# Patient Record
Sex: MALE | Race: WHITE | Employment: OTHER | URBAN - METROPOLITAN AREA
[De-identification: names, ages, dates, MRNs, and addresses within clinical notes are randomized per-mention and may not be internally consistent; named-entity substitution may affect disease eponyms.]

---

## 2017-01-09 DIAGNOSIS — J30.2 SEASONAL ALLERGIC RHINITIS, UNSPECIFIED ALLERGIC RHINITIS TRIGGER: Primary | ICD-10-CM

## 2017-01-09 RX ORDER — CETIRIZINE HYDROCHLORIDE 10 MG/1
10 TABLET ORAL DAILY PRN
Qty: 90 TABLET | Refills: 0 | Status: SHIPPED | OUTPATIENT
Start: 2017-01-09 | End: 2017-04-10

## 2017-01-09 NOTE — TELEPHONE ENCOUNTER
cetirizine 10mg      Last Written Prescription Date: 10/06/16  Last Fill Quantity: 90,  # refills: 0   Last Office Visit with [facility], [facility] or [facility] prescribing provider: 07/02/15

## 2017-01-09 NOTE — TELEPHONE ENCOUNTER
Routing refill request to provider for review/approval because:  Jina given x1 and patient did not follow up, please advise  Patient needs to be seen because it has been more than 1 year since last office visit. Pt last saw  7/2/15B. Tigre COHEN

## 2017-02-14 ENCOUNTER — OFFICE VISIT (OUTPATIENT)
Dept: INTERNAL MEDICINE | Facility: CLINIC | Age: 27
End: 2017-02-14
Payer: MEDICAID

## 2017-02-14 ENCOUNTER — MEDICAL CORRESPONDENCE (OUTPATIENT)
Dept: HEALTH INFORMATION MANAGEMENT | Facility: CLINIC | Age: 27
End: 2017-02-14

## 2017-02-14 VITALS
SYSTOLIC BLOOD PRESSURE: 132 MMHG | TEMPERATURE: 97.9 F | OXYGEN SATURATION: 98 % | DIASTOLIC BLOOD PRESSURE: 90 MMHG | HEIGHT: 76 IN | HEART RATE: 90 BPM | BODY MASS INDEX: 29.63 KG/M2 | WEIGHT: 243.3 LBS

## 2017-02-14 DIAGNOSIS — E03.9 HYPOTHYROIDISM, UNSPECIFIED: ICD-10-CM

## 2017-02-14 DIAGNOSIS — E78.5 HYPERLIPIDEMIA LDL GOAL <160: ICD-10-CM

## 2017-02-14 DIAGNOSIS — Z00.00 ENCOUNTER FOR ROUTINE ADULT HEALTH EXAMINATION WITHOUT ABNORMAL FINDINGS: Primary | ICD-10-CM

## 2017-02-14 DIAGNOSIS — Z79.899 ENCOUNTER FOR LONG-TERM (CURRENT) USE OF HIGH-RISK MEDICATION: ICD-10-CM

## 2017-02-14 DIAGNOSIS — Z13.29 SCREENING FOR THYROID DISORDER: ICD-10-CM

## 2017-02-14 DIAGNOSIS — Z13.1 SCREENING FOR DIABETES MELLITUS: ICD-10-CM

## 2017-02-14 DIAGNOSIS — Z23 NEED FOR PROPHYLACTIC VACCINATION AND INOCULATION AGAINST INFLUENZA: ICD-10-CM

## 2017-02-14 LAB
ALBUMIN SERPL-MCNC: 4.2 G/DL (ref 3.4–5)
ALP SERPL-CCNC: 115 U/L (ref 40–150)
ALT SERPL W P-5'-P-CCNC: 65 U/L (ref 0–70)
ANION GAP SERPL CALCULATED.3IONS-SCNC: 6 MMOL/L (ref 3–14)
AST SERPL W P-5'-P-CCNC: 23 U/L (ref 0–45)
BASOPHILS # BLD AUTO: 0 10E9/L (ref 0–0.2)
BASOPHILS NFR BLD AUTO: 0.4 %
BILIRUB SERPL-MCNC: 0.4 MG/DL (ref 0.2–1.3)
BUN SERPL-MCNC: 11 MG/DL (ref 7–30)
CALCIUM SERPL-MCNC: 9.1 MG/DL (ref 8.5–10.1)
CHLORIDE SERPL-SCNC: 102 MMOL/L (ref 94–109)
CHOLEST SERPL-MCNC: 191 MG/DL
CO2 SERPL-SCNC: 30 MMOL/L (ref 20–32)
CREAT SERPL-MCNC: 1.07 MG/DL (ref 0.66–1.25)
DIFFERENTIAL METHOD BLD: NORMAL
EOSINOPHIL # BLD AUTO: 0.2 10E9/L (ref 0–0.7)
EOSINOPHIL NFR BLD AUTO: 2.1 %
ERYTHROCYTE [DISTWIDTH] IN BLOOD BY AUTOMATED COUNT: 12.6 % (ref 10–15)
GFR SERPL CREATININE-BSD FRML MDRD: 82 ML/MIN/1.7M2
GLUCOSE SERPL-MCNC: 98 MG/DL (ref 70–99)
HBA1C MFR BLD: 5.2 % (ref 4.3–6)
HCT VFR BLD AUTO: 43.8 % (ref 40–53)
HDLC SERPL-MCNC: 30 MG/DL
HGB BLD-MCNC: 15.6 G/DL (ref 13.3–17.7)
LDLC SERPL CALC-MCNC: ABNORMAL MG/DL
LDLC SERPL DIRECT ASSAY-MCNC: 69 MG/DL
LYMPHOCYTES # BLD AUTO: 2.9 10E9/L (ref 0.8–5.3)
LYMPHOCYTES NFR BLD AUTO: 38 %
MCH RBC QN AUTO: 29.5 PG (ref 26.5–33)
MCHC RBC AUTO-ENTMCNC: 35.6 G/DL (ref 31.5–36.5)
MCV RBC AUTO: 83 FL (ref 78–100)
MONOCYTES # BLD AUTO: 0.5 10E9/L (ref 0–1.3)
MONOCYTES NFR BLD AUTO: 5.9 %
NEUTROPHILS # BLD AUTO: 4.1 10E9/L (ref 1.6–8.3)
NEUTROPHILS NFR BLD AUTO: 53.6 %
NONHDLC SERPL-MCNC: 161 MG/DL
PLATELET # BLD AUTO: 249 10E9/L (ref 150–450)
POTASSIUM SERPL-SCNC: 4 MMOL/L (ref 3.4–5.3)
PROT SERPL-MCNC: 8.3 G/DL (ref 6.8–8.8)
RBC # BLD AUTO: 5.28 10E12/L (ref 4.4–5.9)
SODIUM SERPL-SCNC: 138 MMOL/L (ref 133–144)
T4 FREE SERPL-MCNC: 0.59 NG/DL (ref 0.76–1.46)
TRIGL SERPL-MCNC: 613 MG/DL
TSH SERPL DL<=0.005 MIU/L-ACNC: 7.47 MU/L (ref 0.4–4)
WBC # BLD AUTO: 7.7 10E9/L (ref 4–11)

## 2017-02-14 PROCEDURE — 93000 ELECTROCARDIOGRAM COMPLETE: CPT | Performed by: INTERNAL MEDICINE

## 2017-02-14 PROCEDURE — 84439 ASSAY OF FREE THYROXINE: CPT | Performed by: INTERNAL MEDICINE

## 2017-02-14 PROCEDURE — 84443 ASSAY THYROID STIM HORMONE: CPT | Performed by: INTERNAL MEDICINE

## 2017-02-14 PROCEDURE — 80061 LIPID PANEL: CPT | Performed by: INTERNAL MEDICINE

## 2017-02-14 PROCEDURE — 36415 COLL VENOUS BLD VENIPUNCTURE: CPT | Performed by: INTERNAL MEDICINE

## 2017-02-14 PROCEDURE — 99395 PREV VISIT EST AGE 18-39: CPT | Mod: 25 | Performed by: INTERNAL MEDICINE

## 2017-02-14 PROCEDURE — 90688 IIV4 VACCINE SPLT 0.5 ML IM: CPT | Performed by: INTERNAL MEDICINE

## 2017-02-14 PROCEDURE — 80053 COMPREHEN METABOLIC PANEL: CPT | Performed by: INTERNAL MEDICINE

## 2017-02-14 PROCEDURE — 83036 HEMOGLOBIN GLYCOSYLATED A1C: CPT | Performed by: INTERNAL MEDICINE

## 2017-02-14 PROCEDURE — 99213 OFFICE O/P EST LOW 20 MIN: CPT | Mod: 25 | Performed by: INTERNAL MEDICINE

## 2017-02-14 PROCEDURE — 90471 IMMUNIZATION ADMIN: CPT | Performed by: INTERNAL MEDICINE

## 2017-02-14 PROCEDURE — 85025 COMPLETE CBC W/AUTO DIFF WBC: CPT | Performed by: INTERNAL MEDICINE

## 2017-02-14 PROCEDURE — 83721 ASSAY OF BLOOD LIPOPROTEIN: CPT | Mod: 59 | Performed by: INTERNAL MEDICINE

## 2017-02-14 NOTE — MR AVS SNAPSHOT
"              After Visit Summary   2/14/2017    Ravi Dunne    MRN: [id number]           Patient Information     Date Of Birth          1990        Visit Information        Provider Department      2/14/2017 9:00 AM Bhaskar Gandara MD [facility]        Today's Diagnoses     Encounter for routine adult health examination without abnormal findings    -  1    Encounter for long-term (current) use of high-risk medication        Hyperlipidemia LDL goal <160        Screening for diabetes mellitus        Need for prophylactic vaccination and inoculation against influenza        Screening for thyroid disorder          Care Instructions      Preventive Health Recommendations  Male Ages 26 - 39    Yearly exam:             See your health care provider every year in order to  o   Review health changes.   o   Discuss preventive care.    o   Review your medicines if your doctor has prescribed any.    You should be tested each year for STDs (sexually transmitted diseases), if you re at risk.     After age 35, talk to your provider about cholesterol testing. If you are at risk for heart disease, have your cholesterol tested at least every 5 years.     If you are at risk for diabetes, you should have a diabetes test (fasting glucose).  Shots: Get a flu shot each year. Get a tetanus shot every 10 years.     Nutrition:    Eat at least 5 servings of fruits and vegetables daily.     Eat whole-grain bread, whole-wheat pasta and brown rice instead of white grains and rice.     Talk to your provider about Calcium and Vitamin D.        --Good Grains:  Oats, brown rice, Quinoa (these do not raise the blood sugar as much)     --Bad grains:  Anything made from wheat or white rice     (because these raise the blood sugars significantly, and the possible gluten issue from wheat for some people).      --Proteins:  Aim for \"lean proteins\" including chicken, fish, seafood, pork, turkey, and eggs (in " "moderation); Eat red meat only occasionally                Lifestyle    Exercise for at least 150 minutes a week (30 minutes a day, 5 days a week). This will help you control your weight and prevent disease.     Limit alcohol to one drink per day.     No smoking.     Wear sunscreen to prevent skin cancer.     See your dentist every six months for an exam and cleaning.           Follow-ups after your visit        Who to contact     If you have questions or need follow up information about today's clinic visit or your schedule please contact [facility] directly at [phone].  Normal or non-critical lab and imaging results will be communicated to you by [employer], letter or phone within 4 business days after the clinic has received the results. If you do not hear from us within 7 days, please contact the clinic through Talkwheelt or phone. If you have a critical or abnormal lab result, we will notify you by phone as soon as possible.  Submit refill requests through SidelineSwap or call your pharmacy and they will forward the refill request to us. Please allow 3 business days for your refill to be completed.          Additional Information About Your Visit        MyChart Information     SidelineSwap lets you send messages to your doctor, view your test results, renew your prescriptions, schedule appointments and more. To sign up, go to [email or web address] . Click on \"Log in\" on the left side of the screen, which will take you to the Welcome page. Then click on \"Sign up Now\" on the right side of the page.     You will be asked to enter the access code listed below, as well as some personal information. Please follow the directions to create your username and password.     Your access code is: [email or web address]  Expires: 5/15/2017  9:52 AM     Your access code will  in 90 days. If you need help or a new code, please call your [facility] or [phone].        Care EveryWhere ID     This is your " "Care EveryWhere ID. This could be used by other organizations to access your [place] medical records  [id number]        Your Vitals Were     Pulse Temperature Height Pulse Oximetry BMI (Body Mass Index)       90 97.9  F (36.6  C) (Oral) 6' 4\" (1.93 m) 98% 29.62 kg/m2        Blood Pressure from Last 3 Encounters:   02/14/17 132/90   07/10/16 142/78   05/17/16 146/77    Weight from Last 3 Encounters:   02/14/17 243 lb 4.8 oz (110.4 kg)   05/17/16 210 lb (95.3 kg)   02/08/16 223 lb (101.2 kg)              We Performed the Following     CBC with platelets and differential     Comprehensive metabolic panel (BMP + Alb, Alk Phos, ALT, AST, Total. Bili, TP)     EKG 12-lead complete w/read - Clinics     FLU VACCINE, 3 YRS +, IM (QUADRIVALENT W/PRESERVATIVES/MULTI-DOSE) [[id number]]     Hemoglobin A1c     Lipid Profile with reflex to direct LDL     TSH with free T4 reflex     Vaccine Administration, Initial [[id number]]        Primary Care Provider Office Phone # Fax #    Bhaskar Gandara [phone]       [facility] [address] [facility] [address]        Thank you!     Thank you for choosing [facility]  for your care. Our goal is always to provide you with excellent care. Hearing back from our patients is one way we can continue to improve our services. Please take a few minutes to complete the written survey that you may receive in the mail after your visit with us. Thank you!             Your Updated Medication List - Protect others around you: Learn how to safely use, store and throw away your medicines at www.disposemymeds.org.          This list is accurate as of: 2/14/17  9:57 AM.  Always use your most recent med list.                   Brand Name Dispense Instructions for use    cetirizine 10 MG tablet    zyrTEC    90 tablet    Take 1 tablet (10 mg) by mouth daily as needed for allergies.       escitalopram 20 MG tablet    LEXAPRO     Take 1 tablet by mouth At " Bedtime.       omeprazole 20 MG CR capsule    priLOSEC    90 capsule    Take 1 capsule (20 mg) by mouth daily       ondansetron 4 MG tablet    ZOFRAN    6 tablet    Take 1 tablet (4 mg) by mouth every 8 hours as needed for nausea       * OXcarbazepine 300 MG tablet    TRILEPTAL     Take 900 mg by mouth At Bedtime       * OXcarbazepine 300 MG tablet    TRILEPTAL     Take 600 mg by mouth daily       oxyCODONE 5 MG IR tablet    ROXICODONE    15 tablet    Take 1 tablet (5 mg) by mouth every 6 hours as needed for moderate to severe pain       prochlorperazine 10 MG tablet    COMPAZINE    20 tablet    Take 1 tablet (10 mg) by mouth every 6 hours as needed for nausea or vomiting       * SEROQUEL 50 MG tablet   Generic drug:  QUEtiapine      Take 50 mg by mouth 2 times daily 6pm and HS       * SEROQUEL 25 MG tablet   Generic drug:  QUEtiapine      Take 25 mg by mouth daily At 3pm       * Notice:  This list has 4 medication(s) that are the same as other medications prescribed for you. Read the directions carefully, and ask your doctor or other care provider to review them with you.

## 2017-02-14 NOTE — NURSING NOTE
"Chief Complaint   Patient presents with     Physical     pt is fasting       Initial /90  Pulse 90  Temp 97.9  F (36.6  C) (Oral)  Ht 6' 4\" (1.93 m)  Wt 243 lb 4.8 oz (110.4 kg)  SpO2 98%  BMI 29.62 kg/m2 Estimated body mass index is 29.62 kg/(m^2) as calculated from the following:    Height as of this encounter: 6' 4\" (1.93 m).    Weight as of this encounter: 243 lb 4.8 oz (110.4 kg).  Medication Reconciliation: complete   Kayla Lima CMA      "

## 2017-02-14 NOTE — PATIENT INSTRUCTIONS
"  Preventive Health Recommendations  Male Ages 26 - 39    Yearly exam:             See your health care provider every year in order to  o   Review health changes.   o   Discuss preventive care.    o   Review your medicines if your doctor has prescribed any.    You should be tested each year for STDs (sexually transmitted diseases), if you re at risk.     After age 35, talk to your provider about cholesterol testing. If you are at risk for heart disease, have your cholesterol tested at least every 5 years.     If you are at risk for diabetes, you should have a diabetes test (fasting glucose).  Shots: Get a flu shot each year. Get a tetanus shot every 10 years.     Nutrition:    Eat at least 5 servings of fruits and vegetables daily.     Eat whole-grain bread, whole-wheat pasta and brown rice instead of white grains and rice.     Talk to your provider about Calcium and Vitamin D.        --Good Grains:  Oats, brown rice, Quinoa (these do not raise the blood sugar as much)     --Bad grains:  Anything made from wheat or white rice     (because these raise the blood sugars significantly, and the possible gluten issue from wheat for some people).      --Proteins:  Aim for \"lean proteins\" including chicken, fish, seafood, pork, turkey, and eggs (in moderation); Eat red meat only occasionally                Lifestyle    Exercise for at least 150 minutes a week (30 minutes a day, 5 days a week). This will help you control your weight and prevent disease.     Limit alcohol to one drink per day.     No smoking.     Wear sunscreen to prevent skin cancer.     See your dentist every six months for an exam and cleaning.     "

## 2017-02-14 NOTE — PROGRESS NOTES
SUBJECTIVE:     CC: Ravi Dunne is an 26 year old male who presents for preventative health visit.     Healthy Habits:    Do you get at least three servings of calcium containing foods daily (dairy, green leafy vegetables, etc.)? yes    Amount of exercise or daily activities, outside of work: 7 day(s) per week    Problems taking medications regularly No    Medication side effects: No    Have you had an eye exam in the past two years? unknown    Do you see a dentist twice per year? no    Do you have sleep apnea, excessive snoring or daytime drowsiness?no    EKG needed and some labs per   Pt also has episodes of vomiting        Today's PHQ-2 Score:   PHQ-2 ( 1999 Pfizer) 2/14/2017 7/2/2015   Q1: Little interest or pleasure in doing things 0 0   Q2: Feeling down, depressed or hopeless 0 0   PHQ-2 Score 0 0       Abuse: Current or Past(Physical, Sexual or Emotional)- No  Do you feel safe in your environment - Yes    Social History   Substance Use Topics     Smoking status: Never Smoker     Smokeless tobacco: Never Used     Alcohol use No     The patient does not drink >3 drinks per day nor >7 drinks per week.    Last PSA: No results found for: PSA    Recent Labs   Lab Test  06/24/14   0913  03/16/12   0959   CHOL  149  180   HDL  42  40   LDL  76  95   TRIG  155*  224*   CHOLHDLRATIO  3.5  4.5       Reviewed orders with patient. Reviewed health maintenance and updated orders accordingly - Yes    All Histories reviewed and updated in Epic.    Past Medical History:  ---------------------------  Past Medical History   Diagnosis Date     Anxiety      Autism      Autoimmune hypothyroidism 02/14/2017     positive anti-thyroid antibodies     Cyclic vomiting syndrome      Development delay      Hyperlipidemia 2008     elevated triglycerides 209-271       Past Surgical History:  ---------------------------  Past Surgical History   Procedure Laterality Date     No history of surgery         Current  Medications:  ---------------------------  Current Outpatient Prescriptions   Medication Sig Dispense Refill     levothyroxine (SYNTHROID/LEVOTHROID) 100 MCG tablet Take 1 tablet (100 mcg) by mouth daily 90 tablet 0     cetirizine (ZYRTEC) 10 MG tablet Take 1 tablet (10 mg) by mouth daily as needed for allergies. 90 tablet 0     oxyCODONE (ROXICODONE) 5 MG immediate release tablet Take 1 tablet (5 mg) by mouth every 6 hours as needed for moderate to severe pain 15 tablet 0     omeprazole (PRILOSEC) 20 MG capsule Take 1 capsule (20 mg) by mouth daily 90 capsule 3     OXcarbazepine (TRILEPTAL) 300 MG tablet Take 900 mg by mouth At Bedtime       prochlorperazine (COMPAZINE) 10 MG tablet Take 1 tablet (10 mg) by mouth every 6 hours as needed for nausea or vomiting 20 tablet 1     escitalopram (LEXAPRO) 20 MG tablet Take 1 tablet by mouth At Bedtime.       QUEtiapine (SEROQUEL) 50 MG tablet Take 50 mg by mouth 2 times daily 6pm and HS       QUEtiapine (SEROQUEL) 25 MG tablet Take 25 mg by mouth daily At 3pm       oxcarbazepine (TRILEPTAL) 300 MG tablet Take 600 mg by mouth daily        ondansetron (ZOFRAN) 4 MG tablet Take 1 tablet (4 mg) by mouth every 8 hours as needed for nausea (Patient not taking: Reported on 2/14/2017) 6 tablet 0       Allergies:  -------------  Allergies   Allergen Reactions     No Clinical Screening - See Krista mckee     No Known Drug Allergies        Social History:  -------------------  Social History     Social History     Marital status: Single     Spouse name: N/A     Number of children: N/A     Years of education: N/A     Occupational History     Not on file.     Social History Main Topics     Smoking status: Never Smoker     Smokeless tobacco: Never Used     Alcohol use No     Drug use: No     Sexual activity: No     Other Topics Concern     Not on file     Social History Narrative       Family Medical History:  ------------------------------  Family History   Problem Relation  "Age of Onset     Alcoholism Father      CANCER Father 50     throat cancer from smoking; in remission with chemo and surgery     Autism Spectrum Disorder Brother         ROS:  C: NEGATIVE for fever, chills, change in weight  I: NEGATIVE for worrisome rashes, moles or lesions  E: NEGATIVE for vision changes or irritation  ENT: NEGATIVE for ear, mouth and throat problems  R: NEGATIVE for significant cough or SOB  CV: NEGATIVE for chest pain, palpitations or peripheral edema  GI: NEGATIVE for nausea, abdominal pain, heartburn, or change in bowel habits   male: negative for dysuria, hematuria, decreased urinary stream, erectile dysfunction, urethral discharge  M: NEGATIVE for significant arthralgias or myalgia  N: NEGATIVE for weakness, dizziness or paresthesias  P: NEGATIVE for changes in mood or affect      OBJECTIVE:     /90  Pulse 90  Temp 97.9  F (36.6  C) (Oral)  Ht 6' 4\" (1.93 m)  Wt 243 lb 4.8 oz (110.4 kg)  SpO2 98%  BMI 29.62 kg/m2  EXAM:  GENERAL alert and no distress.  EYES conjunctivae/corneas clear. PERRL, EOM's intact  HENT: NCAT,oral and posterior pharynx without lesions or erythema, facies symmetric  NECK: Neck supple. No LAD, without thyroidmegaly or JVD.  RESP: Clear to ausculation bilaterally without wheezes or crackles. Normal BS in all fields.  CV: RRR normal S1S2 without  murmurs, rubs or gallops. PMI normal  LYMPH: no cervical lymph adenopathy appreciated  GI: NTND, no organomegaly, normal BS in all quadrants, without rebound or guarding  MS: No cyanosis, clubbing or edema noted bilaterally in Upper and/or Lower Extremities  SKIN: no significant ulcers, lesions or rashes on the visualized portions of the skin  NEURO: Alert and Oriented x 3, Gait normal. Reflexes normal and symmetric. Sensation grossly WNL..  PSYCH: Alert and oriented times 3; speech- coherent , normal rate and volume; able to articulate logical thoughts, able to abstract reason, no tangential thoughts, no hallucinations " or delusions, affect- normal     ASSESSMENT/PLAN:       (Z00.00) Encounter for routine adult health examination without abnormal findings  (primary encounter diagnosis)  Comment: Discussed cardiac disease risk factor modification including screening for and treating HTN, lipids, DM, and smoking cessation.  Also discussed age appropriate cancer screening recommendations including testicular, prostate, colon and lung cancer as dictated by age group.  Recommended low fat, low salt diet and moderation in any alcohol intake.  Recommended always using seatbelts when in a car.  Recommended never driving after drinking or riding with someone who has been drinking as well.       Plan: LDL cholesterol direct, T4 free            (Z79.899) Encounter for long-term (current) use of high-risk medication  Comment: lab check as requeste anastasia hispsychiatrist.   Plan: EKG 12-lead complete w/read - Clinics, CBC with        platelets and differential, Comprehensive         metabolic panel (BMP + Alb, Alk Phos, ALT, AST,        Total. Bili, TP), Hemoglobin A1c, Lipid Profile        with reflex to direct LDL            (E78.5) Hyperlipidemia LDL goal <160  Comment: Discussed current lipid results, previous results (if available) current guidelines (NCEP) for treatment and goals for lipids.  Discussed lifestyle modification, dietary changes (low fat, low simple carb) and regular aerobic exercise.  Discussed the link between dysmetabolic syndrome and impaired glucose tolerance seen in certain patterns of lipids.  Briefly discussed medication used for lipid lowering, including the statins are their possible side effects of myalgias, rhabdomyolysis, and liver toxicity.   Plan: Comprehensive metabolic panel (BMP + Alb, Alk         Phos, ALT, AST, Total. Bili, TP), Lipid Profile        with reflex to direct LDL            (Z13.1) Screening for diabetes mellitus  Comment:   Plan: Comprehensive metabolic panel (BMP + Alb, Alk         Phos, ALT,  "AST, Total. Bili, TP), Hemoglobin         A1c            (Z23) Need for prophylactic vaccination and inoculation against influenza  Comment:   Plan: FLU VACCINE, 3 YRS +, IM (QUADRIVALENT         W/PRESERVATIVES/MULTI-DOSE) [65521], Vaccine         Administration, Initial [35574]            (Z13.29) Screening for thyroid disorder  Comment:   Plan: TSH with free T4 reflex            (E03.9) Hypothyroidism, unspecified  Comment: TSH elevated, FT4 low, chcek anti thyroid antibodies.   Start thyroid replacement.   Plan: Thyroid peroxidase antibody, Thyroglobulin and         antibody, Anti thyroglobulin antibody, TSH, T4,        free, levothyroxine (SYNTHROID/LEVOTHROID) 100         MCG tablet               COUNSELING:  Reviewed preventive health counseling, as reflected in patient instructions       Regular exercise       Healthy diet/nutrition       Vision screening       Hearing screening         reports that he has never smoked. He has never used smokeless tobacco.    Estimated body mass index is 29.62 kg/(m^2) as calculated from the following:    Height as of this encounter: 6' 4\" (1.93 m).    Weight as of this encounter: 243 lb 4.8 oz (110.4 kg).       Counseling Resources:  ATP IV Guidelines  Pooled Cohorts Equation Calculator  FRAX Risk Assessment  ICSI Preventive Guidelines  Dietary Guidelines for Americans, 2010  USDA's MyPlate  ASA Prophylaxis  Lung CA Screening    Bhaskar Gandara MD  [facility]      Injectable Influenza Immunization Documentation    1.  Is the person to be vaccinated sick today?  Just getting over a cold    2. Does the person to be vaccinated have an allergy to eggs or to a component of the vaccine?  No    3. Has the person to be vaccinated today ever had a serious reaction to influenza vaccine in the past?  No    4. Has the person to be vaccinated ever had Guillain-[place] syndrome?  No     Form completed by Kayla Lima CMA      "

## 2017-02-15 PROBLEM — E03.9 HYPOTHYROIDISM, UNSPECIFIED: Status: ACTIVE | Noted: 2017-02-15

## 2017-02-15 RX ORDER — LEVOTHYROXINE SODIUM 100 UG/1
100 TABLET ORAL DAILY
Qty: 90 TABLET | Refills: 0 | Status: SHIPPED | OUTPATIENT
Start: 2017-02-15 | End: 2017-05-12

## 2017-02-15 NOTE — PROGRESS NOTES
Spoke with mother about labs.  Labs represent hypothyroidism, will start levothyroxine 100 mcg once daily.   triglycerides could be result of this as well.   Discussed lower carb diet, taking thyroid medication.    Return to see me in approximately approx. 2 months sooner if needed.  [facility] this appointment.  Call [phone] to schedule both appointments.

## 2017-02-20 DIAGNOSIS — E06.3 AUTOIMMUNE HYPOTHYROIDISM: ICD-10-CM

## 2017-02-20 DIAGNOSIS — E03.9 HYPOTHYROIDISM, UNSPECIFIED: ICD-10-CM

## 2017-02-20 PROCEDURE — 86800 THYROGLOBULIN ANTIBODY: CPT | Performed by: INTERNAL MEDICINE

## 2017-02-20 PROCEDURE — 84432 ASSAY OF THYROGLOBULIN: CPT | Mod: 90 | Performed by: INTERNAL MEDICINE

## 2017-02-20 PROCEDURE — 86800 THYROGLOBULIN ANTIBODY: CPT | Mod: 90 | Performed by: INTERNAL MEDICINE

## 2017-02-20 PROCEDURE — 36415 COLL VENOUS BLD VENIPUNCTURE: CPT | Performed by: INTERNAL MEDICINE

## 2017-02-20 PROCEDURE — 86376 MICROSOMAL ANTIBODY EACH: CPT | Performed by: INTERNAL MEDICINE

## 2017-02-20 PROCEDURE — 99000 SPECIMEN HANDLING OFFICE-LAB: CPT | Performed by: INTERNAL MEDICINE

## 2017-02-21 LAB
THYROGLOB AB SERPL IA-ACNC: 364 IU/ML (ref 0–40)
THYROPEROXIDASE AB SERPL-ACNC: 290 IU/ML

## 2017-02-23 PROBLEM — E06.3 AUTOIMMUNE HYPOTHYROIDISM: Status: ACTIVE | Noted: 2017-02-14

## 2017-03-03 LAB — LAB SCANNED RESULT: NORMAL

## 2017-04-10 DIAGNOSIS — J30.2 SEASONAL ALLERGIC RHINITIS, UNSPECIFIED ALLERGIC RHINITIS TRIGGER: ICD-10-CM

## 2017-04-11 RX ORDER — CETIRIZINE HYDROCHLORIDE 10 MG/1
TABLET ORAL
Qty: 90 TABLET | Refills: 2 | Status: SHIPPED | OUTPATIENT
Start: 2017-04-11 | End: 2018-01-22

## 2017-05-12 DIAGNOSIS — E03.9 HYPOTHYROIDISM, UNSPECIFIED: ICD-10-CM

## 2017-05-12 RX ORDER — LEVOTHYROXINE SODIUM 100 UG/1
TABLET ORAL
Qty: 30 TABLET | Refills: 0 | Status: SHIPPED | OUTPATIENT
Start: 2017-05-12 | End: 2017-06-11

## 2017-05-12 NOTE — TELEPHONE ENCOUNTER
levothyroxine     Last Written Prescription Date: 2/15/17  Last Quantity: 90, # refills: 0  Last Office Visit with [facility] or [facility] prescribing provider: 2/14/17        TSH   Date Value Ref Range Status   02/14/2017 7.47 (H) 0.40 - 4.00 mU/L Final

## 2017-05-12 NOTE — TELEPHONE ENCOUNTER
"Pleaki call the patients mother (she is his main caregiver).    1 month prescription sent electronically to the specified pharmacy.    He needs to have follow up thyroid labs performed to confirm that this is a correct dose for him.   Return for a \"lab only appointment\" in the next couple of weeks or before the next refill to recheck labs (nonfasting).  I will make contact either via phone or Plusmohart regarding the results and any recommendations once I have reviewed them.   "

## 2017-05-22 DIAGNOSIS — R11.15 CYCLICAL VOMITING WITH NAUSEA: ICD-10-CM

## 2017-06-11 DIAGNOSIS — E03.9 HYPOTHYROIDISM, UNSPECIFIED: ICD-10-CM

## 2017-06-12 NOTE — TELEPHONE ENCOUNTER
levothyroxine     Last Written Prescription Date: 05/1217  Last Quantity: 30, # refills: 0  Last Office Visit with [facility] or [facility] prescribing provider: 02/14/17        TSH   Date Value Ref Range Status   02/14/2017 7.47 (H) 0.40 - 4.00 mU/L Final

## 2017-06-13 RX ORDER — LEVOTHYROXINE SODIUM 100 UG/1
TABLET ORAL
Qty: 30 TABLET | Refills: 0 | Status: SHIPPED | OUTPATIENT
Start: 2017-06-13 | End: 2017-08-11 | Stop reason: DRUGHIGH

## 2017-06-13 NOTE — TELEPHONE ENCOUNTER
"1 month prescription sent electronically to the specified pharmacy.    Please call the mother,  He is due for a follow up lab to recheck the thyroid level to make sure that this is the correct dose for the patient.   Return for a \"lab only appointment\" in the next couple of week to recheck labs (nonfasting).  I will make contact either via phone or SwapBeatshart regarding the results and any recommendations once I have reviewed them.   "

## 2017-08-11 ENCOUNTER — TELEPHONE (OUTPATIENT)
Dept: INTERNAL MEDICINE | Facility: CLINIC | Age: 27
End: 2017-08-11

## 2017-08-11 DIAGNOSIS — E03.9 HYPOTHYROIDISM, UNSPECIFIED: ICD-10-CM

## 2017-08-11 DIAGNOSIS — E78.5 HYPERLIPIDEMIA LDL GOAL <160: Primary | ICD-10-CM

## 2017-08-11 LAB
T4 FREE SERPL-MCNC: 0.5 NG/DL (ref 0.76–1.46)
TSH SERPL DL<=0.005 MIU/L-ACNC: 8.48 MU/L (ref 0.4–4)

## 2017-08-11 PROCEDURE — 84439 ASSAY OF FREE THYROXINE: CPT | Performed by: INTERNAL MEDICINE

## 2017-08-11 PROCEDURE — 36415 COLL VENOUS BLD VENIPUNCTURE: CPT | Performed by: INTERNAL MEDICINE

## 2017-08-11 PROCEDURE — 84443 ASSAY THYROID STIM HORMONE: CPT | Performed by: INTERNAL MEDICINE

## 2017-08-11 RX ORDER — LEVOTHYROXINE SODIUM 125 UG/1
125 TABLET ORAL DAILY
Qty: 90 TABLET | Refills: 0 | Status: SHIPPED | OUTPATIENT
Start: 2017-08-11 | End: 2017-11-09

## 2017-08-11 NOTE — TELEPHONE ENCOUNTER
"Called mother and spoke with her about his results (Se is guardian, patient severely autistic).   The patient has not been taking his thyroid medicaito for over one month as they ran out.     Sent in new RX for levothyroxine at 125 mcg daily dose.  Return for a \"lab only appointment\" in approximately 6-8 weeks.    Please get these labs done in a fasting state with nothing to eat for at least 8 hours prior to getting labs drawn.  I will make contact regarding the results and any recommendations once I have reviewed them.     We will titrate the dose of thyroid medication based on thses labs.     Mom is OK with this plan  "

## 2017-11-09 DIAGNOSIS — G43.A0 CYCLICAL VOMITING WITH NAUSEA, INTRACTABILITY OF VOMITING NOT SPECIFIED: ICD-10-CM

## 2017-11-09 DIAGNOSIS — E06.3 AUTOIMMUNE HYPOTHYROIDISM: Primary | ICD-10-CM

## 2017-11-09 DIAGNOSIS — E78.5 HYPERLIPIDEMIA LDL GOAL <160: ICD-10-CM

## 2017-11-09 NOTE — TELEPHONE ENCOUNTER
Pt notified and mother notified . Scheduled . Jagruti Link RN, md please refuse med till new tsh back .Jagruti Link RN

## 2017-11-09 NOTE — TELEPHONE ENCOUNTER
"Please call the patient (via his mother due to patient's decreased intellectual capacity).   He needs to come in to the lab to recheck the thyroid level so I cn prescribe the correct dose.   I was also hoping to recheck the fasting lipids as well since they were elevated last time.   Return for a \"lab only appointment\" as soon as possible to recheck labs (fasting).  I will make contact either via phone or [employer] regarding the results and any recommendations once I have reviewed them.   Then I can send the thyroid refill.     If they cannot come in before they run out of pills, then I can just send one month, but I would hate to have them  a RX and then cahnge it in a few days.      Let me know.     "

## 2017-11-15 DIAGNOSIS — E78.5 HYPERLIPIDEMIA LDL GOAL <160: ICD-10-CM

## 2017-11-15 DIAGNOSIS — E03.9 HYPOTHYROIDISM: ICD-10-CM

## 2017-11-15 LAB
CHOLEST SERPL-MCNC: 190 MG/DL
HDLC SERPL-MCNC: 38 MG/DL
LDLC SERPL CALC-MCNC: 110 MG/DL
NONHDLC SERPL-MCNC: 152 MG/DL
TRIGL SERPL-MCNC: 209 MG/DL
TSH SERPL DL<=0.005 MIU/L-ACNC: 1.98 MU/L (ref 0.4–4)

## 2017-11-15 PROCEDURE — 84443 ASSAY THYROID STIM HORMONE: CPT | Performed by: INTERNAL MEDICINE

## 2017-11-15 PROCEDURE — 80061 LIPID PANEL: CPT | Performed by: INTERNAL MEDICINE

## 2017-11-15 PROCEDURE — 36415 COLL VENOUS BLD VENIPUNCTURE: CPT | Performed by: INTERNAL MEDICINE

## 2017-11-15 RX ORDER — LEVOTHYROXINE SODIUM 125 UG/1
125 TABLET ORAL DAILY
Qty: 90 TABLET | Refills: 3 | Status: SHIPPED | OUTPATIENT
Start: 2017-11-15 | End: 2018-12-05

## 2017-11-15 NOTE — TELEPHONE ENCOUNTER
Called mom and gave results and information below.     Thyroid labs normal at this dose    Prescription(s) sent electronically to specified pharmacy.     Return to see me in approximately 3-6 months for routine exam, sooner if needed.  [facility] before this appointment.  Call [phone] to schedule both appointments.

## 2017-12-12 ENCOUNTER — HOSPITAL ENCOUNTER (EMERGENCY)
Facility: CLINIC | Age: 27
Discharge: HOME | End: 2017-12-12
Attending: EMERGENCY MEDICINE | Admitting: EMERGENCY MEDICINE
Payer: MEDICAID

## 2017-12-12 VITALS
SYSTOLIC BLOOD PRESSURE: 144 MMHG | HEIGHT: 76 IN | BODY MASS INDEX: 26.79 KG/M2 | RESPIRATION RATE: 20 BRPM | TEMPERATURE: 95.1 F | HEART RATE: 88 BPM | DIASTOLIC BLOOD PRESSURE: 94 MMHG | WEIGHT: 220 LBS | OXYGEN SATURATION: 100 %

## 2017-12-12 DIAGNOSIS — K52.9 GASTROENTERITIS: ICD-10-CM

## 2017-12-12 PROCEDURE — 96361 HYDRATE IV INFUSION ADD-ON: CPT

## 2017-12-12 PROCEDURE — 99284 EMERGENCY DEPT VISIT MOD MDM: CPT | Mod: 25

## 2017-12-12 PROCEDURE — 96374 THER/PROPH/DIAG INJ IV PUSH: CPT

## 2017-12-12 PROCEDURE — 96375 TX/PRO/DX INJ NEW DRUG ADDON: CPT

## 2017-12-12 PROCEDURE — 96376 TX/PRO/DX INJ SAME DRUG ADON: CPT

## 2017-12-12 PROCEDURE — 25000128 H RX IP 250 OP 636: Performed by: EMERGENCY MEDICINE

## 2017-12-12 RX ORDER — METOCLOPRAMIDE 10 MG/1
10 TABLET ORAL EVERY 8 HOURS PRN
Qty: 10 TABLET | Refills: 0 | Status: SHIPPED | OUTPATIENT
Start: 2017-12-12 | End: 2019-04-03

## 2017-12-12 RX ORDER — ONDANSETRON 2 MG/ML
4 INJECTION INTRAMUSCULAR; INTRAVENOUS ONCE
Status: COMPLETED | OUTPATIENT
Start: 2017-12-12 | End: 2017-12-12

## 2017-12-12 RX ORDER — ONDANSETRON 4 MG/1
4 TABLET, ORALLY DISINTEGRATING ORAL EVERY 6 HOURS PRN
Qty: 10 TABLET | Refills: 0 | Status: SHIPPED | OUTPATIENT
Start: 2017-12-12 | End: 2017-12-15

## 2017-12-12 RX ORDER — METOCLOPRAMIDE HYDROCHLORIDE 5 MG/ML
10 INJECTION INTRAMUSCULAR; INTRAVENOUS ONCE
Status: COMPLETED | OUTPATIENT
Start: 2017-12-12 | End: 2017-12-12

## 2017-12-12 RX ADMIN — METOCLOPRAMIDE 10 MG: 5 INJECTION, SOLUTION INTRAMUSCULAR; INTRAVENOUS at 21:13

## 2017-12-12 RX ADMIN — SODIUM CHLORIDE, POTASSIUM CHLORIDE, SODIUM LACTATE AND CALCIUM CHLORIDE 1000 ML: 600; 310; 30; 20 INJECTION, SOLUTION INTRAVENOUS at 19:32

## 2017-12-12 RX ADMIN — ONDANSETRON 4 MG: 2 INJECTION INTRAMUSCULAR; INTRAVENOUS at 20:49

## 2017-12-12 RX ADMIN — ONDANSETRON 4 MG: 2 INJECTION INTRAMUSCULAR; INTRAVENOUS at 19:32

## 2017-12-12 ASSESSMENT — ENCOUNTER SYMPTOMS
ABDOMINAL PAIN: 1
NAUSEA: 1
VOMITING: 1

## 2017-12-12 NOTE — ED AVS SNAPSHOT
Emergency Department    [address] [place] [address]    Phone:  [phone]    Fax:  [phone]                                       Ravi Dunne   MRN: [id number]    Department:   Emergency Department   Date of Visit:  12/12/2017           After Visit Summary Signature Page     I have received my discharge instructions, and my questions have been answered. I have discussed any challenges I see with this plan with the nurse or doctor.    ..........................................................................................................................................  Patient/Patient Representative Signature      ..........................................................................................................................................  Patient Representative Print Name and Relationship to Patient    ..................................................               ................................................  Date                                            Time    ..........................................................................................................................................  Reviewed by Signature/Title    ...................................................              ..............................................  Date                                                            Time

## 2017-12-12 NOTE — ED AVS SNAPSHOT
"  Emergency Department    [address] [facility] [address]    Phone:  [phone]    Fax:  [phone]                                       Ravi Dunne   MRN: [id number]    Department:   Emergency Department   Date of Visit:  12/12/2017           Patient Information     Date Of Birth          1990        Your diagnoses for this visit were:     Gastroenteritis        You were seen by Elly Marie MD.      Follow-up Information     Follow up with Bhaskar Gandara MD.    Specialty:  Internal Medicine    Why:  As needed, If symptoms worsen    Contact information:    [address] [facility] [address]  [phone]          Follow up with  Emergency Department.    Specialty:  EMERGENCY MEDICINE    Why:  As needed, If symptoms worsen    Contact information:    [address] [facility] [address] [phone]        Discharge Instructions          * FOOD POISONING or VIRAL GASTROENTERITIS (6yr-Adult)  FOOD POISONING may occur from 6 to 24 hours after eating food that has spoiled and lasts up to1-2 days. VIRAL GASTRO-ENTERITIS is commonly known as the \"stomach flu\" and may last 2-7 days. Symptoms of both illnesses may include vomiting, diarrhea, fever, abdominal cramping. Antibiotics are not effective, but simple home treatment will be helpful.  HOME CARE:    If symptoms are severe, rest at home for the next 24 hours.    Avoid tobacco and alcohol. These may worsen your symptoms.    If medicines for diarrhea (low dose of Immodium: one tablet a day for an adult) or vomiting were prescribed, take only as directed.   During the first 12 to 24 hours follow the diet below:    DRINKS: Sport drinks like Gatorade, soft drinks without caffeine; ginger ale, mineral water (plain or flavored), decaffeinated tea and coffee.    SOUPS: Clear broth, consommé and bouillon    DESSERTS: Plain gelatin (Jell-O), popsicles and fruit juice bars.  During the next 24 hours " you may add the following to the above:    Hot cereal, plain toast, bread, rolls, crackers    Plain noodles, rice, mashed potatoes, chicken noodle or rice soup    Unsweetened canned fruit (avoid pineapple), bananas    Limit fat intake to less than 15 grams per day by avoiding margarine, butter, oils, mayonnaise, sauces, gravies, fried foods, peanut butter, meat, poultry and fish.    Limit fiber; avoid raw or cooked vegetables, fresh fruits (except bananas) and bran cereals.    Limit caffeine and chocolate. No spices or seasonings except salt.  Slowly go back to a normal diet as you feel better and your symptoms lessen.  FOLLOW UP with your doctor as advised if you are not better in 2 days. If a stool (diarrhea) sample was taken, you may call in 2 days (or as directed) for the results.  GET PROMPT MEDICAL ATTENTION if any of the following occur:    Increasing abdominal pain or constant pain in one spot    Continued vomiting (unable to keep liquids down)    Frequent diarrhea (more than 5 times a day)    Blood in vomit or stool (black or red color)    Unable to take in fluids at all    No urine output for 12 hours or extreme thirst    Weakness, dizziness, fainting    Drowsiness, confusion, stiff neck or seizure    Fever over 101.0  F (38.3  C) for more than 3 days    New rash    9960-2064 [employer]. [address] [place] [address]. All rights reserved. This information is not intended as a substitute for professional medical care. Always follow your healthcare professional's instructions.  This information has been modified by your health care provider with permission from the publisher.      24 Hour Appointment Hotline       To make an appointment at any [facility] call [phone] ([phone]). If you don't have a family doctor or clinic, we will help you find one. [facility] clinics are conveniently located to serve the needs of you and your family.             Review of your  medicines      START taking        Dose / Directions Last dose taken    metoclopramide 10 MG tablet   Commonly known as:  REGLAN   Dose:  10 mg   Quantity:  10 tablet        Take 1 tablet (10 mg) by mouth every 8 hours as needed (Nausea or Vomiting)   Refills:  0        ondansetron 4 MG ODT tab   Commonly known as:  ZOFRAN ODT   Dose:  4 mg   Quantity:  10 tablet        Take 1 tablet (4 mg) by mouth every 6 hours as needed for nausea   Refills:  0          Our records show that you are taking the medicines listed below. If these are incorrect, please call your family doctor or clinic.        Dose / Directions Last dose taken    cetirizine 10 MG tablet   Commonly known as:  zyrTEC   Quantity:  90 tablet        TAKE 1 TABLET(10 MG) BY MOUTH DAILY AS NEEDED FOR ALLERGIES   Refills:  2        escitalopram 20 MG tablet   Commonly known as:  LEXAPRO   Dose:  20 mg        Take 1 tablet by mouth At Bedtime.   Refills:  0        levothyroxine 125 MCG tablet   Commonly known as:  SYNTHROID/LEVOTHROID   Dose:  125 mcg   Quantity:  90 tablet        Take 1 tablet (125 mcg) by mouth daily   Refills:  3        omeprazole 20 MG CR capsule   Commonly known as:  priLOSEC   Quantity:  90 capsule        TAKE 1 CAPSULE(20 MG) BY MOUTH DAILY   Refills:  2        * OXcarbazepine 300 MG tablet   Commonly known as:  TRILEPTAL   Dose:  900 mg        Take 900 mg by mouth At Bedtime   Refills:  0        * OXcarbazepine 300 MG tablet   Commonly known as:  TRILEPTAL   Dose:  600 mg        Take 600 mg by mouth daily   Refills:  0        * SEROQUEL 50 MG tablet   Dose:  50 mg   Generic drug:  QUEtiapine        Take 50 mg by mouth 2 times daily 6pm and HS   Refills:  0        * SEROQUEL 25 MG tablet   Dose:  25 mg   Generic drug:  QUEtiapine        Take 25 mg by mouth daily At 3pm   Refills:  0        * Notice:  This list has 4 medication(s) that are the same as other medications prescribed for you. Read the directions carefully, and ask your  doctor or other care provider to review them with you.            Prescriptions were sent or printed at these locations (2 Prescriptions)                   Other Prescriptions                Printed at Department/Unit printer (2 of 2)         ondansetron (ZOFRAN ODT) 4 MG ODT tab               metoclopramide (REGLAN) 10 MG tablet                Orders Needing Specimen Collection     None      Pending Results     No orders found from 12/10/2017 to 12/13/2017.            Pending Culture Results     No orders found from 12/10/2017 to 12/13/2017.            Pending Results Instructions     If you had any lab results that were not finalized at the time of your Discharge, you can call the ED Lab Result RN at [phone]. You will be contacted by this team for any positive Lab results or changes in treatment. The nurses are available 7 days a week from 10A to 6:30P.  You can leave a message 24 hours per day and they will return your call.        Test Results From Your Hospital Stay               Clinical Quality Measure: Blood Pressure Screening     Your blood pressure was checked while you were in the emergency department today. The last reading we obtained was  BP: (!) 144/94 . Please read the guidelines below about what these numbers mean and what you should do about them.  If your systolic blood pressure (the top number) is less than 120 and your diastolic blood pressure (the bottom number) is less than 80, then your blood pressure is normal. There is nothing more that you need to do about it.  If your systolic blood pressure (the top number) is 120-139 or your diastolic blood pressure (the bottom number) is 80-89, your blood pressure may be higher than it should be. You should have your blood pressure rechecked within a year by a primary care provider.  If your systolic blood pressure (the top number) is 140 or greater or your diastolic blood pressure (the bottom number) is 90 or greater, you may have high blood  "pressure. High blood pressure is treatable, but if left untreated over time it can put you at risk for heart attack, stroke, or kidney failure. You should have your blood pressure rechecked by a primary care provider within the next 4 weeks.  If your provider in the emergency department today gave you specific instructions to follow-up with your doctor or provider even sooner than that, you should follow that instruction and not wait for up to 4 weeks for your follow-up visit.        Thank you for choosing [facility]       Thank you for choosing [facility] for your care. Our goal is always to provide you with excellent care. Hearing back from our patients is one way we can continue to improve our services. Please take a few minutes to complete the written survey that you may receive in the mail after you visit with us. Thank you!        [email or web address] Information     [facility] lets you send messages to your doctor, view your test results, renew your prescriptions, schedule appointments and more. To sign up, go to [email or web address] . Click on \"Log in\" on the left side of the screen, which will take you to the Welcome page. Then click on \"Sign up Now\" on the right side of the page.     You will be asked to enter the access code listed below, as well as some personal information. Please follow the directions to create your username and password.     Your access code is: [email or web address]  Expires: 3/12/2018 10:27 PM     Your access code will  in 90 days. If you need help or a new code, please call your [facility] clinic or [phone].        Care EveryWhere ID     This is your Care EveryWhere ID. This could be used by other organizations to access your [facility] medical records  [id number]        Equal Access to Services     IDA HYDE : karlie Gusman, jalil joyce. So [facility] [phone].    ATENCIÓN: Si habla español, tiene a ellis " disposición servicios gratuitos de asistencia lingüística. Alethea al [phone].    We comply with applicable federal civil rights laws and [place] laws. We do not discriminate on the basis of race, color, national origin, age, disability, sex, sexual orientation, or gender identity.            After Visit Summary       This is your record. Keep this with you and show to your community pharmacist(s) and doctor(s) at your next visit.

## 2017-12-13 ENCOUNTER — HOSPITAL ENCOUNTER (EMERGENCY)
Facility: CLINIC | Age: 27
Discharge: HOME | End: 2017-12-13
Attending: EMERGENCY MEDICINE | Admitting: EMERGENCY MEDICINE
Payer: MEDICAID

## 2017-12-13 VITALS
TEMPERATURE: 98.2 F | HEIGHT: 76 IN | SYSTOLIC BLOOD PRESSURE: 138 MMHG | WEIGHT: 229 LBS | OXYGEN SATURATION: 97 % | DIASTOLIC BLOOD PRESSURE: 91 MMHG | BODY MASS INDEX: 27.89 KG/M2

## 2017-12-13 DIAGNOSIS — R11.10 VOMITING AND DIARRHEA: ICD-10-CM

## 2017-12-13 DIAGNOSIS — R19.7 VOMITING AND DIARRHEA: ICD-10-CM

## 2017-12-13 LAB
ALBUMIN SERPL-MCNC: 3.7 G/DL (ref 3.4–5)
ALP SERPL-CCNC: 77 U/L (ref 40–150)
ALT SERPL W P-5'-P-CCNC: 44 U/L (ref 0–70)
ANION GAP SERPL CALCULATED.3IONS-SCNC: 9 MMOL/L (ref 3–14)
AST SERPL W P-5'-P-CCNC: 18 U/L (ref 0–45)
BASOPHILS # BLD AUTO: 0 10E9/L (ref 0–0.2)
BASOPHILS NFR BLD AUTO: 0.1 %
BILIRUB SERPL-MCNC: 0.9 MG/DL (ref 0.2–1.3)
BUN SERPL-MCNC: 19 MG/DL (ref 7–30)
CALCIUM SERPL-MCNC: 8.5 MG/DL (ref 8.5–10.1)
CHLORIDE SERPL-SCNC: 105 MMOL/L (ref 94–109)
CO2 SERPL-SCNC: 25 MMOL/L (ref 20–32)
CREAT SERPL-MCNC: 1.01 MG/DL (ref 0.66–1.25)
DIFFERENTIAL METHOD BLD: ABNORMAL
EOSINOPHIL # BLD AUTO: 0 10E9/L (ref 0–0.7)
EOSINOPHIL NFR BLD AUTO: 0 %
ERYTHROCYTE [DISTWIDTH] IN BLOOD BY AUTOMATED COUNT: 13 % (ref 10–15)
GFR SERPL CREATININE-BSD FRML MDRD: 87 ML/MIN/1.7M2
GLUCOSE SERPL-MCNC: 115 MG/DL (ref 70–99)
HCT VFR BLD AUTO: 43.7 % (ref 40–53)
HGB BLD-MCNC: 15.8 G/DL (ref 13.3–17.7)
IMM GRANULOCYTES # BLD: 0 10E9/L (ref 0–0.4)
IMM GRANULOCYTES NFR BLD: 0.2 %
LIPASE SERPL-CCNC: 78 U/L (ref 73–393)
LYMPHOCYTES # BLD AUTO: 0.9 10E9/L (ref 0.8–5.3)
LYMPHOCYTES NFR BLD AUTO: 9 %
MCH RBC QN AUTO: 29.8 PG (ref 26.5–33)
MCHC RBC AUTO-ENTMCNC: 36.2 G/DL (ref 31.5–36.5)
MCV RBC AUTO: 82 FL (ref 78–100)
MONOCYTES # BLD AUTO: 0.6 10E9/L (ref 0–1.3)
MONOCYTES NFR BLD AUTO: 5.6 %
NEUTROPHILS # BLD AUTO: 8.6 10E9/L (ref 1.6–8.3)
NEUTROPHILS NFR BLD AUTO: 85.1 %
NRBC # BLD AUTO: 0 10*3/UL
NRBC BLD AUTO-RTO: 0 /100
PLATELET # BLD AUTO: 158 10E9/L (ref 150–450)
POTASSIUM SERPL-SCNC: 3.5 MMOL/L (ref 3.4–5.3)
PROT SERPL-MCNC: 7.5 G/DL (ref 6.8–8.8)
RBC # BLD AUTO: 5.31 10E12/L (ref 4.4–5.9)
SODIUM SERPL-SCNC: 139 MMOL/L (ref 133–144)
WBC # BLD AUTO: 10.1 10E9/L (ref 4–11)

## 2017-12-13 PROCEDURE — 83690 ASSAY OF LIPASE: CPT | Performed by: EMERGENCY MEDICINE

## 2017-12-13 PROCEDURE — 85025 COMPLETE CBC W/AUTO DIFF WBC: CPT | Performed by: EMERGENCY MEDICINE

## 2017-12-13 PROCEDURE — 96374 THER/PROPH/DIAG INJ IV PUSH: CPT

## 2017-12-13 PROCEDURE — 96361 HYDRATE IV INFUSION ADD-ON: CPT

## 2017-12-13 PROCEDURE — 99284 EMERGENCY DEPT VISIT MOD MDM: CPT | Mod: 25

## 2017-12-13 PROCEDURE — 25000128 H RX IP 250 OP 636: Performed by: EMERGENCY MEDICINE

## 2017-12-13 PROCEDURE — 96375 TX/PRO/DX INJ NEW DRUG ADDON: CPT

## 2017-12-13 PROCEDURE — 80053 COMPREHEN METABOLIC PANEL: CPT | Performed by: EMERGENCY MEDICINE

## 2017-12-13 RX ORDER — DIPHENHYDRAMINE HYDROCHLORIDE 50 MG/ML
50 INJECTION INTRAMUSCULAR; INTRAVENOUS ONCE
Status: COMPLETED | OUTPATIENT
Start: 2017-12-13 | End: 2017-12-13

## 2017-12-13 RX ADMIN — DIPHENHYDRAMINE HYDROCHLORIDE 50 MG: 50 INJECTION, SOLUTION INTRAMUSCULAR; INTRAVENOUS at 14:21

## 2017-12-13 RX ADMIN — SODIUM CHLORIDE 1000 ML: 9 INJECTION, SOLUTION INTRAVENOUS at 14:11

## 2017-12-13 RX ADMIN — PROCHLORPERAZINE EDISYLATE 10 MG: 5 INJECTION INTRAMUSCULAR; INTRAVENOUS at 14:21

## 2017-12-13 ASSESSMENT — ENCOUNTER SYMPTOMS
VOMITING: 1
COUGH: 0
FEVER: 0
DIARRHEA: 1
ABDOMINAL PAIN: 1
SORE THROAT: 0
NAUSEA: 1
BACK PAIN: 1

## 2017-12-13 NOTE — ED NOTES
RN at bedside and pt given 2nd dose of zofran. Pt was to start PO challenge and before he could begin he became nauseous again

## 2017-12-13 NOTE — ED PROVIDER NOTES
History     Chief Complaint:  Abdominal pain    HPI   Ravi Dunne is a 27 year old male with a history of cyclic vomiting syndrome, anxiety and autism, on Zofran and Reglan as of last night, whose mother presents him to the emergency department for evaluation of abdominal pain. He was seen here last night for the same thing (see below). The patient reports nausea and vomiting. His mother states that they were here for four hours yesterday and he was given medications which seemed to help while in the emergency department. However, when they pulled out of the parking lot, he immediately said he was not feeling well again. His mother says that he was up all night with diarrhea and vomiting. Then, since this morning, he has been complaining of abdominal pain. She further endorses diaphoresis and a decreased appetite. She denies fevers. The patient endorses chest pain and back pain, but denies sore throat and cough. Her other son was at [facility] yesterday sick with diarrhea, but without vomiting.     [facility] Emergency Department 12/12/17  Liliya Marie MD    Interventions:  1932 Zofran, 4 mg, IV  1932 Lactated ringers 1000 mL IV  2049 Zofran 4 mg, IV  2113 Reglan, 1 mg, IV    Prescribed:  Ondansetron (Zofran ODT) 4 MG ODT tab  Metoclopramide (Reglan) 10 MG tablet     Allergies:  No Known Drug Allergies      Medications:    Zofran  Reglan  Levothyroxine  Prilosec  Trileptal  Lexapro  Seroquel    Past Medical History:    Anxiety  Autism  Autoimmune hypothyroidism  Hyperlipidemia   Development delay  Cyclic vomiting syndrome     Past Surgical History:    History reviewed. No pertinent past surgical history.     Family History:    Alcoholism - Father  Throat cancer - Father  Autism spectrum disorder- Brother    Social History:  The patient was accompanied to the ED by his mother.  Smoking Status: Never  Smokeless Tobacco: Never  Alcohol Use: No  Marital Status:  Single      Review of Systems  "  Constitutional: Negative for fever.   HENT: Negative for sore throat.    Respiratory: Negative for cough.    Cardiovascular: Positive for chest pain.   Gastrointestinal: Positive for abdominal pain, diarrhea, nausea and vomiting.   Musculoskeletal: Positive for back pain.   All other systems reviewed and are negative.    Physical Exam     Patient Vitals for the past 24 hrs:   BP Temp Temp src Heart Rate SpO2 Height Weight   12/13/17 1339 (!) 138/91 98.2  F (36.8  C) Oral 107 96 % 1.93 m (6' 4\") 103.9 kg (229 lb)       Physical Exam  Constitutional: White male supine  HENT: No signs of trauma.   Eyes: EOM are normal. Pupils are equal, round, and reactive to light.   Neck: Normal range of motion. No JVD present. No tracheal deviation present. No cervical adenopathy.  Cardiovascular: Regular rhythm.  Exam reveals no gallop and no friction rub.    No murmur heard.  Pulmonary/Chest: Bilateral breath sounds normal. No wheezes, rhonchi or rales.  Abdominal: Soft. No tenderness. No rebound or guarding.   Musculoskeletal: No edema. No tenderness.   Lymphadenopathy: No lymphadenopathy.   Neurological: Alert and oriented to person, place, and time. Normal strength. Coordination normal.   Skin: Skin is warm and dry. No rash noted. No erythema.   Psych: Occassionally asks very loud questions, but respond appropriately when asked to be quiet.     Emergency Department Course     Laboratory:  Laboratory findings were communicated with the patient and family who voiced understanding of the findings.    CBC:  WBC 10.1, HGB 15.8,    CMP: Glucose 115 o/w WNL (Creatinine 1.01)    Lipase: 78    Interventions:  1411 - NS Bolus 1,000mL IV  1421 - Compazine 10mg IV  1421 - Benadryl 50mg IV      Emergency Department Course:  Nursing notes and vitals reviewed.  IV was inserted and blood was drawn for laboratory testing, results above.  1339: I performed an exam of the patient as documented above.   1505: Patient rechecked and " updated.   Findings and plan explained to the Patient and mother. Patient discharged home with instructions regarding supportive care, medications, and reasons to return. The importance of close follow-up was reviewed.  I personally reviewed the laboratory results with the Patient and mother and answered all related questions prior to discharge.    Impression & Plan      Medical Decision Making:  Ravi Dunne is a 27 year old male with autism who comes in with diarrhea and vomiting. He was seen here last evening for the same. He got IV fluids and was discharged home with some Reglan. Symptoms probably are related to his brother who has diarrhea. Patient had more symptoms through the night and his mother brought him back in today. On exam, his abdomen is soft, completely nontender. He does no exhibit vomiting while I am in the room. Vital signs remain stable. IV was started and received additional saline. Labs were checked, which were unremarkable. At this time, I did given him compazine and benadryl, which has helped and he has no longer vomited but he is having some akisthesia from this and is anxious and wants to go. He did drink fluids and mother is okay taking him home. She did not, however, want a compazine prescription to go;  she will use the Reglan that she was given last night.     Diagnosis:    ICD-10-CM    1. Vomiting and diarrhea R11.10     R19.7    2.     Autism    Disposition:  Discharged to home.    Scribe Disclosure:  I, Jami Tamez, am serving as a scribe at 1:39 PM on 12/13/2017 to document services personally performed by Leo Bhatia MD based on my observations and the provider's statements to me.   12/13/2017    EMERGENCY DEPARTMENT       Leo Bhatia MD  12/13/17 0928

## 2017-12-13 NOTE — ED PROVIDER NOTES
"  History     Chief Complaint:  Nausea and vomiting    HPI   Ravi Dunne is a 27 year old male with autism who presents with nausea and vomiting. The patient's mother reports that while out 2 hours ago getting medication for the patient's brother who is experiencing nausea, abdominal pain and diarrhea, he began experiencing nausea and vomiting as well. While here in the ED he states that he is also experiencing abdominal pain but no diarrhea. They would like to be evaluated for flu like symptoms currently going around their household.    Allergies:  No Known Drug Allergies     Medications:    Synthroid  Prilosec  Zyrtec  Trileptal  Lexapro  Seroquel    Past Medical History:    Anxiety  Autism  Autoimmune hypothyroidism  Cyclic vomiting syndrome  Development delay  Hyperlipidemia    Past Surgical History:    History reviewed. No pertinent past surgical history.    Family History:    Alcohol abuse  Cancer  Autism spectrum disorder    Social History:  The patient was accompanied to the ED by his mother.  Smoking Status: No  Smokeless Tobacco: No  Alcohol Use: No  Marital Status:  Single      Review of Systems   Gastrointestinal: Positive for abdominal pain, nausea and vomiting.   All other systems reviewed and are negative.    Physical Exam   Vitals:  Patient Vitals for the past 24 hrs:   BP Temp Temp src Heart Rate Resp SpO2 Height Weight   12/12/17 2130 (!) 144/94 - - - - - - -   12/12/17 2051 (!) 154/93 - - - - - - -   12/12/17 1935 - - - 87 18 96 % - -   12/12/17 1843 (!) 149/101 95.1  F (35.1  C) Temporal 117 16 96 % 1.93 m (6' 4\") 99.8 kg (220 lb)     Physical Exam  General/Appearance: appears stated age, well-groomed, appears comfortable  Eyes: EOMI, no scleral injection, no icterus  ENT: MMM  Neck: supple, nl ROM, no stiffness  Cardiovascular: mild tachy but regular, nl S1S2, no m/r/g, 2+ pulses in all 4 extremities, cap refill <2sec  Respiratory: CTAB, good air movement throughout, no " wheezes/rhonchi/rales, no increased WOB, no retractions  GI: abd soft, non-distended, nttp,  no HSM, no rebound, no guarding, nl BS  MSK: BAL, good tone, no bony abnormality  Skin: warm and well-perfused, no rash, no edema, no ecchymosis, nl turgor  Neuro: alert   Heme: no petechia, no purpura, no active bleeding    Emergency Department Course     Interventions:  1932 Zofran, 4 mg, IV  1932 Lactated ringers 1000 mL IV  2049 Zofran 4 mg, IV  2113 Reglan, 1 mg, IV     Emergency Department Course:  Nursing notes and vitals reviewed.  1904 I had my initial encounter with the patient.  I performed an exam of the patient as documented above.   2104 I rechecked the patient's condition.  2223 I rechecked the patient's condition.    2225 I discussed the treatment plan with the patient. They expressed understanding of this plan and consented to discharge. They will be discharged home with instructions for care and follow up. In addition, the patient will return to the emergency department if their symptoms persist, worsen, if new symptoms arise or if there is any concern.  All questions were answered.    Impression & Plan      Medical Decision Making:  This pt presents for evaluation of nausea and vomiting with minimal abdominal pain in a nonfocal abdominal exam. The differential diagnosis of nausea and vomiting is broad and includes such etiologies as viral gastroenteritis, bacterial infection of the large intestine (salmonella, shigella, campylobacter, e coli, etc), bowel obstruction, intra-abdominal infection such as colitis, cholecystitis, UTI, pyelonephritis, appendicitis, etc.  There are no signs of worrisome intra-abdominal pathologies detected during the visit today.  The patient has a completely benign abdominal exam without rebound, guarding, or marked tenderness to palpation. The patient improved with iv fluids and anti-emetics. It's also highly suggestive of a viral etiology given that his sibling has similar sxs  at home. Supportive outpatient management is therefore indicated.  Abdominal pain precautions are given for home.   No indication for stool studies at this time.  No indication for CT at this time.  It was discussed with the patient to return to the ED for blood in stool, increasing pain, or fevers more than 102.   Feels much improved after interventions in ED.     Diagnosis:    ICD-10-CM    1. Gastroenteritis K52.9      Disposition:   Discharge    Discharge Medications:  New Prescriptions    METOCLOPRAMIDE (REGLAN) 10 MG TABLET    Take 1 tablet (10 mg) by mouth every 8 hours as needed (Nausea or Vomiting)    ONDANSETRON (ZOFRAN ODT) 4 MG ODT TAB    Take 1 tablet (4 mg) by mouth every 6 hours as needed for nausea     Scribe Disclosure:  I, Asher Santos, am serving as a scribe at 6:52 PM on 12/12/2017 to document services personally performed by Elly Marie*, based on my observations and the provider's statements to me.    12/12/2017    EMERGENCY DEPARTMENT       Elly Marie MD  12/12/17 4507

## 2017-12-13 NOTE — ED AVS SNAPSHOT
Emergency Department    [address] [place] [address]    Phone:  [phone]    Fax:  [phone]                                       Ravi Dunne   MRN: [id number]    Department:   Emergency Department   Date of Visit:  12/13/2017           After Visit Summary Signature Page     I have received my discharge instructions, and my questions have been answered. I have discussed any challenges I see with this plan with the nurse or doctor.    ..........................................................................................................................................  Patient/Patient Representative Signature      ..........................................................................................................................................  Patient Representative Print Name and Relationship to Patient    ..................................................               ................................................  Date                                            Time    ..........................................................................................................................................  Reviewed by Signature/Title    ...................................................              ..............................................  Date                                                            Time

## 2017-12-13 NOTE — ED AVS SNAPSHOT
Emergency Department    [address] [facility] [address]    Phone:  [phone]    Fax:  [phone]                                       Ravi Dunne   MRN: [id number]    Department:   Emergency Department   Date of Visit:  12/13/2017           Patient Information     Date Of Birth          1990        Your diagnoses for this visit were:     Vomiting and diarrhea        You were seen by Leo Bhatia MD.      Follow-up Information     Schedule an appointment as soon as possible for a visit with Bhaskar Gandara MD.    Specialty:  Internal Medicine    Why:  recheck ed, If symptoms worsen    Contact information:    [address] [facility] [address]  [phone]        24 Hour Appointment Hotline       To make an appointment at any [facility] (formerly [facility]3], call [phone] ([phone]). If you don't have a family doctor or clinic, we will help you find one. [facility] are conveniently located to serve the needs of you and your family.             Review of your medicines      Our records show that you are taking the medicines listed below. If these are incorrect, please call your family doctor or clinic.        Dose / Directions Last dose taken    cetirizine 10 MG tablet   Commonly known as:  zyrTEC   Quantity:  90 tablet        TAKE 1 TABLET(10 MG) BY MOUTH DAILY AS NEEDED FOR ALLERGIES   Refills:  2        escitalopram 20 MG tablet   Commonly known as:  LEXAPRO   Dose:  20 mg        Take 1 tablet by mouth At Bedtime.   Refills:  0        levothyroxine 125 MCG tablet   Commonly known as:  SYNTHROID/LEVOTHROID   Dose:  125 mcg   Quantity:  90 tablet        Take 1 tablet (125 mcg) by mouth daily   Refills:  3        metoclopramide 10 MG tablet   Commonly known as:  REGLAN   Dose:  10 mg   Quantity:  10 tablet        Take 1 tablet (10 mg) by mouth every 8 hours as needed (Nausea or Vomiting)   Refills:  0        omeprazole 20 MG CR capsule   Commonly known as:   priLOSEC   Quantity:  90 capsule        TAKE 1 CAPSULE(20 MG) BY MOUTH DAILY   Refills:  2        ondansetron 4 MG ODT tab   Commonly known as:  ZOFRAN ODT   Dose:  4 mg   Quantity:  10 tablet        Take 1 tablet (4 mg) by mouth every 6 hours as needed for nausea   Refills:  0        * OXcarbazepine 300 MG tablet   Commonly known as:  TRILEPTAL   Dose:  900 mg        Take 900 mg by mouth At Bedtime   Refills:  0        * OXcarbazepine 300 MG tablet   Commonly known as:  TRILEPTAL   Dose:  600 mg        Take 600 mg by mouth daily   Refills:  0        * SEROQUEL 50 MG tablet   Dose:  50 mg   Generic drug:  QUEtiapine        Take 50 mg by mouth 2 times daily 6pm and HS   Refills:  0        * SEROQUEL 25 MG tablet   Dose:  25 mg   Generic drug:  QUEtiapine        Take 25 mg by mouth daily At 3pm   Refills:  0        * Notice:  This list has 4 medication(s) that are the same as other medications prescribed for you. Read the directions carefully, and ask your doctor or other care provider to review them with you.            Procedures and tests performed during your visit     CBC with platelets + differential    Comprehensive metabolic panel    Lipase      Orders Needing Specimen Collection     None      Pending Results     No orders found from 12/11/2017 to 12/14/2017.            Pending Culture Results     No orders found from 12/11/2017 to 12/14/2017.            Pending Results Instructions     If you had any lab results that were not finalized at the time of your Discharge, you can call the ED Lab Result RN at [phone]. You will be contacted by this team for any positive Lab results or changes in treatment. The nurses are available 7 days a week from 10A to 6:30P.  You can leave a message 24 hours per day and they will return your call.        Test Results From Your Hospital Stay        12/13/2017  2:24 PM      Component Results     Component Value Ref Range & Units Status    WBC 10.1 4.0 - 11.0 10e9/L Final     RBC Count 5.31 4.4 - 5.9 10e12/L Final    Hemoglobin 15.8 13.3 - 17.7 g/dL Final    Hematocrit 43.7 40.0 - 53.0 % Final    MCV 82 78 - 100 fl Final    MCH 29.8 26.5 - 33.0 pg Final    MCHC 36.2 31.5 - 36.5 g/dL Final    RDW 13.0 10.0 - 15.0 % Final    Platelet Count 158 150 - 450 10e9/L Final    Diff Method Automated Method  Final    % Neutrophils 85.1 % Final    % Lymphocytes 9.0 % Final    % Monocytes 5.6 % Final    % Eosinophils 0.0 % Final    % Basophils 0.1 % Final    % Immature Granulocytes 0.2 % Final    Nucleated RBCs 0 0 /100 Final    Absolute Neutrophil 8.6 (H) 1.6 - 8.3 10e9/L Final    Absolute Lymphocytes 0.9 0.8 - 5.3 10e9/L Final    Absolute Monocytes 0.6 0.0 - 1.3 10e9/L Final    Absolute Eosinophils 0.0 0.0 - 0.7 10e9/L Final    Absolute Basophils 0.0 0.0 - 0.2 10e9/L Final    Abs Immature Granulocytes 0.0 0 - 0.4 10e9/L Final    Absolute Nucleated RBC 0.0  Final         12/13/2017  2:48 PM      Component Results     Component Value Ref Range & Units Status    Sodium 139 133 - 144 mmol/L Final    Potassium 3.5 3.4 - 5.3 mmol/L Final    Chloride 105 94 - 109 mmol/L Final    Carbon Dioxide 25 20 - 32 mmol/L Final    Anion Gap 9 3 - 14 mmol/L Final    Glucose 115 (H) 70 - 99 mg/dL Final    Urea Nitrogen 19 7 - 30 mg/dL Final    Creatinine 1.01 0.66 - 1.25 mg/dL Final    GFR Estimate 87 >60 mL/min/1.7m2 Final    Non  GFR Calc    GFR Estimate If Black >90 >60 mL/min/1.7m2 Final    African American GFR Calc    Calcium 8.5 8.5 - 10.1 mg/dL Final    Bilirubin Total 0.9 0.2 - 1.3 mg/dL Final    Albumin 3.7 3.4 - 5.0 g/dL Final    Protein Total 7.5 6.8 - 8.8 g/dL Final    Alkaline Phosphatase 77 40 - 150 U/L Final    ALT 44 0 - 70 U/L Final    AST 18 0 - 45 U/L Final         12/13/2017  2:48 PM      Component Results     Component Value Ref Range & Units Status    Lipase 78 73 - 393 U/L Final                Clinical Quality Measure: Blood Pressure Screening     Your blood pressure was checked  "while you were in the emergency department today. The last reading we obtained was  BP: (!) 138/91 . Please read the guidelines below about what these numbers mean and what you should do about them.  If your systolic blood pressure (the top number) is less than 120 and your diastolic blood pressure (the bottom number) is less than 80, then your blood pressure is normal. There is nothing more that you need to do about it.  If your systolic blood pressure (the top number) is 120-139 or your diastolic blood pressure (the bottom number) is 80-89, your blood pressure may be higher than it should be. You should have your blood pressure rechecked within a year by a primary care provider.  If your systolic blood pressure (the top number) is 140 or greater or your diastolic blood pressure (the bottom number) is 90 or greater, you may have high blood pressure. High blood pressure is treatable, but if left untreated over time it can put you at risk for heart attack, stroke, or kidney failure. You should have your blood pressure rechecked by a primary care provider within the next 4 weeks.  If your provider in the emergency department today gave you specific instructions to follow-up with your doctor or provider even sooner than that, you should follow that instruction and not wait for up to 4 weeks for your follow-up visit.        Thank you for choosing [facility]       Thank you for choosing [facility] for your care. Our goal is always to provide you with excellent care. Hearing back from our patients is one way we can continue to improve our services. Please take a few minutes to complete the written survey that you may receive in the mail after you visit with us. Thank you!        [employer] Information     [employer] lets you send messages to your doctor, view your test results, renew your prescriptions, schedule appointments and more. To sign up, go to [email or web address] . Click on \"Log in\" on the left side of the screen, " "which will take you to the Welcome page. Then click on \"Sign up Now\" on the right side of the page.     You will be asked to enter the access code listed below, as well as some personal information. Please follow the directions to create your username and password.     Your access code is: [email or web address]  Expires: 3/12/2018 10:27 PM     Your access code will  in 90 days. If you need help or a new code, please call your [place] clinic or [phone].        Care EveryWhere ID     This is your Care EveryWhere ID. This could be used by other organizations to access your [place] medical records  [id number]        Equal Access to Services     IDA HYDE : Tanya Bueno, karlie narayanan, casey fisher, jalil jackson. So [facility] [phone].    ATENCIÓN: Si habla español, tiene a ellis disposición servicios gratuitos de asistencia lingüística. Llame al [phone].    We comply with applicable federal civil rights laws and [place] laws. We do not discriminate on the basis of race, color, national origin, age, disability, sex, sexual orientation, or gender identity.            After Visit Summary       This is your record. Keep this with you and show to your community pharmacist(s) and doctor(s) at your next visit.                  "

## 2017-12-13 NOTE — DISCHARGE INSTRUCTIONS
"   * FOOD POISONING or VIRAL GASTROENTERITIS (6yr-Adult)  FOOD POISONING may occur from 6 to 24 hours after eating food that has spoiled and lasts up to1-2 days. VIRAL GASTRO-ENTERITIS is commonly known as the \"stomach flu\" and may last 2-7 days. Symptoms of both illnesses may include vomiting, diarrhea, fever, abdominal cramping. Antibiotics are not effective, but simple home treatment will be helpful.  HOME CARE:    If symptoms are severe, rest at home for the next 24 hours.    Avoid tobacco and alcohol. These may worsen your symptoms.    If medicines for diarrhea (low dose of Immodium: one tablet a day for an adult) or vomiting were prescribed, take only as directed.   During the first 12 to 24 hours follow the diet below:    DRINKS: Sport drinks like Gatorade, soft drinks without caffeine; ginger ale, mineral water (plain or flavored), decaffeinated tea and coffee.    SOUPS: Clear broth, consommé and bouillon    DESSERTS: Plain gelatin (Jell-O), popsicles and fruit juice bars.  During the next 24 hours you may add the following to the above:    Hot cereal, plain toast, bread, rolls, crackers    Plain noodles, rice, mashed potatoes, chicken noodle or rice soup    Unsweetened canned fruit (avoid pineapple), bananas    Limit fat intake to less than 15 grams per day by avoiding margarine, butter, oils, mayonnaise, sauces, gravies, fried foods, peanut butter, meat, poultry and fish.    Limit fiber; avoid raw or cooked vegetables, fresh fruits (except bananas) and bran cereals.    Limit caffeine and chocolate. No spices or seasonings except salt.  Slowly go back to a normal diet as you feel better and your symptoms lessen.  FOLLOW UP with your doctor as advised if you are not better in 2 days. If a stool (diarrhea) sample was taken, you may call in 2 days (or as directed) for the results.  GET PROMPT MEDICAL ATTENTION if any of the following occur:    Increasing abdominal pain or constant pain in one spot    Continued " vomiting (unable to keep liquids down)    Frequent diarrhea (more than 5 times a day)    Blood in vomit or stool (black or red color)    Unable to take in fluids at all    No urine output for 12 hours or extreme thirst    Weakness, dizziness, fainting    Drowsiness, confusion, stiff neck or seizure    Fever over 101.0  F (38.3  C) for more than 3 days    New rash    0747-0973 [employer]. [address] [place] [address], [place] [id number]. All rights reserved. This information is not intended as a substitute for professional medical care. Always follow your healthcare professional's instructions.  This information has been modified by your health care provider with permission from the publisher.

## 2017-12-14 ENCOUNTER — TELEPHONE (OUTPATIENT)
Dept: INTERNAL MEDICINE | Facility: CLINIC | Age: 27
End: 2017-12-14

## 2017-12-14 DIAGNOSIS — R11.15 INTRACTABLE CYCLICAL VOMITING WITH NAUSEA: Primary | ICD-10-CM

## 2017-12-14 RX ORDER — ONDANSETRON 4 MG/1
4-8 TABLET, ORALLY DISINTEGRATING ORAL EVERY 8 HOURS PRN
Qty: 20 TABLET | Refills: 1 | Status: SHIPPED | OUTPATIENT
Start: 2017-12-14 | End: 2019-04-03

## 2017-12-14 NOTE — TELEPHONE ENCOUNTER
"I would recommend trying the Zofran dissolvable tablets under the tongue every 6-8 hours as needed for nausea/vomiting.   The dissolvable form should be pretty easy to take.  Prescription(s) sent electronically to [facility].     Take over the counter Imodium for any diarrhea.  There are chewable tablets for imodium.       If there are other people in the house who have similar illness, this could be viral gastroenteritis (\"stomach flu\").  There have been numerous reports of this going around since Thanksgiving.  This might explain why it could be different than his usual vomiting episodes.   No special treatment is required, the symptoms goes away after 2-3 days.    The most important thing is to try and remain hydrated, drinking small ammount of liquids frequently.  Pedialyte is recommended due to the extra electrolytes.    "

## 2017-12-14 NOTE — TELEPHONE ENCOUNTER
Patient mom calling and states that patient has been to the ER twice in the past week for nausea/vomiting and diarrhea.  Pt was given IV fluids and anti nausea meds.  He has not vomited since being home, but diarrhea continues and he continues to have nausea but no vomiting.  C/o being hot.  Is afebrile.  Was given an rx for reglan, pt is refusing to take.  He has been drinking some sips of fluids, but is often refusing to do this also due to upset stomach.  Mom wanted to update PCP and see if there is anything else that they should do.      Did advise to push clear liquids, even if will only take sips.  Also advised to avoid dairy and if pt feels like eating to try dry toast or crackers.      Please advise if needed.

## 2017-12-14 NOTE — TELEPHONE ENCOUNTER
Patient's mother informed and also advised if further questions/concerns or if symptoms do not improve, worsen or new symptoms develop, call your PCP or [facility] as soon as possible.

## 2017-12-15 ENCOUNTER — OFFICE VISIT (OUTPATIENT)
Dept: INTERNAL MEDICINE | Facility: CLINIC | Age: 27
End: 2017-12-15
Payer: MEDICAID

## 2017-12-15 VITALS
WEIGHT: 225.9 LBS | BODY MASS INDEX: 27.5 KG/M2 | OXYGEN SATURATION: 97 % | DIASTOLIC BLOOD PRESSURE: 82 MMHG | SYSTOLIC BLOOD PRESSURE: 126 MMHG | TEMPERATURE: 97.6 F | HEART RATE: 62 BPM

## 2017-12-15 DIAGNOSIS — A08.4 VIRAL GASTROENTERITIS: Primary | ICD-10-CM

## 2017-12-15 DIAGNOSIS — E06.3 AUTOIMMUNE HYPOTHYROIDISM: ICD-10-CM

## 2017-12-15 PROCEDURE — 99213 OFFICE O/P EST LOW 20 MIN: CPT | Performed by: INTERNAL MEDICINE

## 2017-12-15 NOTE — MR AVS SNAPSHOT
"              After Visit Summary   12/15/2017    Ravi Dunne    MRN: [id number]           Patient Information     Date Of Birth          1990        Visit Information        Provider Department      12/15/2017 9:00 AM Bhaskar Gandara MD [facility]        Today's Diagnoses     Viral gastroenteritis    -  1    Autoimmune hypothyroidism          Care Instructions    *  I suspect that you have viral gastroenteritis (\"stomach flu\"), it is around the community right now.     *  viral gastroenteritis (\"stomach flu\") is very contagious.  I would recommend that you stay away from work until your symptoms are improving.      *  The acute diarrhea usually last 24-48 hours, then the bowel will slowly return to normal.      *  The most important thing is to remain well hydrated.  Consider water, or Pedialyte.  Avoid Gatorade and Powerade because they have a lot of sugars.     *  Ok to use over the counter Imodium for loose stools, follow the instructions on the bottle.     *  If you symptoms worsen, contact us (i.e. New fevers, inability to keep down foods, worsening abdominal pains) because these can be signs and symptoms of worsening problems.              Follow-ups after your visit        Who to contact     If you have questions or need follow up information about today's clinic visit or your schedule please contact [facility] directly at [phone].  Normal or non-critical lab and imaging results will be communicated to you by MyChart, letter or phone within 4 business days after the clinic has received the results. If you do not hear from us within 7 days, please contact the clinic through MyChart or phone. If you have a critical or abnormal lab result, we will notify you by phone as soon as possible.  Submit refill requests through [employer] or call your pharmacy and they will forward the refill request to us. Please allow 3 business days for your " "refill to be completed.          Additional Information About Your Visit        [employer] Information     [employer] lets you send messages to your doctor, view your test results, renew your prescriptions, schedule appointments and more. To sign up, go to [email or web address] . Click on \"Log in\" on the left side of the screen, which will take you to the Welcome page. Then click on \"Sign up Now\" on the right side of the page.     You will be asked to enter the access code listed below, as well as some personal information. Please follow the directions to create your username and password.     Your access code is: [email or web address]  Expires: 3/12/2018 10:27 PM     Your access code will  in 90 days. If you need help or a new code, please call your [place] clinic or [phone].        Care EveryWhere ID     This is your Care EveryWhere ID. This could be used by other organizations to access your [place] medical records  [id number]        Your Vitals Were     Pulse Temperature Pulse Oximetry BMI (Body Mass Index)          62 97.6  F (36.4  C) (Oral) 97% 27.5 kg/m2         Blood Pressure from Last 3 Encounters:   12/15/17 126/82   17 (!) 138/91   17 (!) 144/94    Weight from Last 3 Encounters:   12/15/17 225 lb 14.4 oz (102.5 kg)   17 229 lb (103.9 kg)   17 220 lb (99.8 kg)              Today, you had the following     No orders found for display       Primary Care Provider Office Phone # Fax #    Bhaskar Gandara [phone]       [address] [place] [address]        Equal Access to Services     LUIS ANGEL HYDE : Tanya Bueno, karlie narayanan, casey poncealmaesa fisher, jalil jackson. So [facility] [phone].    ATENCIÓN: Si habla español, tiene a ellis disposición servicios gratuitos de asistencia lingüística. Llame al [phone].    We comply with applicable federal civil rights laws and [place] laws. We do not " discriminate on the basis of race, color, national origin, age, disability, sex, sexual orientation, or gender identity.            Thank you!     Thank you for choosing [facility]  for your care. Our goal is always to provide you with excellent care. Hearing back from our patients is one way we can continue to improve our services. Please take a few minutes to complete the written survey that you may receive in the mail after your visit with us. Thank you!             Your Updated Medication List - Protect others around you: Learn how to safely use, store and throw away your medicines at www.disposemymeds.org.          This list is accurate as of: 12/15/17 11:59 PM.  Always use your most recent med list.                   Brand Name Dispense Instructions for use Diagnosis    cetirizine 10 MG tablet    zyrTEC    90 tablet    TAKE 1 TABLET(10 MG) BY MOUTH DAILY AS NEEDED FOR ALLERGIES    Seasonal allergic rhinitis, unspecified allergic rhinitis trigger       escitalopram 20 MG tablet    LEXAPRO     Take 1 tablet by mouth At Bedtime.    Anxiety, Autism       levothyroxine 125 MCG tablet    SYNTHROID/LEVOTHROID    90 tablet    Take 1 tablet (125 mcg) by mouth daily    Autoimmune hypothyroidism       metoclopramide 10 MG tablet    REGLAN    10 tablet    Take 1 tablet (10 mg) by mouth every 8 hours as needed (Nausea or Vomiting)        omeprazole 20 MG CR capsule    priLOSEC    90 capsule    TAKE 1 CAPSULE(20 MG) BY MOUTH DAILY    Cyclical vomiting with nausea       * ondansetron 4 MG ODT tab    ZOFRAN ODT    10 tablet    Take 1 tablet (4 mg) by mouth every 6 hours as needed for nausea        * ondansetron 4 MG ODT tab    ZOFRAN ODT    20 tablet    Take 1-2 tablets (4-8 mg) by mouth every 8 hours as needed for nausea    Intractable cyclical vomiting with nausea       * OXcarbazepine 300 MG tablet    TRILEPTAL     Take 900 mg by mouth At Bedtime        * OXcarbazepine 300 MG tablet    TRILEPTAL      Take 600 mg by mouth daily        * SEROQUEL 50 MG tablet   Generic drug:  QUEtiapine      Take 50 mg by mouth 2 times daily 6pm and HS    Autism, Anxiety       * SEROQUEL 25 MG tablet   Generic drug:  QUEtiapine      Take 25 mg by mouth daily At 3pm    Autism, Anxiety       * Notice:  This list has 6 medication(s) that are the same as other medications prescribed for you. Read the directions carefully, and ask your doctor or other care provider to review them with you.

## 2017-12-15 NOTE — PROGRESS NOTES
SUBJECTIVE:   Ravi Dunne is a 27 year old male who presents to clinic today for the following health issues:      ED/UC Followup:    Facility:  [facility]  Date of visit: 12/12/17 and 12/13/17  Reason for visit: Vomiting  Current Status: better    vomitting was similar to episodes of prior cyclical vomiting, but the diarrhea as we not.     His brohter had similar sx a few days earlier.              Problem list and histories reviewed & adjusted, as indicated.  Additional history: as documented        Reviewed and updated as needed this visit by clinical staff  Tobacco  Allergies  Meds  Med Hx  Surg Hx  Fam Hx  Soc Hx      Reviewed and updated as needed this visit by Provider           Past Medical History:  ---------------------------  Past Medical History:   Diagnosis Date     Anxiety      Autism      Autoimmune hypothyroidism 02/14/2017    positive anti-thyroid antibodies     Cyclic vomiting syndrome      Development delay      Hyperlipidemia 2008    elevated triglycerides [phone]       Past Surgical History:  ---------------------------  Past Surgical History:   Procedure Laterality Date     NO HISTORY OF SURGERY         Current Medications:  ---------------------------  Current Outpatient Prescriptions   Medication Sig Dispense Refill     ondansetron (ZOFRAN ODT) 4 MG ODT tab Take 1-2 tablets (4-8 mg) by mouth every 8 hours as needed for nausea 20 tablet 1     metoclopramide (REGLAN) 10 MG tablet Take 1 tablet (10 mg) by mouth every 8 hours as needed (Nausea or Vomiting) 10 tablet 0     levothyroxine (SYNTHROID/LEVOTHROID) 125 MCG tablet Take 1 tablet (125 mcg) by mouth daily 90 tablet 3     omeprazole (PRILOSEC) 20 MG CR capsule TAKE 1 CAPSULE(20 MG) BY MOUTH DAILY 90 capsule 2     cetirizine (ZYRTEC) 10 MG tablet TAKE 1 TABLET(10 MG) BY MOUTH DAILY AS NEEDED FOR ALLERGIES 90 tablet 2     OXcarbazepine (TRILEPTAL) 300 MG tablet Take 900 mg by mouth At Bedtime       escitalopram (LEXAPRO) 20  MG tablet Take 1 tablet by mouth At Bedtime.       QUEtiapine (SEROQUEL) 50 MG tablet Take 50 mg by mouth 2 times daily 6pm and HS       QUEtiapine (SEROQUEL) 25 MG tablet Take 25 mg by mouth daily At 3pm       oxcarbazepine (TRILEPTAL) 300 MG tablet Take 600 mg by mouth daily          Allergies:  -------------  Allergies   Allergen Reactions     No Clinical Screening - See Comments      rylee       Social History:  -------------------  Social History     Social History     Marital status: Single     Spouse name: N/A     Number of children: N/A     Years of education: N/A     Occupational History     Not on file.     Social History Main Topics     Smoking status: Never Smoker     Smokeless tobacco: Never Used     Alcohol use No     Drug use: No     Sexual activity: No     Other Topics Concern     Not on file     Social History Narrative       Family Medical History:  ------------------------------  Family History   Problem Relation Age of Onset     Alcoholism Father      CANCER Father 50     throat cancer from smoking; in remission with chemo and surgery     Autism Spectrum Disorder Brother          ROS:  REVIEW OF SYSTEMS:    RESP: negative for cough, dyspnea, wheezing, hemoptysis  CV: negative for chest pain, palpitations, PND, GONZALEZ, orthopnea; reports no changes in their ability to perform physical activity (from cardiovascular standpoint)  GI: negative for dysphagia, pain, melena,and constipation  NEURO: negative for numbness/tingling, paralysis, incoordination, or focal weakness     OBJECTIVE:                                                    /82  Pulse 62  Temp 97.6  F (36.4  C) (Oral)  Wt 225 lb 14.4 oz (102.5 kg)  SpO2 97%  BMI 27.5 kg/m2     GENERAL alert and no distress  EYES:  Normal sclera,conjunctiva, EOMI  HENT: oral and posterior pharynx without lesions or erythema, facies symmetric  NECK: Neck supple. No LAD, without thyroidmegaly or JVD., Carotids without bruits.  RESP: Clear to  "ausculation bilaterally without wheezes or crackles. Normal BS in all fields.  CV: RRR normal S1S2 without murmurs, rubs or gallops. PMI normal  LYMPH: no cervical lymph adenopathy appreciated  MS: extremities- no gross deformities of the visible extremities noted, no edema  PSYCH: Alert and oriented times 3; speech- coherent  SKIN:  No obvious significant skin lesions on visible portions of face  ABD:  Soft, nontender, lissa sounds normal         ASSESSMENT/PLAN:                                                      (A08.4) Viral gastroenteritis  (primary encounter diagnosis)  Comment: resolving viral gastroenteritis (\"stomach flu\")   Plan:     (E03.9) Autoimmune hypothyroidism  Comment: Discussed signs and symptoms of hypo and hyperthyroidism.  Reviewed treatment options.   Recommended absolute medication compliance to avoid adding any additionial variance to the labs.   Plan:       See Patient Instructions    DENICE BROWER M.D., MD  [facility]   "

## 2017-12-15 NOTE — NURSING NOTE
"Chief Complaint   Patient presents with     ER F/U       Initial /82  Pulse 62  Temp 97.6  F (36.4  C) (Oral)  Wt 225 lb 14.4 oz (102.5 kg)  SpO2 97%  BMI 27.5 kg/m2 Estimated body mass index is 27.5 kg/(m^2) as calculated from the following:    Height as of 12/13/17: 6' 4\" (1.93 m).    Weight as of this encounter: 225 lb 14.4 oz (102.5 kg).  Medication Reconciliation: complete    "

## 2017-12-30 NOTE — PATIENT INSTRUCTIONS
"*  I suspect that you have viral gastroenteritis (\"stomach flu\"), it is around the community right now.     *  viral gastroenteritis (\"stomach flu\") is very contagious.  I would recommend that you stay away from work until your symptoms are improving.      *  The acute diarrhea usually last 24-48 hours, then the bowel will slowly return to normal.      *  The most important thing is to remain well hydrated.  Consider water, or Pedialyte.  Avoid Gatorade and Powerade because they have a lot of sugars.     *  Ok to use over the counter Imodium for loose stools, follow the instructions on the bottle.     *  If you symptoms worsen, contact us (i.e. New fevers, inability to keep down foods, worsening abdominal pains) because these can be signs and symptoms of worsening problems.      "

## 2018-01-22 DIAGNOSIS — J30.2 SEASONAL ALLERGIC RHINITIS: ICD-10-CM

## 2018-01-23 RX ORDER — CETIRIZINE HYDROCHLORIDE 10 MG/1
TABLET ORAL
Qty: 90 TABLET | Refills: 2 | Status: SHIPPED | OUTPATIENT
Start: 2018-01-23 | End: 2018-04-02

## 2018-01-23 NOTE — TELEPHONE ENCOUNTER
"Requested Prescriptions   Pending Prescriptions Disp Refills     cetirizine (ZYRTEC) 10 MG tablet [Pharmacy Med Name: CETIRIZINE 10MG TABLETS] 90 tablet 0      Last Written Prescription Date:  04/11/17  Last Fill Quantity: 90,  # refills: 2   Last Office Visit with G, P or [facility] prescribing provider:  12/15/17   Future Office Visit:   0       Sig: TAKE 1 TABLET(10 MG) BY MOUTH DAILY AS NEEDED FOR ALLERGIES    Antihistamines Protocol Passed    1/22/2018 10:52 AM       Passed - Recent or future visit with authorizing provider's specialty    Patient had office visit in the last year or has a visit in the next 30 days with authorizing provider.  See \"Patient Info\" tab in inbasket, or \"Choose Columns\" in Meds & Orders section of the refill encounter.            Passed - Patient is age 3 or older    Apply age and/or weight-based dosing for peds patients age 3 and older.    Forward request to provider for patients under the age of 3.          "

## 2018-02-26 DIAGNOSIS — R11.15 CYCLICAL VOMITING WITH NAUSEA: ICD-10-CM

## 2018-04-02 DIAGNOSIS — J30.2 SEASONAL ALLERGIC RHINITIS: ICD-10-CM

## 2018-04-02 NOTE — TELEPHONE ENCOUNTER
"Requested Prescriptions   Pending Prescriptions Disp Refills     cetirizine (ZYRTEC) 10 MG tablet [Pharmacy Med Name: CETIRIZINE 10MG TABLETS] 90 tablet 0      Last Written Prescription Date:  01/23/18  Last Fill Quantity: 90,  # refills: 2   Last office visit: 12/15/2017 with prescribing provider:  12/15/17   Future Office Visit:  0 Sig: TAKE 1 TABLET(10 MG) BY MOUTH DAILY AS NEEDED FOR ALLERGIES    Antihistamines Protocol Passed    4/2/2018 10:30 AM       Passed - Patient is 3-64 years of age    Apply weight-based dosing for peds patients age 3 - 12 years of age.    Forward request to provider for patients under the age of 3 or over the age of 64.         Passed - Recent (12 mo) or future (30 days) visit within the authorizing provider's specialty    Patient had office visit in the last 12 months or has a visit in the next 30 days with authorizing provider or within the authorizing provider's specialty.  See \"Patient Info\" tab in inbasket, or \"Choose Columns\" in Meds & Orders section of the refill encounter.            "

## 2018-04-03 RX ORDER — CETIRIZINE HYDROCHLORIDE 10 MG/1
TABLET ORAL
Qty: 90 TABLET | Refills: 2 | Status: SHIPPED | OUTPATIENT
Start: 2018-04-03 | End: 2019-01-12

## 2018-05-02 ENCOUNTER — OFFICE VISIT (OUTPATIENT)
Dept: INTERNAL MEDICINE | Facility: CLINIC | Age: 28
End: 2018-05-02
Payer: MEDICAID

## 2018-05-02 VITALS
DIASTOLIC BLOOD PRESSURE: 84 MMHG | TEMPERATURE: 97.6 F | BODY MASS INDEX: 27.77 KG/M2 | HEART RATE: 64 BPM | OXYGEN SATURATION: 94 % | SYSTOLIC BLOOD PRESSURE: 110 MMHG | WEIGHT: 228.1 LBS

## 2018-05-02 DIAGNOSIS — E06.3 AUTOIMMUNE HYPOTHYROIDISM: ICD-10-CM

## 2018-05-02 DIAGNOSIS — E78.5 HYPERLIPIDEMIA LDL GOAL <160: ICD-10-CM

## 2018-05-02 DIAGNOSIS — R50.9 FEVER, UNSPECIFIED FEVER CAUSE: Primary | ICD-10-CM

## 2018-05-02 LAB — TSH SERPL DL<=0.005 MIU/L-ACNC: 0.8 MU/L (ref 0.4–4)

## 2018-05-02 PROCEDURE — 84443 ASSAY THYROID STIM HORMONE: CPT | Performed by: INTERNAL MEDICINE

## 2018-05-02 PROCEDURE — 36415 COLL VENOUS BLD VENIPUNCTURE: CPT | Performed by: INTERNAL MEDICINE

## 2018-05-02 PROCEDURE — 99213 OFFICE O/P EST LOW 20 MIN: CPT | Performed by: INTERNAL MEDICINE

## 2018-05-02 NOTE — PATIENT INSTRUCTIONS
*  Return to see me in 6 months, sooner if needed.  Please get fasting labs done at the [facility] or any other [facility] Lab lab 1-2 days before this appointment.  If you get the labs done at another clinic, make arrangements with them directly.  The orders will be in place.  Eat nothing for at least 8 hours prior to having these labs drawn.  Call [phone] to schedule appointments at [facility].     *  Continue all medications at the same doses.  Contact your usual pharmacy if you need refills.     *  No evidence for any infecitous issues at this time.

## 2018-05-02 NOTE — MR AVS SNAPSHOT
After Visit Summary   5/2/2018    Ravi Dunne    MRN: [id number]           Patient Information     Date Of Birth          1990        Visit Information        Provider Department      5/2/2018 8:40 AM Bhaskar Gandara MD [facility]        Today's Diagnoses     Fever, unspecified fever cause    -  1    Autoimmune hypothyroidism        Hyperlipidemia LDL goal <160          Care Instructions    *  Return to see me in 6 months, sooner if needed.  Please get fasting labs done at the [facility] or any other [facility] Lab lab 1-2 days before this appointment.  If you get the labs done at another clinic, make arrangements with them directly.  The orders will be in place.  Eat nothing for at least 8 hours prior to having these labs drawn.  Call [phone] to schedule appointments at [facility].     *  Continue all medications at the same doses.  Contact your usual pharmacy if you need refills.     *  No evidence for any infecitous issues at this time.           Follow-ups after your visit        Future tests that were ordered for you today     Open Future Orders        Priority Expected Expires Ordered    **TSH with free T4 reflex FUTURE 6mo Routine 10/1/2018 11/28/2018 5/2/2018    Lipid panel reflex to direct LDL Fasting Routine 10/1/2018 11/28/2018 5/2/2018    **Comprehensive metabolic panel FUTURE 6mo Routine 10/1/2018 11/28/2018 5/2/2018    **CBC with platelets FUTURE 6mo Routine 10/1/2018 11/28/2018 5/2/2018            Who to contact     If you have questions or need follow up information about today's clinic visit or your schedule please contact [facility] directly at [phone].  Normal or non-critical lab and imaging results will be communicated to you by MyChart, letter or phone within 4 business days after the clinic has received the results. If you do not hear from us within 7 days, please contact the  "clinic through [employer] or phone. If you have a critical or abnormal lab result, we will notify you by phone as soon as possible.  Submit refill requests through [email or web address] or call your pharmacy and they will forward the refill request to us. Please allow 3 business days for your refill to be completed.          Additional Information About Your Visit        [employer] Information     [email or web address] lets you send messages to your doctor, view your test results, renew your prescriptions, schedule appointments and more. To sign up, go to [email or web address] . Click on \"Log in\" on the left side of the screen, which will take you to the Welcome page. Then click on \"Sign up Now\" on the right side of the page.     You will be asked to enter the access code listed below, as well as some personal information. Please follow the directions to create your username and password.     Your access code is: [email or web address]  Expires: 2018  9:21 AM     Your access code will  in 90 days. If you need help or a new code, please call your [place] clinic or [phone].        Care EveryWhere ID     This is your Care EveryWhere ID. This could be used by other organizations to access your [place] medical records  [id number]        Your Vitals Were     Pulse Temperature Pulse Oximetry BMI (Body Mass Index)          64 97.6  F (36.4  C) (Oral) 94% 27.77 kg/m2         Blood Pressure from Last 3 Encounters:   18 110/84   12/15/17 126/82   17 (!) 138/91    Weight from Last 3 Encounters:   18 228 lb 1.6 oz (103.5 kg)   12/15/17 225 lb 14.4 oz (102.5 kg)   17 229 lb (103.9 kg)              We Performed the Following     TSH with free T4 reflex        Primary Care Provider Office Phone # Fax #    Bhaskar Gandara [phone]       [address] [facility] [address]        Equal Access to Services     IDA HYDE AH: Tanya Bueno, karlie narayanan, jalil joyce " estela goodendanny la'aan ah. So [facility] [phone].    ATENCIÓN: Si russell adame, tiene a ellis disposición servicios gratuitos de asistencia lingüística. Alethea alvarado [phone].    We comply with applicable federal civil rights laws and [place] laws. We do not discriminate on the basis of race, color, national origin, age, disability, sex, sexual orientation, or gender identity.            Thank you!     Thank you for choosing [facility]  for your care. Our goal is always to provide you with excellent care. Hearing back from our patients is one way we can continue to improve our services. Please take a few minutes to complete the written survey that you may receive in the mail after your visit with us. Thank you!             Your Updated Medication List - Protect others around you: Learn how to safely use, store and throw away your medicines at www.disposemymeds.org.          This list is accurate as of 5/2/18  9:22 AM.  Always use your most recent med list.                   Brand Name Dispense Instructions for use Diagnosis    cetirizine 10 MG tablet    zyrTEC    90 tablet    TAKE 1 TABLET(10 MG) BY MOUTH DAILY AS NEEDED FOR ALLERGIES    Seasonal allergic rhinitis       escitalopram 20 MG tablet    LEXAPRO     Take 1 tablet by mouth At Bedtime.    Anxiety, Autism       levothyroxine 125 MCG tablet    SYNTHROID/LEVOTHROID    90 tablet    Take 1 tablet (125 mcg) by mouth daily    Autoimmune hypothyroidism       metoclopramide 10 MG tablet    REGLAN    10 tablet    Take 1 tablet (10 mg) by mouth every 8 hours as needed (Nausea or Vomiting)        omeprazole 20 MG CR capsule    priLOSEC    90 capsule    TAKE 1 CAPSULE(20 MG) BY MOUTH DAILY    Cyclical vomiting with nausea       ondansetron 4 MG ODT tab    ZOFRAN ODT    20 tablet    Take 1-2 tablets (4-8 mg) by mouth every 8 hours as needed for nausea    Intractable cyclical vomiting with nausea       * OXcarbazepine 300 MG tablet     TRILEPTAL     Take 300 mg by mouth At Bedtime        * OXcarbazepine 300 MG tablet    TRILEPTAL     Take 600 mg by mouth daily        * SEROQUEL 50 MG tablet   Generic drug:  QUEtiapine      Take 50 mg by mouth 2 times daily 6pm and HS    Autism, Anxiety       * SEROQUEL 25 MG tablet   Generic drug:  QUEtiapine      Take 25 mg by mouth daily At 3pm    Autism, Anxiety       * Notice:  This list has 4 medication(s) that are the same as other medications prescribed for you. Read the directions carefully, and ask your doctor or other care provider to review them with you.

## 2018-05-02 NOTE — PROGRESS NOTES
SUBJECTIVE:   Ravi Dunne is a 27 year old male who presents to clinic today for the following health issues:      Fever      Duration: lasted a day or so, occued 2 days ago    Description (location/character/radiation): felt feverish. He was at the park during the day, got some sun. He was warm that night and did not feel well.    Intensity:  mild    Accompanying signs and symptoms: no vomiting,     History (similar episodes/previous evaluation): None    Precipitating or alleviating factors: was in the sun for 1.5 hours    Therapies tried and outcome: rest and fluids with relief after a day or so            Problem list and histories reviewed & adjusted, as indicated.  Additional history: as documented        Reviewed and updated as needed this visit by clinical staff       Reviewed and updated as needed this visit by Provider           Past Medical History:  ---------------------------  Past Medical History:   Diagnosis Date     Anxiety      Autism      Autoimmune hypothyroidism 02/14/2017    positive anti-thyroid antibodies     Cyclic vomiting syndrome      Development delay      Hyperlipidemia 2008    elevated triglycerides [phone]       Past Surgical History:  ---------------------------  Past Surgical History:   Procedure Laterality Date     NO HISTORY OF SURGERY         Current Medications:  ---------------------------  Current Outpatient Prescriptions   Medication Sig Dispense Refill     cetirizine (ZYRTEC) 10 MG tablet TAKE 1 TABLET(10 MG) BY MOUTH DAILY AS NEEDED FOR ALLERGIES 90 tablet 2     escitalopram (LEXAPRO) 20 MG tablet Take 1 tablet by mouth At Bedtime.       levothyroxine (SYNTHROID/LEVOTHROID) 125 MCG tablet Take 1 tablet (125 mcg) by mouth daily 90 tablet 3     metoclopramide (REGLAN) 10 MG tablet Take 1 tablet (10 mg) by mouth every 8 hours as needed (Nausea or Vomiting) (Patient not taking: Reported on 5/2/2018) 10 tablet 0     omeprazole (PRILOSEC) 20 MG CR capsule TAKE 1 CAPSULE(20  MG) BY MOUTH DAILY 90 capsule 2     ondansetron (ZOFRAN ODT) 4 MG ODT tab Take 1-2 tablets (4-8 mg) by mouth every 8 hours as needed for nausea 20 tablet 1     oxcarbazepine (TRILEPTAL) 300 MG tablet Take 600 mg by mouth daily        OXcarbazepine (TRILEPTAL) 300 MG tablet Take 300 mg by mouth At Bedtime        QUEtiapine (SEROQUEL) 25 MG tablet Take 25 mg by mouth daily At 3pm       QUEtiapine (SEROQUEL) 50 MG tablet Take 50 mg by mouth 2 times daily 6pm and HS         Allergies:  -------------  Allergies   Allergen Reactions     No Clinical Screening - See Comments      rylee       Social History:  -------------------  Social History     Social History     Marital status: Single     Spouse name: N/A     Number of children: N/A     Years of education: N/A     Occupational History     Not on file.     Social History Main Topics     Smoking status: Never Smoker     Smokeless tobacco: Never Used     Alcohol use No     Drug use: No     Sexual activity: No     Other Topics Concern     Not on file     Social History Narrative       Family Medical History:  ------------------------------  Family History   Problem Relation Age of Onset     Alcoholism Father      CANCER Father 50     throat cancer from smoking; in remission with chemo and surgery     Autism Spectrum Disorder Brother          ROS:  CV: NEGATIVE for chest pain, palpitations or peripheral edema  GI: NEGATIVE for nausea, abdominal pain, heartburn, or change in bowel habits  : NEGATIVE for frequency, dysuria, or hematuria  C: NEGATIVE for fever, chills, change in weight  I: NEGATIVE for worrisome rashes, moles or lesions  E: NEGATIVE for vision changes or irritation  E/M: NEGATIVE for ear, mouth and throat problems  R: NEGATIVE for significant cough or SOB  B: NEGATIVE for masses, tenderness or discharge  M: NEGATIVE for significant arthralgias or myalgia  N: NEGATIVE for weakness, dizziness or paresthesias  E: NEGATIVE for temperature intolerance, skin/hair  changes  H: NEGATIVE for bleeding problems  P: NEGATIVE for changes in mood or affect    OBJECTIVE:                                                    /84  Pulse 64  Temp 97.6  F (36.4  C) (Oral)  Wt 228 lb 1.6 oz (103.5 kg)  SpO2 94%  BMI 27.77 kg/m2     GENERAL alert and no distress  EYES:  Normal sclera,conjunctiva, EOMI  HENT: oral and posterior pharynx without lesions or erythema, facies symmetric  NECK: Neck supple. No LAD, without thyroidmegaly or JVD., Carotids without bruits.  RESP: Clear to ausculation bilaterally without wheezes or crackles. Normal BS in all fields.  CV: RRR normal S1S2 without murmurs, rubs or gallops. PMI normal  LYMPH: no cervical lymph adenopathy appreciated  MS: extremities- no gross deformities of the visible extremities noted, no edema  PSYCH: Alert and oriented times 3; speech- coherent  SKIN:  No obvious significant skin lesions on visible portions of face          ASSESSMENT/PLAN:                                                      (R50.9) Fever, unspecified fever cause  (primary encounter diagnosis)  Comment: unclear cause, was very brief, no further symptoms.   bening exam today  Plan:     (E03.9) Autoimmune hypothyroidism  Comment: recheck labs.   Plan: **TSH with free T4 reflex FUTURE 6mo            (E78.5) Hyperlipidemia LDL goal <160  Comment: Discussed current lipid results, previous results (if available) current guidelines (NCEP) for treatment and goals for lipids.  Discussed lifestyle modification, dietary changes (low fat, low simple carb) and regular aerobic exercise.  Discussed the link between dysmetabolic syndrome and impaired glucose tolerance seen in certain patterns of lipids.  Briefly discussed medication used for lipid lowering, including the statins are their possible side effects of myalgias, rhabdomyolysis, and liver toxicity.   Plan: Lipid panel reflex to direct LDL Fasting,         **Comprehensive metabolic panel FUTURE 6mo,         **CBC  with platelets FUTURE 6mo             *  Return to see me in 6 months, sooner if needed.  Please get fasting labs done at the [facility] or any other [facility] Lab lab 1-2 days before this appointment.  If you get the labs done at another clinic, make arrangements with them directly.  The orders will be in place.  Eat nothing for at least 8 hours prior to having these labs drawn.  Call [phone] to schedule appointments at [facility].     *  Continue all medications at the same doses.  Contact your usual pharmacy if you need refills.     *  No evidence for any infecitous issues at this time.       See Patient Instructions    DENICE BROWER M.D., MD  [facility]

## 2018-05-02 NOTE — LETTER
[facility]  [address] [address]  [phone]        February 9, 2019    Ravi Dunne  [address]  [facility] [address]              Dear Ravi Dunne    This is to remind you that your fasting labs is due.    You may call our office at [phone] to schedule an appointment.    Please disregard this notice if you have already had your labs drawn or made an appointment.        Sincerely,        Bhaskar Gandara MD

## 2018-05-02 NOTE — LETTER
5/2/2018      Ravi Dunne  [address]  [facility] [address]        Dear Mr. Ravi Dunne:    I am writing to inform you of the results of the laboratory tests you had done recently.    Thyroid function: NORMAL    Your thyroid level are stable.  Continue all medications at the same doses.  Contact your usual pharmacy if you need refills.     Thank you for allowing me to participate in your care. If you have any further questions or problems, please contact me via our nurse line at [phone]    Sincerely,          Bhaskar Gandara M.D.  Department of Internal Medicine  [facility]

## 2018-11-30 DIAGNOSIS — R11.15 CYCLICAL VOMITING WITH NAUSEA: ICD-10-CM

## 2018-11-30 NOTE — TELEPHONE ENCOUNTER
"Requested Prescriptions   Pending Prescriptions Disp Refills     omeprazole (PRILOSEC) 20 MG DR capsule [Pharmacy Med Name: OMEPRAZOLE 20MG CAPSULES] 90 capsule 0    Last Written Prescription Date:  2/27/18  Last Fill Quantity: 90,  # refills: 2   Last office visit: 5/2/2018 with prescribing provider:  5/2/18   Future Office Visit:     Sig: TAKE 1 CAPSULE(20 MG) BY MOUTH DAILY    PPI Protocol Passed    11/30/2018  4:05 PM       Passed - Not on Clopidogrel (unless Pantoprazole ordered)       Passed - No diagnosis of osteoporosis on record       Passed - Recent (12 mo) or future (30 days) visit within the authorizing provider's specialty    Patient had office visit in the last 12 months or has a visit in the next 30 days with authorizing provider or within the authorizing provider's specialty.  See \"Patient Info\" tab in inbasket, or \"Choose Columns\" in Meds & Orders section of the refill encounter.             Passed - Patient is age 18 or older          "

## 2018-12-03 ENCOUNTER — TELEPHONE (OUTPATIENT)
Dept: INTERNAL MEDICINE | Facility: CLINIC | Age: 28
End: 2018-12-03

## 2018-12-03 NOTE — TELEPHONE ENCOUNTER
Prior Authorization Retail Medication Request    Medication/Dose: omeprazole 20mg capsules  ICD code (if different than what is on RX):  G43.A0  Previously Tried and Failed:    Rationale:      Insurance Name:  Medicaid [id number]  Insurance ID:  [id number]      Pharmacy Information (if different than what is on RX)  Name:  [facility] [address]   Phone:  [phone]

## 2018-12-03 NOTE — TELEPHONE ENCOUNTER
Left message for patient.  He can either purchase medication OTC or call insurance company to find out what similar medications are covered.

## 2018-12-05 DIAGNOSIS — E06.3 AUTOIMMUNE HYPOTHYROIDISM: ICD-10-CM

## 2018-12-06 RX ORDER — LEVOTHYROXINE SODIUM 125 UG/1
TABLET ORAL
Qty: 90 TABLET | Refills: 1 | Status: SHIPPED | OUTPATIENT
Start: 2018-12-06 | End: 2019-04-03

## 2018-12-06 NOTE — TELEPHONE ENCOUNTER
"Requested Prescriptions   Pending Prescriptions Disp Refills     levothyroxine (SYNTHROID/LEVOTHROID) 125 MCG tablet [Pharmacy Med Name: LEVOTHYROXINE 0.125MG (125MCG) TAB] 90 tablet 0    Last Written Prescription Date:  11/15/17  Last Fill Quantity: 90,  # refills: 3   Last office visit: 5/2/2018 with prescribing provider:  5/2/18   Future Office Visit:     Sig: TAKE 1 TABLET(125 MCG) BY MOUTH DAILY    Thyroid Protocol Passed    12/5/2018  6:38 PM       Passed - Patient is 12 years or older       Passed - Recent (12 mo) or future (30 days) visit within the authorizing provider's specialty    Patient had office visit in the last 12 months or has a visit in the next 30 days with authorizing provider or within the authorizing provider's specialty.  See \"Patient Info\" tab in inbasket, or \"Choose Columns\" in Meds & Orders section of the refill encounter.             Passed - Normal TSH on file in past 12 months    Recent Labs   Lab Test  05/02/18   0928   TSH  0.80                "

## 2019-01-12 DIAGNOSIS — J30.2 SEASONAL ALLERGIC RHINITIS: ICD-10-CM

## 2019-01-12 NOTE — TELEPHONE ENCOUNTER
"Requested Prescriptions   Pending Prescriptions Disp Refills     ALL DAY ALLERGY 10 MG tablet [Pharmacy Med Name: ALL DAY ALLERGY 10MG TABLETS] 90 tablet 0    Last Written Prescription Date:  4/3/2018  Last Fill Quantity: 90,  # refills: 2   Last office visit: 5/2/2018 with prescribing provider:  5/2/2018   Future Office Visit:     Sig: TAKE 1 TABLET(10 MG) BY MOUTH DAILY AS NEEDED FOR ALLERGIES    Antihistamines Protocol Passed - 1/12/2019  9:06 AM       Passed - Patient is 3-64 years of age    Apply weight-based dosing for peds patients age 3 - 12 years of age.    Forward request to provider for patients under the age of 3 or over the age of 64.         Passed - Recent (12 mo) or future (30 days) visit within the authorizing provider's specialty    Patient had office visit in the last 12 months or has a visit in the next 30 days with authorizing provider or within the authorizing provider's specialty.  See \"Patient Info\" tab in inbasket, or \"Choose Columns\" in Meds & Orders section of the refill encounter.             Passed - Medication is active on med list          "

## 2019-01-14 RX ORDER — CETIRIZINE HYDROCHLORIDE 10 MG/1
TABLET, FILM COATED ORAL
Qty: 90 TABLET | Refills: 0 | Status: SHIPPED | OUTPATIENT
Start: 2019-01-14 | End: 2020-05-13

## 2019-03-07 ENCOUNTER — MEDICAL CORRESPONDENCE (OUTPATIENT)
Dept: HEALTH INFORMATION MANAGEMENT | Facility: CLINIC | Age: 29
End: 2019-03-07

## 2019-03-14 ENCOUNTER — TELEPHONE (OUTPATIENT)
Dept: INTERNAL MEDICINE | Facility: CLINIC | Age: 29
End: 2019-03-14

## 2019-03-24 ENCOUNTER — HOSPITAL ENCOUNTER (EMERGENCY)
Facility: CLINIC | Age: 29
Discharge: HOME | End: 2019-03-25
Attending: EMERGENCY MEDICINE | Admitting: EMERGENCY MEDICINE
Payer: MEDICAID

## 2019-03-24 DIAGNOSIS — G43.A0 CYCLICAL VOMITING WITH NAUSEA, INTRACTABILITY OF VOMITING NOT SPECIFIED: ICD-10-CM

## 2019-03-24 PROCEDURE — 99285 EMERGENCY DEPT VISIT HI MDM: CPT | Mod: 25

## 2019-03-24 RX ORDER — ONDANSETRON 2 MG/ML
4 INJECTION INTRAMUSCULAR; INTRAVENOUS EVERY 30 MIN PRN
Status: DISCONTINUED | OUTPATIENT
Start: 2019-03-24 | End: 2019-03-25 | Stop reason: HOSPADM

## 2019-03-24 ASSESSMENT — ENCOUNTER SYMPTOMS
VOMITING: 1
ABDOMINAL PAIN: 1
FATIGUE: 1
FEVER: 0
NAUSEA: 1

## 2019-03-24 ASSESSMENT — MIFFLIN-ST. JEOR: SCORE: 2102.51

## 2019-03-24 NOTE — ED AVS SNAPSHOT
Emergency Department  [address] [place] [id number]  Phone:  [phone]  Fax:  [phone]                                    Ravi Dunne   MRN: [id number]    Department:   Emergency Department   Date of Visit:  3/24/2019           After Visit Summary Signature Page    I have received my discharge instructions, and my questions have been answered. I have discussed any challenges I see with this plan with the nurse or doctor.    ..........................................................................................................................................  Patient/Patient Representative Signature      ..........................................................................................................................................  Patient Representative Print Name and Relationship to Patient    ..................................................               ................................................  Date                                   Time    ..........................................................................................................................................  Reviewed by Signature/Title    ...................................................              ..............................................  Date                                               Time          22EPIC Rev 08/18

## 2019-03-25 VITALS
HEIGHT: 77 IN | BODY MASS INDEX: 26.68 KG/M2 | OXYGEN SATURATION: 94 % | HEART RATE: 92 BPM | DIASTOLIC BLOOD PRESSURE: 92 MMHG | WEIGHT: 226 LBS | SYSTOLIC BLOOD PRESSURE: 145 MMHG | RESPIRATION RATE: 18 BRPM | TEMPERATURE: 97.6 F

## 2019-03-25 PROCEDURE — 96374 THER/PROPH/DIAG INJ IV PUSH: CPT

## 2019-03-25 PROCEDURE — 25000128 H RX IP 250 OP 636: Performed by: EMERGENCY MEDICINE

## 2019-03-25 PROCEDURE — 96376 TX/PRO/DX INJ SAME DRUG ADON: CPT

## 2019-03-25 PROCEDURE — 96375 TX/PRO/DX INJ NEW DRUG ADDON: CPT

## 2019-03-25 PROCEDURE — 96361 HYDRATE IV INFUSION ADD-ON: CPT

## 2019-03-25 RX ORDER — ONDANSETRON 4 MG/1
4 TABLET, ORALLY DISINTEGRATING ORAL EVERY 8 HOURS PRN
Qty: 10 TABLET | Refills: 0 | Status: SHIPPED | OUTPATIENT
Start: 2019-03-25 | End: 2019-03-28

## 2019-03-25 RX ORDER — LORAZEPAM 2 MG/ML
1 INJECTION INTRAMUSCULAR ONCE
Status: COMPLETED | OUTPATIENT
Start: 2019-03-25 | End: 2019-03-25

## 2019-03-25 RX ORDER — ONDANSETRON 2 MG/ML
8 INJECTION INTRAMUSCULAR; INTRAVENOUS ONCE
Status: COMPLETED | OUTPATIENT
Start: 2019-03-25 | End: 2019-03-25

## 2019-03-25 RX ADMIN — LORAZEPAM 1 MG: 2 INJECTION INTRAMUSCULAR; INTRAVENOUS at 02:14

## 2019-03-25 RX ADMIN — SODIUM CHLORIDE 1000 ML: 9 INJECTION, SOLUTION INTRAVENOUS at 00:11

## 2019-03-25 RX ADMIN — ONDANSETRON 8 MG: 2 INJECTION INTRAMUSCULAR; INTRAVENOUS at 01:33

## 2019-03-25 RX ADMIN — ONDANSETRON 4 MG: 2 INJECTION INTRAMUSCULAR; INTRAVENOUS at 00:11

## 2019-03-25 RX ADMIN — SODIUM CHLORIDE 1000 ML: 9 INJECTION, SOLUTION INTRAVENOUS at 02:16

## 2019-03-25 NOTE — ED PROVIDER NOTES
"  History     Chief Complaint:    Vomiting       HPI   Ravi Dunne is a 28 year old male with a history of cyclic vomiting who presents to the ED with his mother for evaluation of vomiting. The patient's mother states that she has had constant nausea and vomiting throughout the day today consistent with his numerous previous episodes of cyclic vomiting. The mother also notes that felt diaphoretic and the patient complains of mild abdominal pain. He also notes that he feels dehydrated and lethargic. His mother reports that he usually improved with IV fluids and Zofran; she notes that he did not have a good experience with compazine and benadryl in the past for nausea control. The mother denies any fevers.     Allergies:  The patient has no known drug allergies.     Medications:    Lexapro  Synthroid  Reglan  Zofran  Prilosec  Trileptal  Seroquel     Past Medical History:    Anxiety  Autism  Autoimmune hypothyroidism  Cyclic vomiting syndrome  Hyperlipidemia    Past Surgical History:    The patient does not have any pertinent past surgical history.    Family History:    Throat cancer  Autism  Alcoholism  Cancer    Social History:  Negative for tobacco use.  Negative for alcohol use.  Presents with his mother.   Marital Status:  Single [1]     Review of Systems   Constitutional: Positive for fatigue. Negative for fever.   Gastrointestinal: Positive for abdominal pain, nausea and vomiting.   All other systems reviewed and are negative.      Physical Exam   First Vitals:  BP: 178/83  Pulse: 89  Temp: 97.6  F (36.4  C)  Resp: 18  Height: 195.6 cm (6' 5\")  Weight: 102.5 kg (226 lb)  SpO2: 96 %      Physical Exam  Vitals: reviewed by me  General: Pt seen on [facility], cooperative, and alert to conversation  Eyes: Tracking well, clear conjunctiva BL  ENT: MMM, midline trachea.   Lungs:  No tachypnea, no accessory muscle use. No respiratory distress.   CV: Rate as above, regular rhythm.    Abd: Soft, non " tender, no guarding, no rebound. Non distended  MSK: no peripheral edema or joint effusion.  No evidence of trauma  Skin: No rash, normal turgor and temperature  Neuro: Clear speech and no facial droop.  Psych: Not RIS, no e/o AH/VH      Emergency Department Course   Interventions:  0011 NS 1,000 mLs IV   Zofran 4 mg IV  0133 Zofran 8 mg IV  0214 Ativan 1 mg IV  0216 NS 1,000 mLs IV    Emergency Department Course:  Nursing notes and vitals reviewed. (4044) I performed an exam of the patient as documented above.     IV inserted. Medicine administered as documented above.      I rechecked the patient and discussed the results of his workup thus far.     Findings and plan explained to the Patient and mother. Patient discharged home with instructions regarding supportive care, medications, and reasons to return. The importance of close follow-up was reviewed. The patient was prescribed Zofran.    Impression & Plan    Medical Decision Making:  Patient is a 28 year old male who presents to the ED with what appears to be cyclic vomiting syndrome. Unfortunately, this appear to be a life long problem for the patient, although he has done well here with Zofran and Ativan. He has a benign abdomen at time of arrival and time of discharge and is tolerating orals at the end. He is asking to go home, mother is okay with this plan, he will be discharged with Zofran. No indication for CT scan or labs, he is otherwise doing well, normal vital signs, and acting and mentating appropriately per mother now that he has received some IV fluids. Since he certainly has perked up, his symptoms do fit with dehydration. Will discharge him with Zofran and PCP follow up in the next few days. Mother is okay with this plan.       Diagnosis:    ICD-10-CM    1. Cyclical vomiting with nausea, intractability of vomiting not specified G43.A0        Disposition:  discharged to home with mother    Discharge Medications:     Medication List      Modified     * ondansetron 4 MG ODT tab  Commonly known as:  ZOFRAN ODT  4-8 mg, Oral, EVERY 8 HOURS PRN  What changed:  Another medication with the same name was added. Make sure you understand how and when to take each.       Scribe Disclosure:  I,  Grant Calderon, am serving as a scribe on 3/24/2019 at 11:48 PM to personally document services performed by Ritesh Morales* based on my observations and the provider's statements to me.          Grant Calderon  3/24/2019    EMERGENCY DEPARTMENT       Ritesh Morales MD  03/25/19 0518

## 2019-04-03 ENCOUNTER — OFFICE VISIT (OUTPATIENT)
Dept: INTERNAL MEDICINE | Facility: CLINIC | Age: 29
End: 2019-04-03
Payer: MEDICAID

## 2019-04-03 VITALS
HEART RATE: 60 BPM | SYSTOLIC BLOOD PRESSURE: 124 MMHG | OXYGEN SATURATION: 99 % | WEIGHT: 226 LBS | BODY MASS INDEX: 26.8 KG/M2 | DIASTOLIC BLOOD PRESSURE: 82 MMHG | TEMPERATURE: 97.8 F

## 2019-04-03 DIAGNOSIS — F41.9 ANXIETY: ICD-10-CM

## 2019-04-03 DIAGNOSIS — Z11.4 SCREENING FOR HIV (HUMAN IMMUNODEFICIENCY VIRUS): ICD-10-CM

## 2019-04-03 DIAGNOSIS — E06.3 AUTOIMMUNE HYPOTHYROIDISM: Primary | ICD-10-CM

## 2019-04-03 DIAGNOSIS — J30.2 SEASONAL ALLERGIC RHINITIS, UNSPECIFIED TRIGGER: ICD-10-CM

## 2019-04-03 DIAGNOSIS — Z13.6 CARDIOVASCULAR SCREENING; LDL GOAL LESS THAN 160: ICD-10-CM

## 2019-04-03 DIAGNOSIS — R11.15 INTRACTABLE CYCLICAL VOMITING WITH NAUSEA: ICD-10-CM

## 2019-04-03 DIAGNOSIS — F84.0 AUTISM: ICD-10-CM

## 2019-04-03 DIAGNOSIS — Z79.899 ENCOUNTER FOR LONG-TERM (CURRENT) USE OF HIGH-RISK MEDICATION: ICD-10-CM

## 2019-04-03 DIAGNOSIS — Z23 NEED FOR PROPHYLACTIC VACCINATION AND INOCULATION AGAINST INFLUENZA: ICD-10-CM

## 2019-04-03 LAB
ALBUMIN SERPL-MCNC: 4.4 G/DL (ref 3.4–5)
ALP SERPL-CCNC: 88 U/L (ref 40–150)
ALT SERPL W P-5'-P-CCNC: 42 U/L (ref 0–70)
ANION GAP SERPL CALCULATED.3IONS-SCNC: 6 MMOL/L (ref 3–14)
AST SERPL W P-5'-P-CCNC: 22 U/L (ref 0–45)
BILIRUB SERPL-MCNC: 0.6 MG/DL (ref 0.2–1.3)
BUN SERPL-MCNC: 11 MG/DL (ref 7–30)
CALCIUM SERPL-MCNC: 9.4 MG/DL (ref 8.5–10.1)
CHLORIDE SERPL-SCNC: 106 MMOL/L (ref 94–109)
CHOLEST SERPL-MCNC: 189 MG/DL
CO2 SERPL-SCNC: 27 MMOL/L (ref 20–32)
CREAT SERPL-MCNC: 1.03 MG/DL (ref 0.66–1.25)
ERYTHROCYTE [DISTWIDTH] IN BLOOD BY AUTOMATED COUNT: 12.9 % (ref 10–15)
GFR SERPL CREATININE-BSD FRML MDRD: >90 ML/MIN/{1.73_M2}
GLUCOSE SERPL-MCNC: 88 MG/DL (ref 70–99)
HBA1C MFR BLD: 5.1 % (ref 0–5.6)
HCT VFR BLD AUTO: 44.6 % (ref 40–53)
HDLC SERPL-MCNC: 39 MG/DL
HGB BLD-MCNC: 15.6 G/DL (ref 13.3–17.7)
HIV 1+2 AB+HIV1 P24 AG SERPL QL IA: NONREACTIVE
LDLC SERPL CALC-MCNC: 122 MG/DL
MCH RBC QN AUTO: 29.4 PG (ref 26.5–33)
MCHC RBC AUTO-ENTMCNC: 35 G/DL (ref 31.5–36.5)
MCV RBC AUTO: 84 FL (ref 78–100)
NONHDLC SERPL-MCNC: 150 MG/DL
PLATELET # BLD AUTO: 203 10E9/L (ref 150–450)
POTASSIUM SERPL-SCNC: 3.6 MMOL/L (ref 3.4–5.3)
PROT SERPL-MCNC: 8.1 G/DL (ref 6.8–8.8)
RBC # BLD AUTO: 5.31 10E12/L (ref 4.4–5.9)
SODIUM SERPL-SCNC: 139 MMOL/L (ref 133–144)
T4 FREE SERPL-MCNC: 0.83 NG/DL (ref 0.76–1.46)
TRIGL SERPL-MCNC: 140 MG/DL
TSH SERPL DL<=0.005 MIU/L-ACNC: 0.38 MU/L (ref 0.4–4)
WBC # BLD AUTO: 5.4 10E9/L (ref 4–11)

## 2019-04-03 PROCEDURE — 85027 COMPLETE CBC AUTOMATED: CPT | Performed by: INTERNAL MEDICINE

## 2019-04-03 PROCEDURE — 80061 LIPID PANEL: CPT | Performed by: INTERNAL MEDICINE

## 2019-04-03 PROCEDURE — 83036 HEMOGLOBIN GLYCOSYLATED A1C: CPT | Performed by: INTERNAL MEDICINE

## 2019-04-03 PROCEDURE — 99214 OFFICE O/P EST MOD 30 MIN: CPT | Mod: 25 | Performed by: INTERNAL MEDICINE

## 2019-04-03 PROCEDURE — 84443 ASSAY THYROID STIM HORMONE: CPT | Performed by: INTERNAL MEDICINE

## 2019-04-03 PROCEDURE — 90471 IMMUNIZATION ADMIN: CPT | Performed by: INTERNAL MEDICINE

## 2019-04-03 PROCEDURE — 84439 ASSAY OF FREE THYROXINE: CPT | Performed by: INTERNAL MEDICINE

## 2019-04-03 PROCEDURE — 36415 COLL VENOUS BLD VENIPUNCTURE: CPT | Performed by: INTERNAL MEDICINE

## 2019-04-03 PROCEDURE — 87389 HIV-1 AG W/HIV-1&-2 AB AG IA: CPT | Performed by: INTERNAL MEDICINE

## 2019-04-03 PROCEDURE — 80053 COMPREHEN METABOLIC PANEL: CPT | Performed by: INTERNAL MEDICINE

## 2019-04-03 PROCEDURE — 90686 IIV4 VACC NO PRSV 0.5 ML IM: CPT | Performed by: INTERNAL MEDICINE

## 2019-04-03 RX ORDER — QUETIAPINE FUMARATE 50 MG/1
50 TABLET, FILM COATED ORAL AT BEDTIME
COMMUNITY
Start: 2019-04-03

## 2019-04-03 RX ORDER — CETIRIZINE HYDROCHLORIDE 10 MG/1
10 TABLET ORAL DAILY
Qty: 90 TABLET | Refills: 3 | Status: SHIPPED | OUTPATIENT
Start: 2019-04-03 | End: 2020-04-09

## 2019-04-03 RX ORDER — ONDANSETRON 4 MG/1
4-8 TABLET, ORALLY DISINTEGRATING ORAL EVERY 8 HOURS PRN
Qty: 20 TABLET | Refills: 5 | Status: SHIPPED | OUTPATIENT
Start: 2019-04-03 | End: 2020-05-12

## 2019-04-03 RX ORDER — QUETIAPINE FUMARATE 25 MG/1
25 TABLET, FILM COATED ORAL 2 TIMES DAILY
COMMUNITY
Start: 2019-04-03

## 2019-04-03 RX ORDER — OXCARBAZEPINE 300 MG/1
300 TABLET, FILM COATED ORAL AT BEDTIME
COMMUNITY
Start: 2019-04-03

## 2019-04-03 RX ORDER — LEVOTHYROXINE SODIUM 125 UG/1
125 TABLET ORAL DAILY
Qty: 90 TABLET | Refills: 3 | Status: SHIPPED | OUTPATIENT
Start: 2019-04-03 | End: 2020-04-06

## 2019-04-03 RX ORDER — ESCITALOPRAM OXALATE 20 MG/1
20 TABLET ORAL AT BEDTIME
COMMUNITY
Start: 2019-04-03

## 2019-04-03 NOTE — PATIENT INSTRUCTIONS
"*  Continue all medications at the same doses.  Contact your usual pharmacy if you need refills.     *  Fasting labs drawn today, [place] [facility] [place]    *  Return to see me in 6 months, sooner if needed.  Call [phone] to schedule this appointment.         SEASONAL ALLERGIES:     *  Take one of the following over the counter non-sedating anti-histamines allergy tablet once per day, every day for the next 4-8 weeks during the duration of the allergy season.      --generic Allegra (fexofenidine)  OR  --generic Zyrtec (cetirizine)  OR  --generic Claritin (loratidine)         Benadryl (diphenhydramine) is a good anti-histmaine, but it causes drowsiness.  Insurances do not cover over the counter medications.      EXAMPLES OF OVER THE COUNTER NON-SEDATING ALLERGY MEDICATIONS:  (brand name and then generic versions)  The generic version are the exact same medication and work just as well, but just cost less.                  *  Use steroid nasal spray (available over the counter), Use 2 sprays into each nostril once per day, every day regardless of how you feel for the next 4-8 weeks, depending on the length of the allergy season.  This type of steroid nasal spray will take at least 10 days to reach full effect, please use it for at least 3 full weeks before deciding if it doesn't work for you.       --typical brands include Flonase (fluticasone) or Nasonex (mometasone) or Nasocort (triamcinolone)    Examples of steroid nasal spray:  (cheapest prices are from [employer] or [employer])             * Beware of decongestants or medications preparations that have a \"D\", these contain pseudoephedrine or phenylephrine which may raise your blood pressure. If your blood pressure is well controlled and you are not on multiple medications, then you may be able to take small amounts of decongestant without a large chance of side effects, but please monitor your blood pressure and if >140/90 while on the " decongestants, then stop those products.     *  If you cannot tolerate decongestants or have been told not to take decongestants, you can use chlortrimeton (chlorpheniramine) if you react poorly to decongestants.  Always try and use the lowest dose needed.  If you have a low of overnight or early morning allergy symptoms, then try taking you favorite nighttime multi symptom cold reliever medication (such as Nyquil, Vicks Formula 44, etc.)

## 2019-04-03 NOTE — PROGRESS NOTES
SUBJECTIVE:   Ravi Dunne is a 28 year old male who presents to clinic today for the following health issues:      ED/UC Followup:    Facility:   ED   Date of visit: 3/24/19  Reason for visit: vomiting   Current Status: Pt states to be better      Reviewed [facility] Emergency Room notes.     Right before the event, he drank almost an entire gallon of milk (he was mad at his brother).    Has had intermittent episodes of vomiting acute vomiting.  Last emergency room visit was December 2017.    In between episodes, denies abdominal pain, significant reflux, dysphagia.  Bowels remain normal once a day, formed.    1.      Long history of anxiety and autism and developmental delay.  He is followed by [facility] psychiatry service.  They have requested laboratories to be drawn and faxed to them.  Patient and his mother denies any side effects from medicines.    They both feel the medications are helping him.    2.  History of hypothyroidism.  On replacement therapy.  He has not experienced any significant side effects of this medication.   The patient denies of fatigue, weight changes, heat/cold intolerance, hair changes, nail changes, bowel changes.     Latest labs reviewed:    Lab Results   Component Value Date    TSH 0.80 05/02/2018    TSH 1.98 11/15/2017    TSH 8.48 08/11/2017    TSH 7.47 02/14/2017    TSH 1.17 06/24/2014    TSH 2.80 02/02/2010    TSH 2.52 04/23/2009          Problem list and histories reviewed & adjusted, as indicated.  Additional history: as documented        Reviewed and updated as needed this visit by clinical staff       Reviewed and updated as needed this visit by Provider           Past Medical History:  ---------------------------  Past Medical History:   Diagnosis Date     Anxiety      Autism      Autoimmune hypothyroidism 02/14/2017    positive anti-thyroid antibodies     Cyclic vomiting syndrome      Development delay      Hyperlipidemia 2008     elevated triglycerides [id number]       Past Surgical History:  ---------------------------  Past Surgical History:   Procedure Laterality Date     NO HISTORY OF SURGERY         Current Medications:  ---------------------------  Current Outpatient Medications   Medication Sig Dispense Refill     ALL DAY ALLERGY 10 MG tablet TAKE 1 TABLET(10 MG) BY MOUTH DAILY AS NEEDED FOR ALLERGIES 90 tablet 0     escitalopram (LEXAPRO) 20 MG tablet Take 1 tablet by mouth At Bedtime.       levothyroxine (SYNTHROID/LEVOTHROID) 125 MCG tablet TAKE 1 TABLET(125 MCG) BY MOUTH DAILY 90 tablet 1     metoclopramide (REGLAN) 10 MG tablet Take 1 tablet (10 mg) by mouth every 8 hours as needed (Nausea or Vomiting) 10 tablet 0     omeprazole (PRILOSEC) 20 MG DR capsule TAKE 1 CAPSULE(20 MG) BY MOUTH DAILY 90 capsule 1     ondansetron (ZOFRAN ODT) 4 MG ODT tab Take 1-2 tablets (4-8 mg) by mouth every 8 hours as needed for nausea 20 tablet 1     OXcarbazepine (TRILEPTAL) 300 MG tablet Take 300 mg by mouth At Bedtime        oxcarbazepine (TRILEPTAL) 300 MG tablet Take 900 mg by mouth At Bedtime        QUEtiapine (SEROQUEL) 25 MG tablet Take 25 mg by mouth daily At 3pm       QUEtiapine (SEROQUEL) 50 MG tablet Take 50 mg by mouth 2 times daily 6pm and HS         Allergies:  -------------  Allergies   Allergen Reactions     No Clinical Screening - See Krista mckee       Social History:  -------------------  Social History     Socioeconomic History     Marital status: Single     Spouse name: Not on file     Number of children: Not on file     Years of education: Not on file     Highest education level: Not on file   Occupational History     Not on file   Social Needs     Financial resource strain: Not on file     Food insecurity:     Worry: Not on file     Inability: Not on file     Transportation needs:     Medical: Not on file     Non-medical: Not on file   Tobacco Use     Smoking status: Never Smoker     Smokeless tobacco: Never Used    Substance and Sexual Activity     Alcohol use: No     Drug use: No     Sexual activity: No   Lifestyle     Physical activity:     Days per week: Not on file     Minutes per session: Not on file     Stress: Not on file   Relationships     Social connections:     Talks on phone: Not on file     Gets together: Not on file     Attends [employer] service: Not on file     Active member of club or organization: Not on file     Attends meetings of clubs or organizations: Not on file     Relationship status: Not on file     Intimate partner violence:     Fear of current or ex partner: Not on file     Emotionally abused: Not on file     Physically abused: Not on file     Forced sexual activity: Not on file   Other Topics Concern     Parent/sibling w/ CABG, MI or angioplasty before 65F 55M? Not Asked   Social History Narrative     Not on file       Family Medical History:  ------------------------------  Family History   Problem Relation Age of Onset     Alcoholism Father      Cancer Father 50        throat cancer from smoking; in remission with chemo and surgery     Autism Spectrum Disorder Brother          ROS:  REVIEW OF SYSTEMS:    RESP: negative for cough, dyspnea, wheezing, hemoptysis  CV: negative for chest pain, palpitations, PND, GONZALEZ, orthopnea; reports no changes in their ability to perform physical activity (from cardiovascular standpoint)  GI: negative for dysphagia, N/V, pain, melena, diarrhea and constipation  NEURO: negative for numbness/tingling, paralysis, incoordination, or focal weakness     OBJECTIVE:                                                    /82   Pulse 60   Temp 97.8  F (36.6  C) (Oral)   Wt 102.5 kg (226 lb)   SpO2 99%   BMI 26.80 kg/m       GENERAL alert and no distress  EYES:  Normal sclera,conjunctiva, EOMI  HENT: oral and posterior pharynx without lesions or erythema, facies symmetric  NECK: Neck supple. No LAD, without thyroidmegaly or JVD., Carotids without bruits.  RESP: Clear  to ausculation bilaterally without wheezes or crackles. Normal BS in all fields.  CV: RRR normal S1S2 without murmurs, rubs or gallops. PMI normal  LYMPH: no cervical lymph adenopathy appreciated  MS: extremities- no gross deformities of the visible extremities noted, no edema  PSYCH: Alert and oriented times 3; speech- coherent  SKIN:  No obvious significant skin lesions on visible portions of face          ASSESSMENT/PLAN:                                                      (E06.3) Autoimmune hypothyroidism  (primary encounter diagnosis)  Comment: This condition is currently controlled on the current medical regimen.  Continue current therapy.   Discussed signs and symptoms of hypo and hyperthyroidism.  Reviewed treatment options.   Recommended absolute medication compliance to avoid adding any additionial variance to the labs.   Plan: TSH WITH FREE T4 REFLEX, levothyroxine         (SYNTHROID/LEVOTHROID) 125 MCG tablet, T4 free            (F84.0) Autism  Comment: manged by psychiatry follow-up, check laboratories as requested by psychiatrist.  Plan: QUEtiapine (SEROQUEL) 25 MG tablet, QUEtiapine         (SEROQUEL) 50 MG tablet, OXcarbazepine         (TRILEPTAL) 300 MG tablet, escitalopram         (LEXAPRO) 20 MG tablet, CBC with platelets,         Comprehensive metabolic panel, Lipid panel         reflex to direct LDL Fasting, Hemoglobin A1c            (F41.9) Anxiety  Comment: This condition is currently controlled on the current medical regimen.  Continue current therapy.   Managed by psychiatry  Plan: QUEtiapine (SEROQUEL) 25 MG tablet, QUEtiapine         (SEROQUEL) 50 MG tablet, OXcarbazepine         (TRILEPTAL) 300 MG tablet, escitalopram         (LEXAPRO) 20 MG tablet            (G43.A1) Intractable cyclical vomiting with nausea  Comment: First episode over a year.  His most recent episode probably triggered by drinking 1 gallon of milk shortly before the vomiting started.  Treat as needed with  Zofran.  Plan: ondansetron (ZOFRAN ODT) 4 MG ODT tab,         omeprazole (PRILOSEC) 20 MG DR capsule            (Z11.4) Screening for HIV (human immunodeficiency virus)  Comment:   Plan: HIV Screening            (Z13.6) CARDIOVASCULAR SCREENING; LDL GOAL LESS THAN 160  Comment: Discussed cardiac disease risk factors and cardiac disease risk factor modification.   Plan:     (Z79.899) Encounter for long-term (current) use of high-risk medication  Comment: Checking labs as requested by psychiatry  Plan: TSH WITH FREE T4 REFLEX, CBC with platelets,         Comprehensive metabolic panel, Lipid panel         reflex to direct LDL Fasting, Hemoglobin A1c            (J30.2) Seasonal allergic rhinitis, unspecified trigger  Comment:   Plan: cetirizine (ZYRTEC) 10 MG tablet            (Z23) Need for prophylactic vaccination and inoculation against influenza  Comment:   Plan: FLU VACCINE, SPLIT VIRUS, IM (QUADRIVALENT)         [07786]- >3 YRS, Vaccine Administration,         Initial [76493]               See Patient Instructions    DENICE BROWER M.D., MD  [facility]    (Chart documentation may have been completed, in part, with [employer] voice-recognition software. Even though reviewed, some grammatical, spelling, and word errors may remain.)     I spent greater than 25 minutes with pt, greater than 50% of time was educational and counseling.

## 2019-04-04 ENCOUNTER — DOCUMENTATION ONLY (OUTPATIENT)
Dept: INTERNAL MEDICINE | Facility: CLINIC | Age: 29
End: 2019-04-04

## 2020-04-06 DIAGNOSIS — R11.15 INTRACTABLE CYCLICAL VOMITING WITH NAUSEA: ICD-10-CM

## 2020-04-06 DIAGNOSIS — E06.3 AUTOIMMUNE HYPOTHYROIDISM: ICD-10-CM

## 2020-04-06 RX ORDER — LEVOTHYROXINE SODIUM 125 UG/1
125 TABLET ORAL DAILY
Qty: 90 TABLET | Refills: 0 | Status: SHIPPED | OUTPATIENT
Start: 2020-04-06 | End: 2020-07-06

## 2020-04-06 NOTE — TELEPHONE ENCOUNTER
Requested Prescriptions   Pending Prescriptions Disp Refills     omeprazole (PRILOSEC) 20 MG DR capsule 90 capsule 3     Sig: Take 1 capsule (20 mg) by mouth daily  Last Written Prescription Date:  04/03/19  Last Fill Quantity: 90,  # refills: 3   Last office visit: 4/3/2019 with prescribing provider:  04/03/19   Future Office Visit:  0       There is no refill protocol information for this order        levothyroxine (SYNTHROID/LEVOTHROID) 125 MCG tablet 90 tablet 3     Sig: Take 1 tablet (125 mcg) by mouth daily  Last Written Prescription Date:  04/03/19  Last Fill Quantity: 90,  # refills: 3   Last office visit: 4/3/2019 with prescribing provider:  04/03/19   Future Office Visit:  0       There is no refill protocol information for this order

## 2020-04-06 NOTE — TELEPHONE ENCOUNTER
Prescription(s) sent electronically to specified pharmacy.    OK to postpone routine follow up visits and labs for 1-3 months due to need for social distancing (possibly longer depending on the recommendations at that time)  Future orders adjusted.     Contact us sooner for any more acute issues.   We can send additional refills to cover this time period, let us know if any refills are needed.

## 2020-04-09 DIAGNOSIS — J30.2 SEASONAL ALLERGIC RHINITIS, UNSPECIFIED TRIGGER: ICD-10-CM

## 2020-04-09 RX ORDER — CETIRIZINE HYDROCHLORIDE 10 MG/1
10 TABLET ORAL DAILY
Qty: 90 TABLET | Refills: 0 | Status: SHIPPED | OUTPATIENT
Start: 2020-04-09 | End: 2020-07-16

## 2020-04-09 NOTE — TELEPHONE ENCOUNTER
Requested Prescriptions   Pending Prescriptions Disp Refills     cetirizine (ZYRTEC) 10 MG tablet 90 tablet 3     Sig: Take 1 tablet (10 mg) by mouth daily       There is no refill protocol information for this order      Last Written Prescription Date:  04/03/19  Last Fill Quantity: 90,  # refills: 3   Last office visit: 4/3/2019 with prescribing provider:  04/03/19   Future Office Visit:  0

## 2020-04-09 NOTE — TELEPHONE ENCOUNTER
"Requested Prescriptions   Pending Prescriptions Disp Refills     cetirizine (ZYRTEC) 10 MG tablet 90 tablet 3     Sig: Take 1 tablet (10 mg) by mouth daily       Antihistamines Protocol Failed - 4/9/2020  1:21 PM        Failed - Recent (12 mo) or future (30 days) visit within the authorizing provider's specialty     Patient has had an office visit with the authorizing provider or a provider within the authorizing providers department within the previous 12 mos or has a future within next 30 days. See \"Patient Info\" tab in inbasket, or \"Choose Columns\" in Meds & Orders section of the refill encounter.              Passed - Patient is 3-64 years of age     Apply weight-based dosing for peds patients age 3 - 12 years of age.    Forward request to provider for patients under the age of 3 or over the age of 64.          Passed - Medication is active on med list             Medication is being filled for 1 time refill only due to:  Patient needs to be seen because it has been more than one year since last visit.     Prescription approved per [facility] according to the [facility] Refill Protocol.    Monica SAHAN, RN, PHN      "

## 2020-05-12 ENCOUNTER — APPOINTMENT (OUTPATIENT)
Dept: CT IMAGING | Facility: CLINIC | Age: 30
End: 2020-05-12
Attending: EMERGENCY MEDICINE
Payer: MEDICAID

## 2020-05-12 ENCOUNTER — HOSPITAL ENCOUNTER (EMERGENCY)
Facility: CLINIC | Age: 30
Discharge: HOME | End: 2020-05-12
Attending: EMERGENCY MEDICINE | Admitting: EMERGENCY MEDICINE
Payer: MEDICAID

## 2020-05-12 VITALS
HEIGHT: 77 IN | SYSTOLIC BLOOD PRESSURE: 111 MMHG | DIASTOLIC BLOOD PRESSURE: 62 MMHG | BODY MASS INDEX: 25.98 KG/M2 | TEMPERATURE: 97.9 F | WEIGHT: 220 LBS | OXYGEN SATURATION: 96 % | HEART RATE: 75 BPM

## 2020-05-12 VITALS
OXYGEN SATURATION: 97 % | RESPIRATION RATE: 25 BRPM | HEART RATE: 82 BPM | TEMPERATURE: 97.9 F | HEIGHT: 76 IN | DIASTOLIC BLOOD PRESSURE: 100 MMHG | WEIGHT: 220 LBS | SYSTOLIC BLOOD PRESSURE: 129 MMHG | BODY MASS INDEX: 26.79 KG/M2

## 2020-05-12 DIAGNOSIS — R11.10 VOMITING, INTRACTABILITY OF VOMITING NOT SPECIFIED, PRESENCE OF NAUSEA NOT SPECIFIED, UNSPECIFIED VOMITING TYPE: ICD-10-CM

## 2020-05-12 DIAGNOSIS — R11.15 CYCLIC VOMITING SYNDROME: ICD-10-CM

## 2020-05-12 DIAGNOSIS — R10.84 ABDOMINAL PAIN, GENERALIZED: ICD-10-CM

## 2020-05-12 LAB
ALBUMIN SERPL-MCNC: 4.3 G/DL (ref 3.4–5)
ALBUMIN UR-MCNC: NEGATIVE MG/DL
ALP SERPL-CCNC: 78 U/L (ref 40–150)
ALT SERPL W P-5'-P-CCNC: 41 U/L (ref 0–70)
ANION GAP SERPL CALCULATED.3IONS-SCNC: 8 MMOL/L (ref 3–14)
APPEARANCE UR: CLEAR
AST SERPL W P-5'-P-CCNC: 40 U/L (ref 0–45)
BILIRUB SERPL-MCNC: 0.9 MG/DL (ref 0.2–1.3)
BILIRUB UR QL STRIP: NEGATIVE
BUN SERPL-MCNC: 9 MG/DL (ref 7–30)
CALCIUM SERPL-MCNC: 9.1 MG/DL (ref 8.5–10.1)
CHLORIDE SERPL-SCNC: 108 MMOL/L (ref 94–109)
CO2 SERPL-SCNC: 25 MMOL/L (ref 20–32)
COLOR UR AUTO: YELLOW
CREAT SERPL-MCNC: 1.01 MG/DL (ref 0.66–1.25)
ERYTHROCYTE [DISTWIDTH] IN BLOOD BY AUTOMATED COUNT: 12.6 % (ref 10–15)
GFR SERPL CREATININE-BSD FRML MDRD: >90 ML/MIN/{1.73_M2}
GLUCOSE SERPL-MCNC: 102 MG/DL (ref 70–99)
GLUCOSE UR STRIP-MCNC: NEGATIVE MG/DL
HCT VFR BLD AUTO: 43 % (ref 40–53)
HGB BLD-MCNC: 14.8 G/DL (ref 13.3–17.7)
HGB UR QL STRIP: NEGATIVE
KETONES UR STRIP-MCNC: 10 MG/DL
LEUKOCYTE ESTERASE UR QL STRIP: NEGATIVE
LIPASE SERPL-CCNC: 136 U/L (ref 73–393)
MCH RBC QN AUTO: 29.3 PG (ref 26.5–33)
MCHC RBC AUTO-ENTMCNC: 34.4 G/DL (ref 31.5–36.5)
MCV RBC AUTO: 85 FL (ref 78–100)
MUCOUS THREADS #/AREA URNS LPF: PRESENT /LPF
NITRATE UR QL: NEGATIVE
PH UR STRIP: 7 PH (ref 5–7)
PLATELET # BLD AUTO: 215 10E9/L (ref 150–450)
POTASSIUM SERPL-SCNC: 4.3 MMOL/L (ref 3.4–5.3)
PROT SERPL-MCNC: 7.7 G/DL (ref 6.8–8.8)
RBC # BLD AUTO: 5.05 10E12/L (ref 4.4–5.9)
RBC #/AREA URNS AUTO: <1 /HPF (ref 0–2)
SODIUM SERPL-SCNC: 141 MMOL/L (ref 133–144)
SOURCE: ABNORMAL
SP GR UR STRIP: 1.02 (ref 1–1.03)
UROBILINOGEN UR STRIP-MCNC: NORMAL MG/DL (ref 0–2)
WBC # BLD AUTO: 8.2 10E9/L (ref 4–11)
WBC #/AREA URNS AUTO: <1 /HPF (ref 0–5)

## 2020-05-12 PROCEDURE — 25000125 ZZHC RX 250: Performed by: EMERGENCY MEDICINE

## 2020-05-12 PROCEDURE — 74177 CT ABD & PELVIS W/CONTRAST: CPT

## 2020-05-12 PROCEDURE — 96374 THER/PROPH/DIAG INJ IV PUSH: CPT | Mod: 59

## 2020-05-12 PROCEDURE — 96374 THER/PROPH/DIAG INJ IV PUSH: CPT

## 2020-05-12 PROCEDURE — 96361 HYDRATE IV INFUSION ADD-ON: CPT

## 2020-05-12 PROCEDURE — 25000128 H RX IP 250 OP 636: Performed by: EMERGENCY MEDICINE

## 2020-05-12 PROCEDURE — 96375 TX/PRO/DX INJ NEW DRUG ADDON: CPT

## 2020-05-12 PROCEDURE — 81001 URINALYSIS AUTO W/SCOPE: CPT | Performed by: EMERGENCY MEDICINE

## 2020-05-12 PROCEDURE — 99285 EMERGENCY DEPT VISIT HI MDM: CPT | Mod: 25

## 2020-05-12 PROCEDURE — 80053 COMPREHEN METABOLIC PANEL: CPT | Performed by: EMERGENCY MEDICINE

## 2020-05-12 PROCEDURE — 85027 COMPLETE CBC AUTOMATED: CPT | Performed by: EMERGENCY MEDICINE

## 2020-05-12 PROCEDURE — 83690 ASSAY OF LIPASE: CPT | Performed by: EMERGENCY MEDICINE

## 2020-05-12 PROCEDURE — 25800030 ZZH RX IP 258 OP 636: Performed by: EMERGENCY MEDICINE

## 2020-05-12 RX ORDER — ONDANSETRON 2 MG/ML
4 INJECTION INTRAMUSCULAR; INTRAVENOUS
Status: COMPLETED | OUTPATIENT
Start: 2020-05-12 | End: 2020-05-12

## 2020-05-12 RX ORDER — ONDANSETRON 2 MG/ML
4 INJECTION INTRAMUSCULAR; INTRAVENOUS EVERY 30 MIN PRN
Status: DISCONTINUED | OUTPATIENT
Start: 2020-05-12 | End: 2020-05-13 | Stop reason: HOSPADM

## 2020-05-12 RX ORDER — LORAZEPAM 2 MG/ML
1 INJECTION INTRAMUSCULAR ONCE
Status: COMPLETED | OUTPATIENT
Start: 2020-05-12 | End: 2020-05-12

## 2020-05-12 RX ORDER — ONDANSETRON 4 MG/1
4 TABLET, ORALLY DISINTEGRATING ORAL EVERY 8 HOURS PRN
Qty: 10 TABLET | Refills: 0 | Status: SHIPPED | OUTPATIENT
Start: 2020-05-12 | End: 2020-05-19

## 2020-05-12 RX ORDER — METOCLOPRAMIDE HYDROCHLORIDE 5 MG/ML
5 INJECTION INTRAMUSCULAR; INTRAVENOUS ONCE
Status: COMPLETED | OUTPATIENT
Start: 2020-05-12 | End: 2020-05-12

## 2020-05-12 RX ORDER — IOPAMIDOL 755 MG/ML
111 INJECTION, SOLUTION INTRAVASCULAR ONCE
Status: COMPLETED | OUTPATIENT
Start: 2020-05-12 | End: 2020-05-12

## 2020-05-12 RX ORDER — SODIUM CHLORIDE 9 MG/ML
1000 INJECTION, SOLUTION INTRAVENOUS CONTINUOUS
Status: DISCONTINUED | OUTPATIENT
Start: 2020-05-12 | End: 2020-05-12 | Stop reason: HOSPADM

## 2020-05-12 RX ORDER — METOCLOPRAMIDE 5 MG/1
5 TABLET ORAL 3 TIMES DAILY PRN
Qty: 15 TABLET | Refills: 0 | Status: SHIPPED | OUTPATIENT
Start: 2020-05-12 | End: 2022-06-20

## 2020-05-12 RX ADMIN — SODIUM CHLORIDE 1000 ML: 9 INJECTION, SOLUTION INTRAVENOUS at 20:09

## 2020-05-12 RX ADMIN — ONDANSETRON 4 MG: 2 INJECTION INTRAMUSCULAR; INTRAVENOUS at 20:09

## 2020-05-12 RX ADMIN — SODIUM CHLORIDE 1000 ML: 9 INJECTION, SOLUTION INTRAVENOUS at 03:13

## 2020-05-12 RX ADMIN — LORAZEPAM 1 MG: 2 INJECTION INTRAMUSCULAR; INTRAVENOUS at 03:14

## 2020-05-12 RX ADMIN — LORAZEPAM 1 MG: 2 INJECTION INTRAMUSCULAR; INTRAVENOUS at 20:09

## 2020-05-12 RX ADMIN — METOCLOPRAMIDE 5 MG: 5 INJECTION, SOLUTION INTRAMUSCULAR; INTRAVENOUS at 20:37

## 2020-05-12 RX ADMIN — ONDANSETRON 4 MG: 2 INJECTION INTRAMUSCULAR; INTRAVENOUS at 03:17

## 2020-05-12 RX ADMIN — SODIUM CHLORIDE 1000 ML: 9 INJECTION, SOLUTION INTRAVENOUS at 03:51

## 2020-05-12 RX ADMIN — SODIUM CHLORIDE 72 ML: 9 INJECTION, SOLUTION INTRAVENOUS at 21:36

## 2020-05-12 RX ADMIN — IOPAMIDOL 111 ML: 755 INJECTION, SOLUTION INTRAVENOUS at 21:36

## 2020-05-12 ASSESSMENT — ENCOUNTER SYMPTOMS
ABDOMINAL PAIN: 1
NAUSEA: 1
VOMITING: 1

## 2020-05-12 ASSESSMENT — MIFFLIN-ST. JEOR
SCORE: 2070.29
SCORE: 2054.41

## 2020-05-12 NOTE — ED PROVIDER NOTES
"  History     Chief Complaint:  Nausea & Vomiting     The history is provided by the patient and a relative (mother). The history is limited by a developmental delay.      Ravi Dunne is a 29 year old male with a history of HLD, anxiety, autism who presents to the emergency department for evaluation of nausea and vomiting. The patient's mother reports the patient was seen for cyclic vomiting around 1 year ago in the ED, on 3/24/2019. She notes the patient's vomiting had been present but manageable since discharge that day, but last night around 2000, the patient had onset of vomiting which was more intense than other episodes, prompting the mother to bring the patient to the ED. The patient here also complains of some abdominal pain. The patient's mother remarks the patient's vomiting typically improves when she is able to manage his stress, but it was not improving tonight.    Allergies:  NKDA     Medications:    Lexapro  Levothyroxine  Omeprazole  Trileptal  Seroquel    Past Medical History:    Anxiety  Autism  Hypothyroidism  Developmental delay  HLD    Past Surgical History:    The patient does not have any pertinent past surgical history.     Family History:    Alcoholism  Cancer  Autism    Social History:  Presents alone.  Never smoker.  Negative for alcohol use.  Marital Status:  Single [1]     Review of Systems   Gastrointestinal: Positive for abdominal pain, nausea and vomiting.   All other systems reviewed and are negative.      Physical Exam     Patient Vitals for the past 24 hrs:   BP Temp Temp src Pulse SpO2 Height Weight   05/12/20 0415 111/62 -- -- 75 -- -- --   05/12/20 0400 100/60 -- -- 75 -- -- --   05/12/20 0345 -- -- -- 84 -- -- --   05/12/20 0330 102/63 -- -- 72 -- -- --   05/12/20 0315 109/69 -- -- -- -- -- --   05/12/20 0301 -- 97.9  F (36.6  C) Oral -- -- -- --   05/12/20 0255 (!) 155/107 -- -- -- 96 % 1.956 m (6' 5\") 99.8 kg (220 lb)     Physical Exam  Eye:  Pupils are equal, round, and " "reactive.  Extraocular movements intact.    ENT:  No rhinorrhea.  Moist mucus membranes.  Normal tongue and tonsil.    Cardiac:  Regular rate and rhythm.  No murmurs, gallops, or rubs.    Pulmonary:  Clear to auscultation bilaterally.  No wheezes, rales, or rhonchi.    Abdomen:  Positive bowel sounds.  Abdomen is soft and non-distended, without focal tenderness.    Musculoskeletal:  Normal movement of all extremities without evidence for deficit.    Skin:  Warm and dry without rashes.    Neurologic:  Non-focal exam without asymmetric weakness or numbness.     Psychiatric:  Patient is autistic but interactive and at baseline per family.    Emergency Department Course     Interventions:  0313 NS 1L IV Bolus  0314 Ativan 1 mg IV  0317 Zofran 4 mg IV  0351 NS 1L IV Bolus    Emergency Department Course:  Past medical records, nursing notes, and vitals reviewed.    0309 I performed an exam of the patient as documented above.     0425 I rechecked the patient and discussed the results of his workup thus far.     Findings and plan explained to the patient and mother. Patient discharged home with instructions regarding supportive care, medications, and reasons to return. The importance of close follow-up was reviewed. The patient was prescribed Zofran.    Impression & Plan     Medical Decision Making:  Ravi Dunne is a 29 year old male with a history of autism presenting to us with another spell of cyclic vomiting.  Mother notes that this is not an uncommon issue, though she is typically able to \"calm him down\" to stop the vomiting from being an issue.  However, the vomiting ensued last night around 8 PM and he has been having issues with retching ever since.  She comes to the ER for IV Zofran and fluids.  This typically occurs approximately once a year that they need to come to the hospital.    On my exam, the patient is having an IV placed and seems to be doing fairly well.  He has no focal abdominal pain.  He was given " a dose of Zofran and Ativan along with 2 bags of fluid.  He had no further vomiting here and felt completely improved.  I did not check labs as this is only been going on for 6 hours and he otherwise has normal vital signs.  I feel the likelihood for metabolic derangement is low.  Without focal abdominal pain or fever this seems unlikely to represent appendicitis or diverticulitis.  Mother feels comfortable taking him home as this is exactly the same as what he has dealt with before.  I will prescribe Zofran at discharge.  However, I was very clear there should be no hesitation to return to us if he develops focal pain, returns vomiting, or if there are any other emergent concerns.    Diagnosis:    ICD-10-CM   1. Cyclic vomiting syndrome  R11.15       Disposition:  Discharged to home.    Discharge Medications:  New Prescriptions    ONDANSETRON (ZOFRAN ODT) 4 MG ODT TAB    Take 1 tablet (4 mg) by mouth every 8 hours as needed for nausea     Scribe Disclosure:  I, Yoshi Dotson, am serving as a scribe at 2:55 AM on 5/12/2020 to document services personally performed by Trierweiler, Chad A, MD based on my observations and the provider's statements to me.      EMERGENCY DEPARTMENT     Trierweiler, Chad A, MD  05/12/20 0638

## 2020-05-12 NOTE — ED AVS SNAPSHOT
Emergency Department  [address] [place] [address]  Phone:  [phone]  Fax:  [phone]                                    Ravi Dunne   MRN: [id number]    Department:   Emergency Department   Date of Visit:  5/12/2020           After Visit Summary Signature Page    I have received my discharge instructions, and my questions have been answered. I have discussed any challenges I see with this plan with the nurse or doctor.    ..........................................................................................................................................  Patient/Patient Representative Signature      ..........................................................................................................................................  Patient Representative Print Name and Relationship to Patient    ..................................................               ................................................  Date                                   Time    ..........................................................................................................................................  Reviewed by Signature/Title    ...................................................              ..............................................  Date                                               Time          22EPIC Rev 08/18

## 2020-05-12 NOTE — ED PROVIDER NOTES
History     Chief Complaint:  ***  Nausea & Vomiting      HPI   Ravi Dunne is a 29 year old male who presents with ***    Allergies:  ***  Allergies   Allergen Reactions     No Clinical Screening - See Comments      granola        Medications:    ***  ALL DAY ALLERGY 10 MG tablet  cetirizine (ZYRTEC) 10 MG tablet  escitalopram (LEXAPRO) 20 MG tablet  levothyroxine (SYNTHROID/LEVOTHROID) 125 MCG tablet  omeprazole (PRILOSEC) 20 MG DR capsule  ondansetron (ZOFRAN ODT) 4 MG ODT tab  OXcarbazepine (TRILEPTAL) 300 MG tablet  QUEtiapine (SEROQUEL) 25 MG tablet  QUEtiapine (SEROQUEL) 50 MG tablet        Problem List:    ***  Patient Active Problem List    Diagnosis Date Noted     Autoimmune hypothyroidism 02/14/2017     Priority: Medium     positive anti-thyroid antibodies       Cyclic vomiting syndrome      Priority: Medium     Hyperlipidemia LDL goal <160 10/31/2010     Priority: Medium     elevated triglycerides 209-271       Autism      Priority: Medium     Development delay      Priority: Medium     Anxiety      Priority: Medium        Past Medical History:    ***  Past Medical History:   Diagnosis Date     Anxiety      Autism      Autoimmune hypothyroidism 02/14/2017     Cyclic vomiting syndrome      Development delay      Hyperlipidemia 2008     Mood disorder (H)        Past Surgical History:    ***  Past Surgical History:   Procedure Laterality Date     NO HISTORY OF SURGERY         Family History:    ***  Family History   Problem Relation Age of Onset     Alcoholism Father      Cancer Father 50        throat cancer from smoking; in remission with chemo and surgery     Autism Spectrum Disorder Brother        Social History:  ***  Marital Status:  Single [1]  Social History     Tobacco Use     Smoking status: Never Smoker     Smokeless tobacco: Never Used   Substance Use Topics     Alcohol use: No     Drug use: No        Review of Systems  ***    Physical Exam     No data found.    Physical  "Exam  ***    Emergency Department Course     ECG:  ***     Imaging:  Radiology findings were communicated with the {Patient/MD:[id number]} who voiced understanding of the findings.    No orders to display       Laboratory:  Laboratory findings were communicated with the {Patient/MD:[id number]} who voiced understanding of the findings.    ***     Procedures  ***    Interventions:  ***    Medications - No data to display    Emergency Department Course:  Past medical records, nursing notes, and vitals reviewed.    *** I performed an exam of the patient as documented above.     ***EKG obtained in the ED, see results above.   ***IV was inserted and blood was drawn for laboratory testing, results above.  ***The patient provided a urine sample here in the emergency department. This was sent for laboratory testing, findings above.  ***The patient was sent for a *** while in the emergency department, results above.     *** I rechecked the patient and discussed the results of his workup thus far.     {edcdispo:947166}    I personally reviewed the laboratory *** and imaging results with the {PATIENT, FAMILY MEMBER, CAREGIVER:[id number]} and answered all related questions prior to {kldischadmittrans:033549}.     Impression & Plan     ***     {[facility] Quality Projects:[id number]}    {trauma activation?:913270::\" \"}    CMS Diagnoses: {Sepsis/Septic Shock/Stemi/Stroke:749025::\" \"}    Medical Decision Making:  ***     Critical Care Time: was *** minutes for this patient excluding procedures    Diagnosis:  No diagnosis found.    Disposition:  {kldisposition:059404}    Discharge Medications:  New Prescriptions    No medications on file       Scribe Disclosure:  I, ***, am serving as a scribe at 2:55 AM on 5/12/2020 to document services personally performed by Trierweiler, Chad A, MD*** based on my observations and the provider's statements to me.    EMERGENCY DEPARTMENT    "

## 2020-05-12 NOTE — DISCHARGE INSTRUCTIONS
Discharge Instructions  Vomiting    You have been seen today for vomiting (throwing up). This is usually caused by a virus, but some bacteria, parasites, medicines or other medical conditions can cause similar symptoms. At this time your provider does not find that your vomiting is a sign of anything dangerous or life-threatening. However, sometimes the signs of serious illness do not show up right away. If you have new or worse symptoms, you may need to be seen again in the Emergency Department or by your primary provider. Remember that serious problems like appendicitis can start as vomiting.    Generally, every Emergency Department visit should have a follow-up clinic visit with either a primary or a specialty clinic/provider. Please follow-up as instructed by your emergency provider today.    Return to the Emergency Department if:  You keep vomiting and you are not able to keep liquids down.   You feel you are getting dehydrated, such as being very thirsty, not urinating (peeing) at least every 8-12 hours, or feeling faint or lightheaded.   You develop a new fever, or your fever continues for more than 2 days.   You have abdominal (belly pain) that seems worse than cramps, is in one spot, or is getting worse over time. Appendicitis usually causes pain in the right lower abdomen (to the right and below your belly button) so watch for pain in this location.  You have blood in your vomit or stools.   You feel very weak.  You are not starting to improve within 24 hours of your visit here.     What can I do to help myself?  The most important thing to do is to drink clear liquids. If you have been vomiting a lot, it is best to have only small, frequent sips of liquids. Drinking too much at once may cause more vomiting. If you are vomiting often, you must replace minerals, sodium and potassium lost with your illness. Pedialyte  is the best available rehydration liquid but some find that it doesn t taste good so sports  drinks are an alterative. You can also drink clear liquids such as water, weak tea, apple juice, and 7-Up . Avoid acid liquids (orange), caffeine (coffee) or alcohol. Do not drink milk until you no longer have diarrhea (loose stools).   After liquids are staying down, you may start eating mild foods. Soda crackers, toast, plain noodles, gelatin, applesauce and bananas are good first choices. Avoid foods that have acid, are spicy, fatty or have a lot of fiber (such as meats, coarse grains, vegetables). You may start eating these foods again in about 3 days when you are better.   Sometimes treatment includes prescription medicine to prevent nausea (sick to your stomach) and vomiting. If your provider prescribes these for you, take them as directed.   Do not take ibuprofen, naproxen, or other nonsteroidal anti-inflammatory (NSAID) medicines without checking with your healthcare provider.     If you were given a prescription for medicine here today, be sure to read all of the information (including the package insert) that comes with your prescription.  This will include important information about the medicine, its side effects, and any warnings that you need to know about.  The pharmacist who fills the prescription can provide more information and answer questions you may have about the medicine.  If you have questions or concerns that the pharmacist cannot address, please call or return to the Emergency Department.     Remember that you can always come back to the Emergency Department if you are not able to see your regular provider in the amount of time listed above, if you get any new symptoms, or if there is anything that worries you.     Patient/Caregiver provided printed discharge information.

## 2020-05-12 NOTE — ED AVS SNAPSHOT
Emergency Department  [address] [place] [address]  Phone:  [phone]  Fax:  [phone]                                    Ravi Dunne   MRN: [id number]    Department:   Emergency Department   Date of Visit:  5/12/2020           After Visit Summary Signature Page    I have received my discharge instructions, and my questions have been answered. I have discussed any challenges I see with this plan with the nurse or doctor.    ..........................................................................................................................................  Patient/Patient Representative Signature      ..........................................................................................................................................  Patient Representative Print Name and Relationship to Patient    ..................................................               ................................................  Date                                   Time    ..........................................................................................................................................  Reviewed by Signature/Title    ...................................................              ..............................................  Date                                               Time          22EPIC Rev 08/18

## 2020-05-13 ENCOUNTER — HOSPITAL ENCOUNTER (OUTPATIENT)
Facility: CLINIC | Age: 30
Setting detail: OBSERVATION
Discharge: HOME | End: 2020-05-14
Attending: PHYSICIAN ASSISTANT | Admitting: HOSPITALIST
Payer: MEDICAID

## 2020-05-13 ENCOUNTER — HOSPITAL ENCOUNTER (EMERGENCY)
Facility: CLINIC | Age: 30
Discharge: HOME | End: 2020-05-13
Attending: PHYSICIAN ASSISTANT | Admitting: PHYSICIAN ASSISTANT
Payer: MEDICAID

## 2020-05-13 ENCOUNTER — NURSE TRIAGE (OUTPATIENT)
Dept: NURSING | Facility: CLINIC | Age: 30
End: 2020-05-13

## 2020-05-13 ENCOUNTER — VIRTUAL VISIT (OUTPATIENT)
Dept: INTERNAL MEDICINE | Facility: CLINIC | Age: 30
End: 2020-05-13
Payer: MEDICAID

## 2020-05-13 VITALS
BODY MASS INDEX: 25.98 KG/M2 | TEMPERATURE: 97.8 F | OXYGEN SATURATION: 98 % | RESPIRATION RATE: 16 BRPM | DIASTOLIC BLOOD PRESSURE: 80 MMHG | HEIGHT: 77 IN | WEIGHT: 220 LBS | SYSTOLIC BLOOD PRESSURE: 139 MMHG

## 2020-05-13 DIAGNOSIS — R10.84 ABDOMINAL PAIN, GENERALIZED: ICD-10-CM

## 2020-05-13 DIAGNOSIS — R10.84 ABDOMINAL PAIN, GENERALIZED: Primary | ICD-10-CM

## 2020-05-13 DIAGNOSIS — R11.2 NON-INTRACTABLE VOMITING WITH NAUSEA, UNSPECIFIED VOMITING TYPE: ICD-10-CM

## 2020-05-13 DIAGNOSIS — R11.2 INTRACTABLE NAUSEA AND VOMITING: ICD-10-CM

## 2020-05-13 LAB
ANION GAP SERPL CALCULATED.3IONS-SCNC: 4 MMOL/L (ref 3–14)
BASOPHILS # BLD AUTO: 0 10E9/L (ref 0–0.2)
BASOPHILS NFR BLD AUTO: 0.1 %
BUN SERPL-MCNC: 9 MG/DL (ref 7–30)
CALCIUM SERPL-MCNC: 9.3 MG/DL (ref 8.5–10.1)
CHLORIDE SERPL-SCNC: 108 MMOL/L (ref 94–109)
CO2 SERPL-SCNC: 29 MMOL/L (ref 20–32)
CREAT SERPL-MCNC: 1.03 MG/DL (ref 0.66–1.25)
DIFFERENTIAL METHOD BLD: NORMAL
EOSINOPHIL # BLD AUTO: 0.1 10E9/L (ref 0–0.7)
EOSINOPHIL NFR BLD AUTO: 0.6 %
ERYTHROCYTE [DISTWIDTH] IN BLOOD BY AUTOMATED COUNT: 12.6 % (ref 10–15)
GFR SERPL CREATININE-BSD FRML MDRD: >90 ML/MIN/{1.73_M2}
GLUCOSE SERPL-MCNC: 100 MG/DL (ref 70–99)
HCT VFR BLD AUTO: 42.7 % (ref 40–53)
HGB BLD-MCNC: 14.5 G/DL (ref 13.3–17.7)
IMM GRANULOCYTES # BLD: 0 10E9/L (ref 0–0.4)
IMM GRANULOCYTES NFR BLD: 0.1 %
LYMPHOCYTES # BLD AUTO: 1.1 10E9/L (ref 0.8–5.3)
LYMPHOCYTES NFR BLD AUTO: 13.7 %
MCH RBC QN AUTO: 29 PG (ref 26.5–33)
MCHC RBC AUTO-ENTMCNC: 34 G/DL (ref 31.5–36.5)
MCV RBC AUTO: 85 FL (ref 78–100)
MONOCYTES # BLD AUTO: 0.4 10E9/L (ref 0–1.3)
MONOCYTES NFR BLD AUTO: 4.4 %
NEUTROPHILS # BLD AUTO: 6.4 10E9/L (ref 1.6–8.3)
NEUTROPHILS NFR BLD AUTO: 81.1 %
NRBC # BLD AUTO: 0 10*3/UL
NRBC BLD AUTO-RTO: 0 /100
PLATELET # BLD AUTO: 181 10E9/L (ref 150–450)
POTASSIUM SERPL-SCNC: 4 MMOL/L (ref 3.4–5.3)
RBC # BLD AUTO: 5 10E12/L (ref 4.4–5.9)
SODIUM SERPL-SCNC: 141 MMOL/L (ref 133–144)
WBC # BLD AUTO: 7.9 10E9/L (ref 4–11)

## 2020-05-13 PROCEDURE — 25000128 H RX IP 250 OP 636: Performed by: PHYSICIAN ASSISTANT

## 2020-05-13 PROCEDURE — 25000128 H RX IP 250 OP 636: Performed by: HOSPITALIST

## 2020-05-13 PROCEDURE — 85025 COMPLETE CBC W/AUTO DIFF WBC: CPT | Performed by: PHYSICIAN ASSISTANT

## 2020-05-13 PROCEDURE — 25000131 ZZH RX MED GY IP 250 OP 636 PS 637: Performed by: HOSPITALIST

## 2020-05-13 PROCEDURE — 96360 HYDRATION IV INFUSION INIT: CPT | Mod: 59

## 2020-05-13 PROCEDURE — 99285 EMERGENCY DEPT VISIT HI MDM: CPT | Mod: 25

## 2020-05-13 PROCEDURE — 99285 EMERGENCY DEPT VISIT HI MDM: CPT | Mod: 25,27

## 2020-05-13 PROCEDURE — G0378 HOSPITAL OBSERVATION PER HR: HCPCS

## 2020-05-13 PROCEDURE — 96374 THER/PROPH/DIAG INJ IV PUSH: CPT

## 2020-05-13 PROCEDURE — 96375 TX/PRO/DX INJ NEW DRUG ADDON: CPT

## 2020-05-13 PROCEDURE — 99207 ZZC OFFICE-HOSPITAL ADMIT: CPT | Performed by: PHYSICIAN ASSISTANT

## 2020-05-13 PROCEDURE — 25800030 ZZH RX IP 258 OP 636: Performed by: PHYSICIAN ASSISTANT

## 2020-05-13 PROCEDURE — 96372 THER/PROPH/DIAG INJ SC/IM: CPT

## 2020-05-13 PROCEDURE — 80048 BASIC METABOLIC PNL TOTAL CA: CPT | Performed by: PHYSICIAN ASSISTANT

## 2020-05-13 PROCEDURE — 96361 HYDRATE IV INFUSION ADD-ON: CPT

## 2020-05-13 PROCEDURE — 25800030 ZZH RX IP 258 OP 636: Performed by: HOSPITALIST

## 2020-05-13 PROCEDURE — 99219 ZZC INITIAL OBSERVATION CARE,LEVL II: CPT | Performed by: HOSPITALIST

## 2020-05-13 RX ORDER — OXCARBAZEPINE 600 MG/1
600 TABLET, FILM COATED ORAL AT BEDTIME
Status: DISCONTINUED | OUTPATIENT
Start: 2020-05-13 | End: 2020-05-13

## 2020-05-13 RX ORDER — ONDANSETRON 2 MG/ML
4 INJECTION INTRAMUSCULAR; INTRAVENOUS ONCE
Status: COMPLETED | OUTPATIENT
Start: 2020-05-13 | End: 2020-05-13

## 2020-05-13 RX ORDER — ONDANSETRON 2 MG/ML
4 INJECTION INTRAMUSCULAR; INTRAVENOUS EVERY 6 HOURS PRN
Status: DISCONTINUED | OUTPATIENT
Start: 2020-05-13 | End: 2020-05-14 | Stop reason: HOSPADM

## 2020-05-13 RX ORDER — LORAZEPAM 2 MG/ML
1 INJECTION INTRAMUSCULAR ONCE
Status: COMPLETED | OUTPATIENT
Start: 2020-05-13 | End: 2020-05-13

## 2020-05-13 RX ORDER — SUMATRIPTAN 6 MG/.5ML
6 INJECTION, SOLUTION SUBCUTANEOUS ONCE
Status: COMPLETED | OUTPATIENT
Start: 2020-05-13 | End: 2020-05-13

## 2020-05-13 RX ORDER — ACETAMINOPHEN 650 MG/1
650 SUPPOSITORY RECTAL EVERY 4 HOURS PRN
Status: DISCONTINUED | OUTPATIENT
Start: 2020-05-13 | End: 2020-05-14 | Stop reason: HOSPADM

## 2020-05-13 RX ORDER — QUETIAPINE FUMARATE 50 MG/1
50 TABLET, FILM COATED ORAL AT BEDTIME
Status: DISCONTINUED | OUTPATIENT
Start: 2020-05-13 | End: 2020-05-14 | Stop reason: HOSPADM

## 2020-05-13 RX ORDER — OXCARBAZEPINE 300 MG/1
300 TABLET, FILM COATED ORAL AT BEDTIME
Status: DISCONTINUED | OUTPATIENT
Start: 2020-05-13 | End: 2020-05-13

## 2020-05-13 RX ORDER — LEVOTHYROXINE SODIUM 125 UG/1
125 TABLET ORAL
Status: DISCONTINUED | OUTPATIENT
Start: 2020-05-14 | End: 2020-05-14 | Stop reason: HOSPADM

## 2020-05-13 RX ORDER — OXCARBAZEPINE 300 MG/1
900 TABLET, FILM COATED ORAL AT BEDTIME
Status: DISCONTINUED | OUTPATIENT
Start: 2020-05-13 | End: 2020-05-14 | Stop reason: HOSPADM

## 2020-05-13 RX ORDER — ACETAMINOPHEN 325 MG/1
650 TABLET ORAL EVERY 4 HOURS PRN
Status: DISCONTINUED | OUTPATIENT
Start: 2020-05-13 | End: 2020-05-14 | Stop reason: HOSPADM

## 2020-05-13 RX ORDER — METOCLOPRAMIDE HYDROCHLORIDE 5 MG/ML
10 INJECTION INTRAMUSCULAR; INTRAVENOUS ONCE
Status: COMPLETED | OUTPATIENT
Start: 2020-05-13 | End: 2020-05-13

## 2020-05-13 RX ORDER — LIDOCAINE 40 MG/G
CREAM TOPICAL
Status: DISCONTINUED | OUTPATIENT
Start: 2020-05-13 | End: 2020-05-14 | Stop reason: HOSPADM

## 2020-05-13 RX ORDER — PROCHLORPERAZINE MALEATE 5 MG
10 TABLET ORAL EVERY 6 HOURS PRN
Status: DISCONTINUED | OUTPATIENT
Start: 2020-05-13 | End: 2020-05-14 | Stop reason: HOSPADM

## 2020-05-13 RX ORDER — SODIUM CHLORIDE 9 MG/ML
INJECTION, SOLUTION INTRAVENOUS CONTINUOUS
Status: DISCONTINUED | OUTPATIENT
Start: 2020-05-13 | End: 2020-05-14

## 2020-05-13 RX ORDER — ONDANSETRON 4 MG/1
4 TABLET, ORALLY DISINTEGRATING ORAL EVERY 6 HOURS PRN
Status: DISCONTINUED | OUTPATIENT
Start: 2020-05-13 | End: 2020-05-14 | Stop reason: HOSPADM

## 2020-05-13 RX ORDER — PROCHLORPERAZINE 25 MG
25 SUPPOSITORY, RECTAL RECTAL EVERY 12 HOURS PRN
Status: DISCONTINUED | OUTPATIENT
Start: 2020-05-13 | End: 2020-05-14 | Stop reason: HOSPADM

## 2020-05-13 RX ORDER — OXCARBAZEPINE 600 MG/1
600 TABLET, FILM COATED ORAL AT BEDTIME
COMMUNITY

## 2020-05-13 RX ORDER — ESCITALOPRAM OXALATE 10 MG/1
20 TABLET ORAL AT BEDTIME
Status: DISCONTINUED | OUTPATIENT
Start: 2020-05-13 | End: 2020-05-14 | Stop reason: HOSPADM

## 2020-05-13 RX ORDER — QUETIAPINE FUMARATE 25 MG/1
25 TABLET, FILM COATED ORAL 2 TIMES DAILY
Status: DISCONTINUED | OUTPATIENT
Start: 2020-05-14 | End: 2020-05-14 | Stop reason: HOSPADM

## 2020-05-13 RX ORDER — CETIRIZINE HYDROCHLORIDE 10 MG/1
10 TABLET ORAL DAILY
Status: DISCONTINUED | OUTPATIENT
Start: 2020-05-13 | End: 2020-05-14 | Stop reason: HOSPADM

## 2020-05-13 RX ORDER — NALOXONE HYDROCHLORIDE 0.4 MG/ML
.1-.4 INJECTION, SOLUTION INTRAMUSCULAR; INTRAVENOUS; SUBCUTANEOUS
Status: DISCONTINUED | OUTPATIENT
Start: 2020-05-13 | End: 2020-05-14 | Stop reason: HOSPADM

## 2020-05-13 RX ADMIN — ONDANSETRON 4 MG: 2 INJECTION INTRAMUSCULAR; INTRAVENOUS at 21:02

## 2020-05-13 RX ADMIN — METOCLOPRAMIDE 10 MG: 5 INJECTION, SOLUTION INTRAMUSCULAR; INTRAVENOUS at 12:28

## 2020-05-13 RX ADMIN — ONDANSETRON 4 MG: 2 INJECTION INTRAMUSCULAR; INTRAVENOUS at 11:34

## 2020-05-13 RX ADMIN — PROCHLORPERAZINE EDISYLATE 5 MG: 5 INJECTION INTRAMUSCULAR; INTRAVENOUS at 19:03

## 2020-05-13 RX ADMIN — SODIUM CHLORIDE 1000 ML: 9 INJECTION, SOLUTION INTRAVENOUS at 13:29

## 2020-05-13 RX ADMIN — SODIUM CHLORIDE: 9 INJECTION, SOLUTION INTRAVENOUS at 21:02

## 2020-05-13 RX ADMIN — SODIUM CHLORIDE 1000 ML: 9 INJECTION, SOLUTION INTRAVENOUS at 11:32

## 2020-05-13 RX ADMIN — SUMATRIPTAN SUCCINATE 6 MG: 6 INJECTION SUBCUTANEOUS at 22:31

## 2020-05-13 RX ADMIN — LORAZEPAM 1 MG: 2 INJECTION INTRAMUSCULAR; INTRAVENOUS at 11:35

## 2020-05-13 ASSESSMENT — ENCOUNTER SYMPTOMS
NAUSEA: 1
VOMITING: 1
ABDOMINAL PAIN: 1
DIARRHEA: 0
DIARRHEA: 0
VOMITING: 1
FEVER: 0
BLOOD IN STOOL: 0
COUGH: 0
NAUSEA: 1
ABDOMINAL PAIN: 1

## 2020-05-13 ASSESSMENT — ACTIVITIES OF DAILY LIVING (ADL)
SWALLOWING: 0-->SWALLOWS FOODS/LIQUIDS WITHOUT DIFFICULTY
AMBULATION: 0-->INDEPENDENT
DRESS: 0-->INDEPENDENT
RETIRED_COMMUNICATION: 0-->UNDERSTANDS/COMMUNICATES WITHOUT DIFFICULTY
TRANSFERRING: 0-->INDEPENDENT
BATHING: 0-->INDEPENDENT
FALL_HISTORY_WITHIN_LAST_SIX_MONTHS: NO
RETIRED_EATING: 0-->INDEPENDENT
TOILETING: 0-->INDEPENDENT
COGNITION: 0 - NO COGNITION ISSUES REPORTED

## 2020-05-13 ASSESSMENT — MIFFLIN-ST. JEOR
SCORE: 2070.29
SCORE: 2054.41

## 2020-05-13 NOTE — ED TRIAGE NOTES
Pt was just discharged a few hours ago with same symptoms. Patients mother states that since he got home, its gotten worse.

## 2020-05-13 NOTE — DISCHARGE INSTRUCTIONS
*Drink plenty of fluids and advance diet slowly.  *Take medications as prescribed.  Zofran as prescribed. Continue your current medications.  *Follow-up with your doctor for a recheck in 2-3 days.  *Return if you are unable to keep fluids down, develop severe abdominal pain, faint or feel like you will faint or become worse in any way.      Discharge Instructions  Vomiting    You have been seen today for vomiting (throwing up). This is usually caused by a virus, but some bacteria, parasites, medicines or other medical conditions can cause similar symptoms. At this time your provider does not find that your vomiting is a sign of anything dangerous or life-threatening. However, sometimes the signs of serious illness do not show up right away. If you have new or worse symptoms, you may need to be seen again in the Emergency Department or by your primary provider. Remember that serious problems like appendicitis can start as vomiting.    Generally, every Emergency Department visit should have a follow-up clinic visit with either a primary or a specialty clinic/provider. Please follow-up as instructed by your emergency provider today.    Return to the Emergency Department if:  You keep vomiting and you are not able to keep liquids down.   You feel you are getting dehydrated, such as being very thirsty, not urinating (peeing) at least every 8-12 hours, or feeling faint or lightheaded.   You develop a new fever, or your fever continues for more than 2 days.   You have abdominal (belly pain) that seems worse than cramps, is in one spot, or is getting worse over time. Appendicitis usually causes pain in the right lower abdomen (to the right and below your belly button) so watch for pain in this location.  You have blood in your vomit or stools.   You feel very weak.  You are not starting to improve within 24 hours of your visit here.     What can I do to help myself?  The most important thing to do is to drink clear liquids.  If you have been vomiting a lot, it is best to have only small, frequent sips of liquids. Drinking too much at once may cause more vomiting. If you are vomiting often, you must replace minerals, sodium and potassium lost with your illness. Pedialyte  is the best available rehydration liquid but some find that it doesn t taste good so sports drinks are an alterative. You can also drink clear liquids such as water, weak tea, apple juice, and 7-Up . Avoid acid liquids (orange), caffeine (coffee) or alcohol. Do not drink milk until you no longer have diarrhea (loose stools).   After liquids are staying down, you may start eating mild foods. Soda crackers, toast, plain noodles, gelatin, applesauce and bananas are good first choices. Avoid foods that have acid, are spicy, fatty or have a lot of fiber (such as meats, coarse grains, vegetables). You may start eating these foods again in about 3 days when you are better.   Sometimes treatment includes prescription medicine to prevent nausea (sick to your stomach) and vomiting. If your provider prescribes these for you, take them as directed.   Do not take ibuprofen, naproxen, or other nonsteroidal anti-inflammatory (NSAID) medicines without checking with your healthcare provider.     If you were given a prescription for medicine here today, be sure to read all of the information (including the package insert) that comes with your prescription.  This will include important information about the medicine, its side effects, and any warnings that you need to know about.  The pharmacist who fills the prescription can provide more information and answer questions you may have about the medicine.  If you have questions or concerns that the pharmacist cannot address, please call or return to the Emergency Department.     Remember that you can always come back to the Emergency Department if you are not able to see your regular provider in the amount of time listed above, if you get  any new symptoms, or if there is anything that worries you.

## 2020-05-13 NOTE — PROGRESS NOTES
"Ravi Dunne is a 29 year old male who is being evaluated via a billable video visit.      The patient has been notified of following:     \"This video visit will be conducted via a call between you and your physician/provider. We have found that certain health care needs can be provided without the need for an in-person physical exam.  This service lets us provide the care you need with a video conversation.  If a prescription is necessary we can send it directly to your pharmacy.  If lab work is needed we can place an order for that and you can then stop by our lab to have the test done at a later time.    Video visits are billed at different rates depending on your insurance coverage.  Please reach out to your insurance provider with any questions.    If during the course of the call the physician/provider feels a video visit is not appropriate, you will not be charged for this service.\"    Patient has given verbal consent for Video visit? Yes    How would you like to obtain your AVS? Mail a copy    Patient would like the video invitation sent by: Text to cell phone: [phone]    Will anyone else be joining your video visit? No      Subjective     Ravi Dunne is a 29 year old male who presents today via video visit for the following health issues:    HPI  ED/UC Followup:    Facility:  [facility] ER  Date of visit: 5/12/20  Reason for visit: Nausea & Vomiting   Current Status: Patient mother states no vomiting today. Patient complaining of stomach pain and not feeling well today.      Patient's mother is on the video visit and reports the history. She notes patient is not doing well. Pain has increased since the ED last night. He has been unable to eat since early yesterday. Patient is reportedly in care, crying in pain demanding to be seen by a doctor in person. Mother can not identify if location of pain has changed. She notes son's behavior is not typical of previous episodes of cyclical vomiting syndrome. " "    Video Start Time: 10:21    Reviewed and updated as needed this visit by Provider  Meds  Problems               Objective    There were no vitals taken for this visit.  Estimated body mass index is 26.78 kg/m  as calculated from the following:    Height as of 5/12/20: 1.93 m (6' 4\").    Weight as of 5/12/20: 99.8 kg (220 lb).  Physical Exam     Unable to perform exam as patient is not present.     Diagnostic Test Results:  Labs reviewed in Epic        Assessment & Plan     1. Abdominal pain, generalized  - worsening abdominal pain, patient reportedly crying in pain, requesting to be seen by a doctor in person   - advised to seek emergency care for further evaluation and treatment          No follow-ups on file.    Lidia Scales PA-C  [facility]      Video-Visit Details    Type of service:  Video Visit    Video End Time:10:31    Originating Location (pt. Location): Home    Distant Location (provider location):  [facility]     Platform used for Video Visit: AnnaWell    No follow-ups on file.       Lidia Scales PA-C        "

## 2020-05-13 NOTE — PHARMACY-ADMISSION MEDICATION HISTORY
Pharmacy Medication History  Admission medication history interview status for the 5/13/2020  admission is complete. See EPIC admission navigator for prior to admission medications     Medication history sources: Reviewed recent fill history through [facility].  Spoke w/ patient's mom, Sol.    Medication history source reliability: Moderate  Adherence assessment: Moderate    Significant changes made to the medication list:  - Updated Quetiapine dose.     Medication reconciliation completed by provider prior to medication history? No    Time spent in this activity: 20 minutes      Prior to Admission medications    Medication Sig Last Dose Taking? Auth Provider   cetirizine (ZYRTEC) 10 MG tablet Take 1 tablet (10 mg) by mouth daily Past Week at Unknown time Yes Bhaskar Gandara MD   escitalopram (LEXAPRO) 20 MG tablet Take 1 tablet (20 mg) by mouth At Bedtime Past Week at Unknown time Yes Bhaskar Gandara MD   levothyroxine (SYNTHROID/LEVOTHROID) 125 MCG tablet Take 1 tablet (125 mcg) by mouth daily Past Week at Unknown time Yes Bhaskar Gandara MD   metoclopramide (REGLAN) 5 MG tablet Take 1 tablet (5 mg) by mouth 3 times daily as needed (nausea/vomiting) As needed for nausea/vomiting  at prn Yes Wenceslao Libeerman MD   omeprazole (PRILOSEC) 20 MG DR capsule Take 1 capsule (20 mg) by mouth daily Past Week at Unknown time Yes Bhaskar Gandara MD   ondansetron (ZOFRAN ODT) 4 MG ODT tab Take 1 tablet (4 mg) by mouth every 8 hours as needed for nausea  at prn Yes Trierweiler, Chad A, MD   OXcarbazepine (TRILEPTAL) 300 MG tablet Take 300 mg by mouth At Bedtime Takes in addition to 600 mg tab = 900 mg total at bedtime Past Week at Unknown time Yes Bhaskar Gandara MD   OXcarbazepine (TRILEPTAL) 600 MG tablet Take 600 mg by mouth At Bedtime Takes in addition to 300 mg tab = 900 mg total at bedtime Past Week at Unknown time Yes Unknown, Entered By History   QUEtiapine  (SEROQUEL) 25 MG tablet Take 25 mg by mouth 2 times daily At 3 pm and 6 pm Past Week at Unknown time Yes Bhaskar Gandara MD   QUEtiapine (SEROQUEL) 50 MG tablet Take 50 mg by mouth At Bedtime  Past Week at Unknown time Yes Bhaskar Gandara MD Carolyn Dahlman, PharmD, BCPS

## 2020-05-13 NOTE — ED AVS SNAPSHOT
Emergency Department  [address] [place] [address]  Phone:  [phone]  Fax:  [phone]                                    Ravi Dunne   MRN: [id number]    Department:   Emergency Department   Date of Visit:  5/13/2020           After Visit Summary Signature Page    I have received my discharge instructions, and my questions have been answered. I have discussed any challenges I see with this plan with the nurse or doctor.    ..........................................................................................................................................  Patient/Patient Representative Signature      ..........................................................................................................................................  Patient Representative Print Name and Relationship to Patient    ..................................................               ................................................  Date                                   Time    ..........................................................................................................................................  Reviewed by Signature/Title    ...................................................              ..............................................  Date                                               Time          22EPIC Rev 08/18

## 2020-05-13 NOTE — ED PROVIDER NOTES
History     Chief Complaint:  Nausea & Vomiting    HPI   HPI limited secondary to patient's autism. HPI provided by patient's mother.     Ravi Dunne is a 29 year old male, with a history of autism and cyclic vomiting syndrome, who presents with his mother to the ED for evaluation of nausea and vomiting. On review of patient s record, he was seen yesterday for similar symptoms. He had a CT scan performed. He was prescribed Zofran and Reglan. He was seen at [facility] earlier today again after recommendation by primary care through a virtual visit. Upon evaluation, the patient's mother reports the patient wanted to be discharged home. He has been dry heaving since discharge. He did take Zofran when he got home. He seems to be in pain and is pointing to his abdomen. It has been over a year since he has needed to visit the ED due to his cyclical vomiting and has never required multiple visits. No hematemesis, diarrhea, or blood in stool.     CT Abdomen Pelvis w Contrast, 5/12/2020  IMPRESSION:   1.  Unremarkable CT of the abdomen and pelvis. No cause for pain demonstrated.  SARI DOYLE MD    Laboratory, 5/13/2020  CBC: o/w WNL (WBC 7.9, HGB 14.5, )  BMP: Glucose 100(H), o/w WNL (Creatinine 1.03)    Allergies:  No known drug allergies    Medications:    Zyrtec  Lexapro  Levothyroxine   Reglan  Omeprazole  Zofran-ODT  Trileptal  Seroquel     Past Medical History:    Anxiety              Autism   Autoimmune hypothyroidism    Cyclic vomiting syndrome         Development delay       HLD         Mood disorder       Chronic motor/vocal tic disorder     Past Surgical History:    History reviewed. No pertinent surgical history.    Family History:    Alcoholism, throat cancer: father  Autism spectrum disorder: brother     Social History:  Smoking status: Never smoker    Substance use: No  Alcohol use: No  Presents to ED with mother    Marital Status:  Single [1]     Review of Systems   Gastrointestinal:  "Positive for abdominal pain, nausea and vomiting. Negative for blood in stool and diarrhea.   ROS limited secondary to patient's autism.   Physical Exam     Patient Vitals for the past 24 hrs:   BP Temp Temp src Heart Rate Resp SpO2 Height Weight   05/13/20 1832 -- 98.7  F (37.1  C) Oral -- -- -- -- --   05/13/20 1829 (!) 142/95 -- -- 71 16 98 % 1.93 m (6' 4\") 99.8 kg (220 lb)     Physical Exam  General: Resting comfortably.  Alert and oriented. Mother by bedside.   Head:  The scalp, face, and head appear normal   Eyes:  Conjunctivae and sclerae are normal    ENT:    The oropharynx is normal    Uvula is in the midline     Dry mucous membranes.   Neck:  No lymphadenopathy  CV:  Regular rate and rhythm     Normal S1/S2  Resp:  Lungs are clear to auscultation    Non-labored    No rales or wheezing   GI:  Abdomen is soft, non-distended    No abdominal tenderness. No rebound or guarding.      Normal bowel sounds   MS:  Normal muscular tone   Skin:  No rash or acute skin lesions noted   Neuro: Alert. Developmental delay. Acting appropriately. Mother by bedside.     Emergency Department Course     Interventions:  1903: Compazine 5mg IV    Emergency Department Course:  Past medical records, nursing notes, and vitals reviewed.    1837: I performed an exam of the patient as documented above.     1858: I spoke to Dr. Mccann of the hospitalist service who accepts the patient for admission.     1903: IV was inserted, and patient was provided medication as noted above.    Findings and plan explained to the patient and mother who consents to admission. Discussed the patient with Dr. Mccann, who will admit the patient to an obs bed for further monitoring, evaluation, and treatment.    I personally answered all related questions prior to admission.     Impression & Plan      Medical Decision Making:  Ravi Dunne is a 29 year old male, with a history of autism and cyclic vomiting syndrome, who presents with his mother to the ED for " evaluation of nausea and vomiting. Please refer to the HPI for full details.  The patient has been seen 4 times in the last couple of days for similar symptoms.  He was offered admission earlier today, but mother and patient declined at the time.  He states that since he was discharged, he has been dry heaving and miserable at home, prompting his reevaluation.  Given he had a recent work-up earlier today, recent CT scan, and urine sample, I do not feel any additional emergent work-up need to be obtained at this time.  Patient was given Compazine.  A broad differential was considered including but limited to gastritis, biliary colic, cholecystitis, peptic ulcer disease, pancreatitis, diverticulitis, appendicitis, small bowel obstruction, etc.  No signs or symptoms of the above etiology.  The patient's abdominal exam is completely benign, and I do not feel additional emergent imaging is needed at this time. Given ongoing symptoms and failed outpatient management,  I did touch base with the hospitalist Dr. Mccann.  He will admit this patient to observation.  All questions were answered prior to admission.  The mother understands and agrees with plan.    Diagnosis:    ICD-10-CM   1. Intractable nausea and vomiting  R11.2     Disposition: Patient admitted to an obs bed by Dr. Siobhan Blackmon  5/13/2020    EMERGENCY DEPARTMENT    Scribe Disclosure:  I, Nat Blackmon, am serving as a scribe at 6:37 PM on 5/13/2020 to document services personally performed by Marcela Garland PA-C based on my observations and the provider's statements to me.        Marcela Garland PA-C  05/13/20 3952

## 2020-05-13 NOTE — ED PROVIDER NOTES
"  History     Chief Complaint:  Nausea & Vomiting     HPI    history limited as patient is a poor historian, and supplemented by electronic chart review as well as his mother at bedside.    Ravi Dunne is a 29 year old male who presents with his mother for evaluation of ongoing vomiting as well as abdominal cramping.  His mother states that he has a history of \"cyclic vomiting syndrome\" since the age of 3, for which he occasionally comes emergency department but typically responds well to treatments and does not require hospitalization.  No prior abdominal surgeries.  He was seen in this emergency department very early this morning, and was improved after symptomatic treatment and discharged home with Zofran and omeprazole.  He states that over the course of the day he had several more episodes of nonbloody vomiting.  He had a formed bowel movement earlier today.  He has waves of abdominal pain.  He does not smoke marijuana.  No recent medication changes other than the medications prescribed earlier today from this emergency department.  No alcohol use.  Mother states that the pain is atypical for him, and she wonders whether something additional is going on.  He has not had any urinary symptoms, and has no cough, fevers, or trouble breathing or body aches.    Allergies:  NKDA     Medications:    Escitalpram  levothyroxine  omeprazole  Zofran   Oxcarbazepine  Seroquel    Past Medical History:    Anxiety  Autism  Hypothyroidism   Cyclic vomiting syndrome  Developmental delay  Hyperlipidemia   Chronic motor and vocal tic disorder    Past Surgical History:    The patient does not have any pertinent past surgical history.     Family History:    Father - alcoholism, throat cancer (smoker)  Brother - autism     Social History:  Tobacco use: negative   Alcohol use: negative   PCP: Bhaskar Gandara     Review of Systems   All other systems reviewed and are negative.    Physical Exam     Patient Vitals for the " "past 24 hrs:   BP Temp Temp src Pulse Resp SpO2 Height Weight   05/12/20 2033 -- -- -- -- 25 -- -- --   05/12/20 1944 (!) 129/100 97.9  F (36.6  C) Oral 82 -- 97 % 1.93 m (6' 4\") 99.8 kg (220 lb)        Physical Exam  General: Nontoxic-appearing male sitting upright in room 11, mother at bedside  HENT: mucous membranes somewhat dry, OP clear  CV: rate as above  Resp: clear throughout, normal effort, no crackles or wheezing  GI: abdomen soft, minimal diffuse tenderness without guarding, negative Cross sign, no focal tenderness of McBurney's, no distention  MSK: no bony tenderness, no CVAT  Skin: appropriately warm and dry  Neuro: alert, clear speech, oriented though poor historian  Psych: cooperative, slightly anxious    Emergency Department Course     Imaging:  Radiographic findings were communicated with the patient who voiced understanding of the findings.    CT Abdomen Pelvis w Contrast  1.  Unremarkable CT of the abdomen and pelvis. No cause for pain  demonstrated.   As read by Radiology.    Laboratory:  Laboratory findings were communicated with the patient who voiced understanding of the findings.    CMP: glucose 102 (H) o/w WNL (Creatinine 1.01)  CBC: WBC 8.2, HGB 14.8,    Lipase: 136     UA: ketones 10, mucous present, o/w negative     Interventions:  2009 NS 1 L IV   Zofran 4 mg IV   Ativan 1 mg IV  2037 Reglan 5 mg IV     Emergency Department Course:  Past medical records, nursing notes, and vitals reviewed.    1936 I performed an exam of the patient as documented above.     IV was inserted and blood was drawn for laboratory testing, results above.    The patient was sent for an abdomen pelvis CT while in the emergency department, findings above.     I performed electronic chart review in [employer].    Patient rechecked and updated.      I personally reviewed the laboratory results with the Patient and answered all related questions prior to discharge. I prescribed Reglan.     Impression & Plan " "    Medical Decision Making:  Ultimately I think his presentation is likely due to a flare of his longstanding \"cyclic vomiting syndrome\", though due to refractory vomiting including some here in the emergency department, as well as most concerned that his pain was somewhat more than usual, we discussed potential risks and benefits of CT imaging and laboratory studies and ultimately pursued this diagnostic strategy.  Fortunately, the test results are benign.  I have performed 3 serial abdominal exams and he remains without peritonitis or other more worrisome features.  He is now tolerating oral intake.  Urinalysis is benign and I do not think he requires surgical consultation, hospitalization, antibiotics, or other more aggressive treatments at this time.  I added Reglan to his medication regimen, cautioning mother about potential adverse effects.  Close follow-up through clinic was recommended and he should return here in the unexpected event of sudden worsening.  Despite this being his second visit today, I do not think he requires hospitalization, and both patient and mother confirmed that they are comfortable with him being discharged given his clinical course here tonight in combination with test results, accepting the diagnostic uncertainty.      Discharge Diagnosis:    ICD-10-CM    1. Vomiting, intractability of vomiting not specified, presence of nausea not specified, unspecified vomiting type  R11.10    2. Abdominal pain, generalized  R10.84      Disposition:  Discharged to home     Discharge Medications:  New Prescriptions    METOCLOPRAMIDE (REGLAN) 5 MG TABLET    Take 1 tablet (5 mg) by mouth 3 times daily as needed (nausea/vomiting) As needed for nausea/vomiting       Scribe Disclosure:  IMary, am serving as a scribe at 7:36 PM on 5/12/2020 to document services personally performed by Wenceslao Lieberman MD based on my observations and the provider's statements to me.      IBina, am " serving as a scribe at 10:00 PM on 5/12/2020 to document services personally performed by Wenceslao Lieberman MD based on my observations and the provider's statements to me.       This record was created at least in part using electronic voice recognition software, so please excuse any typographical errors.      Wenceslao Lieberman MD  05/12/20 2234

## 2020-05-13 NOTE — ED TRIAGE NOTES
Pt here for cyclical vomiting. Seen in ED twice yesterday for same thing. Mother states antiemetic medications not helping. Now has abdominal pain.

## 2020-05-13 NOTE — TELEPHONE ENCOUNTER
"Mother states that patient has abdominal pain and nausea ; was seen in ED and offered  admission but patient refused ; was sent home to return if needed; refusing to take  anything orally and wants to go back  Mom has given  Zofran  But not reglan   Advised to try Reglan or  Return to eED   mother  understands and willl comply    Triage protocol reviewed and  ED AVS reviewed    Advised to return to ED       Reason for Disposition    Patient sounds very sick or weak to the triager    Additional Information    Negative: Shock suspected (e.g., cold/pale/clammy skin, too weak to stand, low BP, rapid pulse)    Negative: Difficult to awaken or acting confused (e.g., disoriented, slurred speech)    Negative: Passed out (i.e., lost consciousness, collapsed and was not responding)    Negative: Sounds like a life-threatening emergency to the triager    Negative: Chest pain    Negative: Pain is mainly in upper abdomen  (if needed ask: \"is it mainly above the belly button?\")    Negative: Followed an abdomen (stomach) injury    Negative: [1] SEVERE pain (e.g., excruciating) AND [2] present > 1 hour    Negative: [1] SEVERE pain AND [2] age > 60    Negative: [1] Vomiting AND [2] contains red blood or black (\"coffee ground\") material  (Exception: few red streaks in vomit that only happened once)    Negative: Blood in bowel movements  (Exception: Blood on surface of BM with constipation)    Negative: Black or tarry bowel movements  (Exception: chronic-unchanged  black-grey bowel movements AND is taking iron pills or Pepto-bismol)    Negative: [1] Unable to urinate (or only a few drops) > 4 hours AND [2] bladder feels very full (e.g., palpable bladder or strong urge to urinate)    Protocols used: ABDOMINAL PAIN - MALE-A-AH      "

## 2020-05-13 NOTE — ED NOTES
"[facility]  ED Nurse Handoff Report    ED Chief complaint: Nausea & Vomiting and Abdominal Pain      ED Diagnosis:   Final diagnoses:   None       Code Status: Full Code, Admitting MD to confirm.    Allergies:   Allergies   Allergen Reactions     No Clinical Screening - See Comments      rylee       Patient Story: Hx of cyclical vomiting  Focused Assessment:  Alert and oriented. Hx of autism, mother is healthcare agent. 20 G IV in L arm. This is his third visit this week.    Labs Ordered and Resulted from Time of ED Arrival Up to the Time of Departure from the ED   BASIC METABOLIC PANEL   CBC WITH PLATELETS DIFFERENTIAL     No orders to display       Treatments and/or interventions provided: Zofran, ativan, NS  Patient's response to treatments and/or interventions: tolerated well    To be done/followed up on inpatient unit:  Continue with plan of care per admitting MD.    Does this patient have any cognitive concerns?: Autism    Activity level - Baseline/Home:  Independent  Activity Level - Current:   Stand with Assist    Patient's Preferred language: English   Needed?: No    Isolation: None  Infection: Not Applicable  Bariatric?: No    Vital Signs:   Vitals:    05/13/20 1114   BP: (!) 146/79   Resp: 16   Temp: 97.8  F (36.6  C)   TempSrc: Oral   SpO2: 98%   Weight: 99.8 kg (220 lb)   Height: 1.956 m (6' 5\")       Cardiac Rhythm:     Was the PSS-3 completed:   Yes  What interventions are required if any?               Family Comments: Mother at bedside  OBS brochure/video discussed/provided to patient/family: Yes                For the majority of the shift this patient's behavior was Green.     ED NURSE PHONE NUMBER: *[id number]         "

## 2020-05-13 NOTE — ED PROVIDER NOTES
History     Chief Complaint:  Nausea, Vomiting, and Abdominal Pain    The history is provided by the patient and a parent. The history is limited by a developmental delay.      Ravi Dunne is a 29 year old male with a history of Autism Spectrum Disorder, cyclic vomiting syndrome, hyperlipidemia, among others, who presents with nausea, vomiting, and abdominal pain. Per chart review, the patient had been evaluated twice in the emergency department yesterday for nausea and vomiting with some abdominal pain. He had been given interventions in the ED during his first visit which improved symptoms and he was prescribed Zofran, however, the symptoms returned later in the day prompting their re-visit. During the second visit, the patient had an abdominal CT as well as an urinalysis and blood work obtained, all of which were normal and he was again discharged home but with an additional prescription for Reglan. Today, the patient's mother reports that he has continued to experience nausea and vomiting at home. He has taken the Zofran, that he was prescribed without relief. No antemetic medication today.  He had a virtual visit with his primary care, [facility], who recommended that he be re-evaluated in the emergency department for his symptoms. She states that he appears visibly upset with abdominal pain and this is abnormal for him. She denies cough, fever, or diarrhea.     CT ABDOMEN PELVIS WITH CONTRAST 5/12/2020 9:45 PM  IMPRESSION:   1.  Unremarkable CT of the abdomen and pelvis. No cause for pain  demonstrated.  SARI DOYLE MD    CMP: glucose 102 (H) o/w WNL (Creatinine 1.01)  CBC: WBC 8.2, HGB 14.8,    Lipase: 136      UA: ketones 10, mucous present, o/w negative     Allergies:  No known drug allergies.       Medications:    Zyrtec   Lexapro   Synthroid/Levothroid   metoclopramide   omeprazole  Zofran  Seroquel   Reglan   Trileptal     Past Medical History:    Anxiety   Autism   Autoimmune  "hypothyroidism   Cyclic vomiting syndrome   Development delay   Hyperlipidemia   Mood disorder      Past Surgical History:    Past surgical history reviewed. No pertinent past surgical history.     Family History:    Father: alcoholism, throat cancer  Brother: Autism     Social History:  The patient was accompanied to the ED by mother.  Smoking Status: Never Smoker  Smokeless Tobacco: Never Used  Alcohol Use: Negative   Drug Use: Negative  PCP: Bhaskar Gandara   Marital Status:  Single      Review of Systems   Constitutional: Negative for fever.   Respiratory: Negative for cough.    Gastrointestinal: Positive for abdominal pain, nausea and vomiting. Negative for diarrhea.   All other systems reviewed and are negative.    Physical Exam     Patient Vitals for the past 24 hrs:   BP Temp Temp src Heart Rate Resp SpO2 Height Weight   05/13/20 1114 (!) 146/79 97.8  F (36.6  C) Oral 73 16 98 % 1.956 m (6' 5\") 99.8 kg (220 lb)      Physical Exam  General: Alert, interactive. No distress.   Head:  Scalp is atraumatic.  Eyes:  EOM intact. The pupils are equal, round, and reactive to light. No scleral icterus.   ENT:                                      Ears:  The external ears are normal.   Nose:  The external nose is normal.  Throat:  The oropharynx is normal. Mucus membranes are dry.                 Neck:  Normal range of motion. There is no rigidity.   CV:  Regular rate and rhythm. No murmur. 2+ radial pulses  Resp:  Breath sounds are clear bilaterally. Non-labored, no retractions or accessory muscle use.  GI:  Abdomen is soft, no distension, no tenderness.   MS:  Normal range of motion.   Skin:  Warm and dry.   Neuro:  Strength and sensation grossly intact.   Psych:  Awake. Alert.  Appropriate interactions.     Emergency Department Course     Laboratory:  Laboratory findings were communicated with the patient and mother who voiced understanding of the findings.    CBC: WBC 7.9, HGB 14.5,   BMP: Glucose 100 " (H), o/w WNL (Creatinine 1.03)    Procedures:    Interventions:  1132 NS 1000 mL IV  1134 Zofran 4 mg IV  1135 Ativan 1 mg IV   1228 Reglan 10 mg IV    1329 NS 1000 mL IV     Emergency Department Course:    1113 Nursing notes and vitals reviewed. I performed an exam of the patient as documented above.     1124 IV was inserted and blood was drawn for laboratory testing, results above.     1252 I spoke with Dr. Santo of the hospitalist service from [facility] regarding patient's presentation, findings, and plan of care.     1253 Patient rechecked and updated. The patient feels better and wants to go home. Plan for PO challenge.     1315 Patient rechecked and updated.  The patient continues to feel improved, passed PO challenge. Is requesting to still go home. Will plan for additional 1 L of IV fluids then discharge to home with mother.      Prior to discharge, I personally reviewed the results with the patient and mother and all related questions were answered. The patient and mother verbalized understanding and is amenable to plan.     Impression & Plan      Medical Decision Making:  Ravi Dunne is a 29 year old male with a medical history including autism, anxiety, and cyclic vomiting, who presents to the emergency department today for evaluation of persistent intractable vomiting. The patient has been evaluated in the emergency department 2 times over the past 24 hours with similar symptoms. He has been unable to tolerate PO, though no antiemetics given prior to arrival.  Mother reports that today he was complaining of abdominal pain prompting her return to  emergency department. On exam, the patient has dry mucous membranes. Resting comfortably without localized abdominal tenderness. He had a CT scan yesterday that was unremarkable without acute findings.     Differential diagnosis includes cyclic vomiting syndrome, gastroenteritis, bowel obstruction, food poisoning, among others.  Lab work remains  unremarkable with no electrolyte derangements.  Patient was given the above interventions and on recheck felt improved. Initially, we planned on admission given the patient's repeat emergency department visits, though the patient wished to go home. He was able to tolerate p.o. and repeat abdominal exam remained benign without localized tenderness. I feel outpatient management is reasonable especially given patient did not try any oral antiemetics PTA so really did not fail outpatient management at this time. I recommended continuing Zofran or Reglan as needed for nausea/vomiting, drink plenty of fluids, and advance diet as tolerated.  Patient and mother agree with this plan and all questions and concerns addressed prior to discharge home. Return precautions outlined.       Diagnosis:    ICD-10-CM    1. Non-intractable vomiting with nausea, unspecified vomiting type  R11.2    2. Abdominal pain, generalized  R10.84        Disposition:   The patient is discharged to home.     Scribe Disclosure:  I, Orla Severson, am serving as a scribe at 11:15 AM on 5/13/2020 to document services personally performed by Karen Borrero PA-C based on my observations and the provider's statements to me.    EMERGENCY DEPARTMENT       Karen Borrero PA-C  05/13/20 0080

## 2020-05-14 ENCOUNTER — PATIENT OUTREACH (OUTPATIENT)
Dept: CARE COORDINATION | Facility: CLINIC | Age: 30
End: 2020-05-14

## 2020-05-14 VITALS
WEIGHT: 220 LBS | TEMPERATURE: 97.3 F | HEIGHT: 76 IN | SYSTOLIC BLOOD PRESSURE: 138 MMHG | OXYGEN SATURATION: 98 % | HEART RATE: 86 BPM | DIASTOLIC BLOOD PRESSURE: 87 MMHG | BODY MASS INDEX: 26.79 KG/M2 | RESPIRATION RATE: 18 BRPM

## 2020-05-14 PROCEDURE — 25800030 ZZH RX IP 258 OP 636: Performed by: HOSPITALIST

## 2020-05-14 PROCEDURE — 99217 ZZC OBSERVATION CARE DISCHARGE: CPT | Performed by: PHYSICIAN ASSISTANT

## 2020-05-14 PROCEDURE — G0378 HOSPITAL OBSERVATION PER HR: HCPCS

## 2020-05-14 PROCEDURE — 25000132 ZZH RX MED GY IP 250 OP 250 PS 637: Performed by: PHYSICIAN ASSISTANT

## 2020-05-14 PROCEDURE — 25000132 ZZH RX MED GY IP 250 OP 250 PS 637: Performed by: HOSPITALIST

## 2020-05-14 PROCEDURE — 96376 TX/PRO/DX INJ SAME DRUG ADON: CPT

## 2020-05-14 PROCEDURE — 25000128 H RX IP 250 OP 636: Performed by: HOSPITALIST

## 2020-05-14 RX ORDER — SENNOSIDES 8.6 MG
1-2 TABLET ORAL 2 TIMES DAILY
Status: DISCONTINUED | OUTPATIENT
Start: 2020-05-14 | End: 2020-05-14 | Stop reason: HOSPADM

## 2020-05-14 RX ORDER — OLANZAPINE 5 MG/1
5 TABLET, ORALLY DISINTEGRATING ORAL EVERY 6 HOURS PRN
Status: DISCONTINUED | OUTPATIENT
Start: 2020-05-14 | End: 2020-05-14 | Stop reason: HOSPADM

## 2020-05-14 RX ORDER — BISACODYL 5 MG
5 TABLET, DELAYED RELEASE (ENTERIC COATED) ORAL DAILY PRN
Status: DISCONTINUED | OUTPATIENT
Start: 2020-05-14 | End: 2020-05-14 | Stop reason: HOSPADM

## 2020-05-14 RX ADMIN — SENNOSIDES 1 TABLET: 8.6 TABLET, FILM COATED ORAL at 13:49

## 2020-05-14 RX ADMIN — ONDANSETRON 4 MG: 2 INJECTION INTRAMUSCULAR; INTRAVENOUS at 04:13

## 2020-05-14 RX ADMIN — LEVOTHYROXINE SODIUM 125 MCG: 125 TABLET ORAL at 06:39

## 2020-05-14 RX ADMIN — ONDANSETRON 4 MG: 4 TABLET, ORALLY DISINTEGRATING ORAL at 09:22

## 2020-05-14 RX ADMIN — CETIRIZINE HYDROCHLORIDE 10 MG: 10 TABLET, FILM COATED ORAL at 09:17

## 2020-05-14 RX ADMIN — OMEPRAZOLE 20 MG: 20 CAPSULE, DELAYED RELEASE ORAL at 09:17

## 2020-05-14 RX ADMIN — SODIUM CHLORIDE: 9 INJECTION, SOLUTION INTRAVENOUS at 06:39

## 2020-05-14 RX ADMIN — QUETIAPINE 25 MG: 25 TABLET ORAL at 15:05

## 2020-05-14 RX ADMIN — BISACODYL 5 MG: 5 TABLET, COATED ORAL at 13:50

## 2020-05-14 NOTE — H&P
[facility]    History and Physical  Hospitalist       Date of Admission:  5/13/2020    Assessment & Plan   Ravi Dunne is a 29 year old male with a history of autism, developmental delay, mood disorder, anxiety, and hypothyroidism who presented to the ER with nausea and vomiting.  This is his fourth visit to the ER over the past 2 days for the same problem.  He is being brought into the hospital for further evaluation and management.    Cyclic vomiting syndrome  - This is his fourth visit to the ER in the past two days.  - Symptoms are not improving.  - CT abdomen/pelvis shows no acute findings.  - Labs unremarkable.  - UA shows no signs of infection.  - Vitals OK in the ER, except mildly elevated blood pressure.  - [place] to observation.  - IV fluids.  - As needed nausea medications.  - Will try a dose of sumatriptan for the cyclic vomiting syndrome.    Autism  Developmental delay  Mood disorder  Anxiety  - Continue PTA medications as able.  - Plan of care discussed with his mom in the ER today.    Hypothyroidism  - Resume PTA levothyroxine.    DVT Prophylaxis: Low Risk/Ambulatory with no VTE prophylaxis indicated  Code Status: Full Code  Expected discharge: 1 to 2 days pending improvement in his symptoms.    Bubba Mccann MD    Primary Care Physician   Bhaskar Gandara    Chief Complaint   Nausea and vomiting    History is obtained from the patient's parent(s)    History of Present Illness   Ravi Dunne is a 29 year old male with a history of autism, developmental delay, mood disorder, anxiety, and hypothyroidism who presented to the ER with nausea and vomiting.  He has autism and developmental delay and his mother provides most of the history.  Over the past 2 days he has had nausea, vomiting, and some intermittent abdominal discomfort.  He has come into the ER 4 times for this now over the past 2 days.  CT scan, lab work, and urinalysis all looked okay.  He has a history of  cyclic vomiting syndrome and it was presumed that his symptoms were due to this.  He has been taking some Zofran without relief at home.  They were offered admission earlier today, however he wanted to go home.  He has continued to have symptoms and they subsequently came back to the ER this evening.  Vitals were okay in the ER.  Lab work and imaging were not repeated this evening.  Due to his persistent symptoms, it was felt that he should come to the hospital for observation.    There has been no obvious history of fevers, chills, or sweats.  No diarrhea.  No chest pain, shortness of breath, or coughing.  No urinary symptoms, although he does state his abdomen hurts somewhat when he goes to the bathroom, although it is difficult to obtain details.  He has not had any recent changes with his medications.  He has no recent sick contacts.  Mother denies any obvious food source of possible nausea and vomiting.  They have had some food from a food pantry recently, however the patient's brother and his mother have both been eating the same food and they have not been sick at all.  No recent travel.  They have basically been quarantining at home since the beginning of March.    Past Medical History    I have reviewed this patient's medical history and updated it with pertinent information if needed.   Past Medical History:   Diagnosis Date     Anxiety      Autism      Autoimmune hypothyroidism 02/14/2017    positive anti-thyroid antibodies     Cyclic vomiting syndrome      Development delay      Hyperlipidemia 2008    elevated triglycerides 209-271     Mood disorder (H)      Past Surgical History   I have reviewed this patient's surgical history and updated it with pertinent information if needed.  Past Surgical History:   Procedure Laterality Date     NO HISTORY OF SURGERY       Prior to Admission Medications   Prior to Admission Medications   Prescriptions Last Dose Informant Patient Reported? Taking?   OXcarbazepine  (TRILEPTAL) 300 MG tablet 5/12/2020 at Unknown time Mother Yes Yes   Sig: Take 300 mg by mouth At Bedtime Takes in addition to 600 mg tab = 900 mg total at bedtime   OXcarbazepine (TRILEPTAL) 600 MG tablet 5/12/2020 at Unknown time Mother Yes Yes   Sig: Take 600 mg by mouth At Bedtime Takes in addition to 300 mg tab = 900 mg total at bedtime   QUEtiapine (SEROQUEL) 25 MG tablet 5/12/2020 at Unknown time Mother Yes Yes   Sig: Take 25 mg by mouth 2 times daily At 3 pm and 6 pm   QUEtiapine (SEROQUEL) 50 MG tablet 5/12/2020 at Unknown time Mother Yes Yes   Sig: Take 50 mg by mouth At Bedtime    cetirizine (ZYRTEC) 10 MG tablet 5/12/2020 at Unknown time Mother No Yes   Sig: Take 1 tablet (10 mg) by mouth daily   escitalopram (LEXAPRO) 20 MG tablet 5/12/2020 at Unknown time Mother Yes Yes   Sig: Take 1 tablet (20 mg) by mouth At Bedtime   levothyroxine (SYNTHROID/LEVOTHROID) 125 MCG tablet 5/12/2020 at Unknown time Mother No Yes   Sig: Take 1 tablet (125 mcg) by mouth daily   metoclopramide (REGLAN) 5 MG tablet 5/13/2020 at Unknown time Mother No Yes   Sig: Take 1 tablet (5 mg) by mouth 3 times daily as needed (nausea/vomiting) As needed for nausea/vomiting   omeprazole (PRILOSEC) 20 MG DR capsule 5/12/2020 at Unknown time Mother No Yes   Sig: Take 1 capsule (20 mg) by mouth daily   ondansetron (ZOFRAN ODT) 4 MG ODT tab 5/13/2020 at Unknown time Mother No Yes   Sig: Take 1 tablet (4 mg) by mouth every 8 hours as needed for nausea      Facility-Administered Medications: None     Allergies   Allergies   Allergen Reactions     No Clinical Screening - See Comments      granola     Social History   I have reviewed this patient's social history and updated it with pertinent information if needed. Ravi Dunne  reports that he has never smoked. He has never used smokeless tobacco. He reports that he does not drink alcohol or use drugs.    Family History   I have reviewed this patient's family history and updated it with  pertinent information if needed.   Family History   Problem Relation Age of Onset     Alcoholism Father      Cancer Father 50        throat cancer from smoking; in remission with chemo and surgery     Autism Spectrum Disorder Brother      Review of Systems   Review of systems not obtained due to patient factors - mental status    Physical Exam   Temp: 97.6  F (36.4  C) Temp src: Oral BP: (!) 151/90 Pulse: 63 Heart Rate: 63 Resp: 18 SpO2: 98 % O2 Device: None (Room air)    Vital Signs with Ranges  Temp:  [97.6  F (36.4  C)-98.7  F (37.1  C)] 97.6  F (36.4  C)  Pulse:  [63] 63  Heart Rate:  [63-73] 63  Resp:  [16-25] 18  BP: (139-151)/(79-95) 151/90  SpO2:  [98 %] 98 %  220 lbs 0 oz    Constitutional: awake, alert, cooperative, sitting up in the ER bed, occasionally grimaces in apparent pain and grabs his abdomen  Eyes: sclera clear, conjunctiva normal  ENT: oral pharynx with dry mucous membranes  Respiratory: clear to auscultation bilaterally, no crackles or wheezing  Cardiovascular: regular rate and rhythm, normal S1 and S2, no murmur noted  GI: normal bowel sounds, soft, non-distended, mild generalized tenderness  Skin: warm, dry  Musculoskeletal: no lower extremity pitting edema present  Neurologic: awake, alert, moves all extremities    Data   Data reviewed today:  I personally reviewed no images or EKG's today.    Recent Labs   Lab 05/13/20  1124 05/12/20 2008   WBC 7.9 8.2   HGB 14.5 14.8   MCV 85 85    215    141   POTASSIUM 4.0 4.3   CHLORIDE 108 108   CO2 29 25   BUN 9 9   CR 1.03 1.01   ANIONGAP 4 8   YULIYA 9.3 9.1   * 102*   ALBUMIN  --  4.3   PROTTOTAL  --  7.7   BILITOTAL  --  0.9   ALKPHOS  --  78   ALT  --  41   AST  --  40   LIPASE  --  136     Recent Results (from the past 24 hour(s))   CT Abdomen Pelvis w Contrast    Narrative    CT ABDOMEN PELVIS WITH CONTRAST 5/12/2020 9:45 PM    CLINICAL HISTORY: Abdominal pain and vomiting    TECHNIQUE: CT scan of the abdomen and pelvis was  performed following  injection of IV contrast. Multiplanar reformats were obtained. Dose  reduction techniques were used.  CONTRAST: 111 mL Isovue-370    COMPARISON: 4/2/2013.    FINDINGS:   LOWER CHEST: Normal.    HEPATOBILIARY: Normal.    PANCREAS: Normal.    SPLEEN: Normal.    ADRENAL GLANDS: Normal.    KIDNEYS/BLADDER: Simple cyst in the mid left kidney is unchanged from  2013 and requires no further follow-up.    BOWEL: Normal appendix. No bowel obstruction or free intraperitoneal  air.    PELVIC ORGANS: Normal.    ADDITIONAL FINDINGS: None.    MUSCULOSKELETAL: Normal.      Impression    IMPRESSION:   1.  Unremarkable CT of the abdomen and pelvis. No cause for pain  demonstrated.    SARI DOYLE MD

## 2020-05-14 NOTE — PROGRESS NOTES
DATE & TIME: 5/13 night shift    Cognitive Concerns/ Orientation : A&Ox4- pt developmentally delayed at baseline. Mother in room assisting patient.   BEHAVIOR & AGGRESSION TOOL COLOR: green   ABNL VS/O2: VSS on RA ex mild HTN  MOBILITY: independent in room with mother   PAIN MANAGMENT: denies   DIET: clear liquids- patient ate 1 jello, tolerating.   BOWEL/BLADDER: continent of B/B- 1 BM on shift   DRAIN/DEVICES: PIV infusing NS @ 100 ml/hr  SKIN: intact   TESTS/PROCEDURES: na  D/C DAY/GOALS/PLACE: 1-2 days pending improvement of symptoms per MD note   OTHER IMPORTANT INFO: zofran given x1 for nausea- per pt nausea is improving. A couple of dry heaving episodes during shift- no emesis.

## 2020-05-14 NOTE — PLAN OF CARE
DATE & TIME: 5/14/2020 8300-8707   Cognitive Concerns/ Orientation : A&Ox4- pt developmentally delayed at baseline. Mother in room assisting patient.   BEHAVIOR & AGGRESSION TOOL COLOR: green   ABNL VS/O2: VSS on RA ex mild HTN  MOBILITY: independent in room with mother   PAIN MANAGMENT: abdominal pain, reports nausea and dry heaving, zofran given x1, patient stated decrease in nausea/dry heaving.   DIET: full liquids- patient ate 2 jello and cream soup, tolerating.   BOWEL/BLADDER: continent of B/B- 2 small & hard BMs per patient report. Stool softener ordered.   DRAIN/DEVICES: PIV SL  SKIN: intact   TESTS/PROCEDURES: na  D/C DAY/GOALS/PLACE: possibly this afternoon or tomorrow, hospitalist to reassess this afternoon.   OTHER IMPORTANT INFO: fluids discontinued.

## 2020-05-14 NOTE — PLAN OF CARE
DATE & TIME: 5/14/2020 1675-8019  Cognitive Concerns/ Orientation : A&Ox4- pt developmentally delayed at baseline. Mother in room assisting patient.   BEHAVIOR & AGGRESSION TOOL COLOR: green              ABNL VS/O2: VSS on RA, ex mild HTN; trending down.   MOBILITY: independent in room with mother   PAIN MANAGMENT: Denies any pain or N/V.   DIET: full liquids- tolerating. Will be on full liquid diet initially at home after discharge.   BOWEL/BLADDER: continent of B/B  DRAIN/DEVICES: PIV SL- removed prior to discharge.   SKIN: intact, no concerns.   TESTS/PROCEDURES: NA  D/C DAY/GOALS/PLACE: This afternoon. Discharge order in.   OTHER IMPORTANT INFO: fluids discontinued.

## 2020-05-14 NOTE — ED NOTES
"[facility]  ED Nurse Handoff Report    ED Chief complaint: Abdominal Pain      ED Diagnosis:   Final diagnoses:   Intractable nausea and vomiting       Code Status: Discussupon admission    Allergies:   Allergies   Allergen Reactions     No Clinical Screening - See Comments      rylee       Patient Story: 3rd ER visit in last 24 hours for abd pain and nausea  Focused Assessment:  Dryheaving    Treatments and/or interventions provided: Compazine  Patient's response to treatments and/or interventions: Reduction in nausea    To be done/followed up on inpatient unit:  See inpatient orders    Does this patient have any cognitive concerns?: Developmentally delay    Activity level - Baseline/Home:  Independent  Activity Level - Current:   Independent    Patient's Preferred language: English   Needed?: No    Isolation: None  Infection: Not Applicable  Bariatric?: No    Vital Signs:   Vitals:    05/13/20 1829 05/13/20 1832   BP: (!) 142/95    Resp: 16    Temp:  98.7  F (37.1  C)   TempSrc:  Oral   SpO2: 98%    Weight: 99.8 kg (220 lb)    Height: 1.93 m (6' 4\")        Cardiac Rhythm:     Was the PSS-3 completed:   Yes  What interventions are required if any?               Family Comments: Mother present  OBS brochure/video discussed/provided to patient/family: No            For the majority of the shift this patient's behavior was Green.   Behavioral interventions performed were None .    ED NURSE PHONE NUMBER: [phone]         "

## 2020-05-14 NOTE — PROGRESS NOTES
Discharge    Patient discharged to home via car with mother.   Care plan note done and completed.     Listed belongings gathered and returned to patient. Yes  Care Plan and Patient education resolved: Yes  Prescriptions if needed, hard copies sent with patient  NA  Home and hospital acquired medications returned to patient: NA  Medication Bin checked and emptied on discharge Yes  Follow up appointment made for patient: Instructions given to follow up with PCP as needed per AVS instructions.     Writer reviewed all discharge instructions with pt and mother at bedside. Mother verbalizes understanding and states no questions at this time. Pt is ambulating and denies any pain, nausea or vomiting at time of discharge. Pt and mother escorted to door 6 to get car from [facility] with transport aide from station 66.

## 2020-05-14 NOTE — PROGRESS NOTES
"[facility]    Medicine Progress Note - Hospitalist Service       Date of Admission:  5/13/2020  Assessment & Plan   Ravi Dunne is a 29 year old male with a history of autism, developmental delay, mood disorder, anxiety, and hypothyroidism who presented to the ER with nausea and vomiting.  This is his fourth visit to the ER over the past 2 days for the same problem.  He is being brought into the hospital for further evaluation and management.     Cyclic vomiting syndrome, recurrent - improving  4th visit to the ER in the past two days with complaints of \"bellyache\".  Had a noninfectious appearing UA as well as CT scan with contrast of abdomen and pelvis which was unrevealing other than a stable renal cyst.  Labs unremarkable. Last emesis day of admission. Urinating frequently with IVF.   - Registered to observation  - IV fluids until tolerating more PO  - PRN antiemetics  - N/V relieved by abortive medication, sumatriptan SQ x1, for the cyclic vomiting syndrome.  - Bowel regimen for presumed constipation with 2 hard stools and belly ache     Autism  Developmental delay  Mood disorder  Anxiety  - Continue PTA meds as able.  - Plan of care discussed with his mom in the ER today.     Hypothyroidism: Resume PTA levothyroxine.    Diet: Full Liquid Diet    DVT Prophylaxis: Low Risk/Ambulatory with no VTE prophylaxis indicated and Ambulate every shift  Marques Catheter: not present  Code Status: Full Code      Disposition Plan   Expected discharge: today or tomorrow, recommended to prior living arrangement once nausea and abd discomfort improved, tolearting diet advancement and mom comfortable with discharge.  Entered: Lillian Pedersen PA-C 05/14/2020, 1:58 PM       The patient's care was discussed with the Attending Physician, Dr. Hussein, Bedside Nurse, Patient and Patient's Family.    Lillian Tinoco)DANETTE  Hospitalist Service  [facility] " "Hospital    ______________________________________________________________________    Interval History   Seen and examined. No vomiting today. C/o \"bellyache\" but states this is improving. Peeing frequently with IVF in place. Last emesis last night, good relief with sumatriptan. No lightheadedness or dizziness. No SOB. No blood in urine. 2 small hard stools today, constipation could be contributing.    Data reviewed today: I reviewed all medications, new labs and imaging results over the last 24 hours. I personally reviewed no images or EKG's today.    Physical Exam   Vital Signs: Temp: 98.1  F (36.7  C) Temp src: Oral BP: (!) 142/86 Pulse: 86 Heart Rate: 71 Resp: 18 SpO2: 98 % O2 Device: None (Room air)    Weight: 220 lbs 0 oz    General: Awake, alert, young developmentally delayed gentleman who appears stated age. Looks comfortable sitting in bed and ambulating room during part of visit. No acute distress.  HEENT: Normocephalic, atraumatic. Extraocular movements intact.   Respiratory: Clear to auscultation bilaterally, no rales, wheezing, or rhonchi.  Cardiovascular: Regular rate and rhythm, +S1 and S2, no murmur auscultated. No peripheral edema.   Gastrointestinal: Soft, non-tender, non-distended. Bowel sounds present. No peritoneal signs. No rebound. No flank pain.   Skin: Warm, dry. No obvious rashes or lesions on exposed skin. Dorsalis pedis pulses palpable bilaterally.  Musculoskeletal: No joint swelling, erythema or tenderness. Moves all extremities equally.  Neurologic: AAO x3. Cranial nerves 2-12 grossly intact, normal strength and sensation.  Psychiatric: Appropriate mood and affect. No obvious anxiety or depression.    Data   Recent Labs   Lab 05/13/20  1124 05/12/20 2008   WBC 7.9 8.2   HGB 14.5 14.8   MCV 85 85    215    141   POTASSIUM 4.0 4.3   CHLORIDE 108 108   CO2 29 25   BUN 9 9   CR 1.03 1.01   ANIONGAP 4 8   YULIYA 9.3 9.1   * 102*   ALBUMIN  --  4.3   PROTTOTAL  --  7.7 "   BILITOTAL  --  0.9   ALKPHOS  --  78   ALT  --  41   AST  --  40   LIPASE  --  136     No results found for this or any previous visit (from the past 24 hour(s)).  Medications       cetirizine  10 mg Oral Daily     escitalopram  20 mg Oral At Bedtime     levothyroxine  125 mcg Oral QAM AC     omeprazole  20 mg Oral Daily     OXcarbazepine  900 mg Oral At Bedtime     QUEtiapine  25 mg Oral BID     QUEtiapine  50 mg Oral At Bedtime     sennosides  1-2 tablet Oral BID     sodium chloride (PF)  3 mL Intracatheter Q8H

## 2020-05-14 NOTE — PHARMACY-ADMISSION MEDICATION HISTORY
Pharmacy Medication History  Admission medication history interview status for the 5/13/2020  admission is complete. See EPIC admission navigator for prior to admission medications     Medication history sources: Patient's family/friend (mom)  Medication history source reliability: Good  Adherence assessment: Good    Significant changes made to the medication list:         Additional medication history information:        Medication reconciliation completed by provider prior to medication history? No    Time spent in this activity: 15min      Prior to Admission medications    Medication Sig Last Dose Taking? Auth Provider   cetirizine (ZYRTEC) 10 MG tablet Take 1 tablet (10 mg) by mouth daily 5/12/2020 at Unknown time Yes Bhaskar Gandara MD   escitalopram (LEXAPRO) 20 MG tablet Take 1 tablet (20 mg) by mouth At Bedtime 5/12/2020 at Unknown time Yes Bhaskar Gandara MD   levothyroxine (SYNTHROID/LEVOTHROID) 125 MCG tablet Take 1 tablet (125 mcg) by mouth daily 5/12/2020 at Unknown time Yes Bhaskar Gandara MD   metoclopramide (REGLAN) 5 MG tablet Take 1 tablet (5 mg) by mouth 3 times daily as needed (nausea/vomiting) As needed for nausea/vomiting 5/13/2020 at Unknown time Yes Wenceslao Lieberman MD   omeprazole (PRILOSEC) 20 MG DR capsule Take 1 capsule (20 mg) by mouth daily 5/12/2020 at Unknown time Yes Bhaskar Gandara MD   ondansetron (ZOFRAN ODT) 4 MG ODT tab Take 1 tablet (4 mg) by mouth every 8 hours as needed for nausea 5/13/2020 at Unknown time Yes Trierweiler, Chad A, MD   OXcarbazepine (TRILEPTAL) 300 MG tablet Take 300 mg by mouth At Bedtime Takes in addition to 600 mg tab = 900 mg total at bedtime 5/12/2020 at Unknown time Yes Bhaskar Gandara MD   OXcarbazepine (TRILEPTAL) 600 MG tablet Take 600 mg by mouth At Bedtime Takes in addition to 300 mg tab = 900 mg total at bedtime 5/12/2020 at Unknown time Yes Unknown, Entered By History   QUEtiapine (SEROQUEL) 25  MG tablet Take 25 mg by mouth 2 times daily At 3 pm and 6 pm 5/12/2020 at Unknown time Yes Bhaskar Gandara MD   QUEtiapine (SEROQUEL) 50 MG tablet Take 50 mg by mouth At Bedtime  5/12/2020 at Unknown time Yes Bhaskar Gandara MD

## 2020-05-14 NOTE — PROGRESS NOTES
RECEIVING UNIT ED HANDOFF REVIEW    ED Nurse Handoff Report was reviewed by: Herve Carreno RN on May 13, 2020 at 7:49 PM

## 2020-05-15 ENCOUNTER — VIRTUAL VISIT (OUTPATIENT)
Dept: INTERNAL MEDICINE | Facility: CLINIC | Age: 30
End: 2020-05-15
Payer: MEDICAID

## 2020-05-15 DIAGNOSIS — Z09 HOSPITAL DISCHARGE FOLLOW-UP: Primary | ICD-10-CM

## 2020-05-15 DIAGNOSIS — R30.0 DYSURIA: ICD-10-CM

## 2020-05-15 LAB
ALBUMIN UR-MCNC: NEGATIVE MG/DL
APPEARANCE UR: CLEAR
BACTERIA #/AREA URNS HPF: ABNORMAL /HPF
BILIRUB UR QL STRIP: NEGATIVE
CAOX CRY #/AREA URNS HPF: ABNORMAL /HPF
COLOR UR AUTO: YELLOW
GLUCOSE UR STRIP-MCNC: NEGATIVE MG/DL
HGB UR QL STRIP: NEGATIVE
KETONES UR STRIP-MCNC: ABNORMAL MG/DL
LEUKOCYTE ESTERASE UR QL STRIP: NEGATIVE
NITRATE UR QL: NEGATIVE
NON-SQ EPI CELLS #/AREA URNS LPF: ABNORMAL /LPF
PH UR STRIP: 5.5 PH (ref 5–7)
RBC #/AREA URNS AUTO: ABNORMAL /HPF
SOURCE: ABNORMAL
SP GR UR STRIP: >1.03 (ref 1–1.03)
UROBILINOGEN UR STRIP-ACNC: 1 EU/DL (ref 0.2–1)
WBC #/AREA URNS AUTO: ABNORMAL /HPF

## 2020-05-15 PROCEDURE — 81001 URINALYSIS AUTO W/SCOPE: CPT | Performed by: PHYSICIAN ASSISTANT

## 2020-05-15 NOTE — PROGRESS NOTES
"Ravi Dunne is a 29 year old male who is being evaluated via a billable video visit.      The patient has been notified of following:     \"This video visit will be conducted via a call between you and your physician/provider. We have found that certain health care needs can be provided without the need for an in-person physical exam.  This service lets us provide the care you need with a video conversation.  If a prescription is necessary we can send it directly to your pharmacy.  If lab work is needed we can place an order for that and you can then stop by our lab to have the test done at a later time.    Video visits are billed at different rates depending on your insurance coverage.  Please reach out to your insurance provider with any questions.    If during the course of the call the physician/provider feels a video visit is not appropriate, you will not be charged for this service.\"    Patient has given verbal consent for Video visit? Yes    How would you like to obtain your AVS? Mail a copy    Patient would like the video invitation sent by: Text to cell phone: [phone]    Will anyone else be joining your video visit? No      Subjective     Ravi Dunne is a 29 year old male who presents today via video visit for the following health issues:    [facility]    Hospital Follow-up Visit:    Hospital/Nursing Home/IP Rehab Facility: [facility]  Date of Admission: 5/13/20  Date of Discharge: 5/14/20  Reason(s) for Admission: Nausea and vomiting       Was your hospitalization related to COVID-19? No   Problems taking medications regularly:  None  Medication changes since discharge: None  Problems adhering to non-medication therapy:  None    Summary of hospitalization:  [facility] discharge summary reviewed  Diagnostic Tests/Treatments reviewed.  Follow up needed: none  Other Healthcare Providers Involved in Patient s Care:         None  Update since discharge: improved.except symptoms " "noted as below   Post Discharge Medication Reconciliation: discharge medications reconciled, continue medications without change.  Plan of care communicated with patient and family              Genitourinary symptoms      Duration: Past couple of days     Description:  dysuria and frequency    Intensity:  moderate    Accompanying signs and symptoms (fever/discharge/nausea/vomiting/back or abdominal pain):   Diarrhea last night     History (frequent UTI's/kidney stones/prostate problems): None  Sexually active: no     Precipitating or alleviating factors: Patient is autistic and requesting to be seen in the office for an exam     Therapies tried and outcome:  Currently on soft diet         Video Start Time: 2:14        Reviewed and updated as needed this visit by Provider  Meds  Problems             Objective    There were no vitals taken for this visit.  Estimated body mass index is 26.78 kg/m  as calculated from the following:    Height as of 5/13/20: 1.93 m (6' 4\").    Weight as of 5/13/20: 99.8 kg (220 lb).  Physical Exam     GENERAL: Healthy, alert and no distress  EYES: Eyes grossly normal to inspection.  No discharge or erythema, or obvious scleral/conjunctival abnormalities.  RESP: No audible wheeze, cough, or visible cyanosis.  No visible retractions or increased work of breathing.    SKIN: Visible skin clear. No significant rash, abnormal pigmentation or lesions.  NEURO: Cranial nerves grossly intact.  Mentation and speech appropriate for age.  PSYCH: Mentation appears normal, affect normal/bright, judgement and insight intact, normal speech and appearance well-groomed.      Diagnostic Test Results:  Labs reviewed in Epic        Assessment & Plan     1. Hospital discharge follow-up  - improving abdominal pain and nausea  - will continue to monitor     2. Dysuria  - check UA, if normal and symptoms consider follow up with PCP or referral to urology   - UA with Microscopic reflex to Culture; Future       No " follow-ups on file.    Lidia Scales PA-C  [facility]      Video-Visit Details    Type of service:  Video Visit    Video End Time:2:30 PM    Originating Location (pt. Location): Home    Distant Location (provider location):  [facility]     Platform used for Video Visit: MAR Systems    No follow-ups on file.       Lidia Scales PA-C

## 2020-05-18 ENCOUNTER — PATIENT OUTREACH (OUTPATIENT)
Dept: CARE COORDINATION | Facility: CLINIC | Age: 30
End: 2020-05-18

## 2020-05-18 ASSESSMENT — ACTIVITIES OF DAILY LIVING (ADL): DEPENDENT_IADLS:: MEDICATION MANAGEMENT;TRANSPORTATION

## 2020-05-18 NOTE — LETTER
Health Care Home - Access Care Plan    About Me:    Patient Name:  Ravi Prasad    YOB: 1990  Age:                      29 year old   [place] MRN:     [id number] Telephone Information:   Home Phone [phone]   Mobile [phone]       Address:  [address]  [facility] [facility] [address] Email address:  No e-mail address on record      Emergency Contact(s)   Name Relationship Lgl Grd Work Phone Home Phone Mobile Phone   1. SHRAVAN PRASAD Mother  none [phone]   2. ASHLI PRASAD Grandparent  none [phone]             Health Maintenance: Routine Health maintenance Reviewed: Due/Overdue   Health Maintenance Due   Topic Date Due     PREVENTIVE CARE VISIT  02/14/2018     DTAP/TDAP/TD IMMUNIZATION (8 - Td) 04/23/2019     PHQ-2  01/01/2020     TSH W/FREE T4 REFLEX  04/03/2020     My Access Plan  Medical Emergency 911   Questions or concerns during clinic hours Primary Clinic Line, I will call the clinic directly: [facility] - [phone]   24 Hour Appointment Line [phone] or  [phone]) (toll free)   24 Hour Nurse Line [phone] (toll free)   Questions or concerns outside clinic hours 24 Hour Appointment Line, I will call the after-hours on-call line:   [facility] [phone] or [phone]) (toll-free)   Preferred Urgent Care [facility], [phone]   Preferred Hospital [facility]  [phone]   Preferred Pharmacy [id number] - [place], [place] - [address] AT [facility] [address]     Behavioral Health Crisis Line The National Suicide Prevention Lifeline at 1-420.711.3271 or 912       My Care Team Members  Patient Care Team       Relationship Specialty Notifications Start End    Bhaskar Gandara MD PCP - General Internal Medicine  3/30/11     Phone: [phone] Pager: [phone] Fax: [phone] [address] [place] [address]    Bhaskar Gandara MD Assigned PCP   4/21/13     Phone: [phone] Pager: [phone] Fax: [phone] [address] [place] [address]           My Medical and Care Information  Problem List   Patient Active Problem List   Diagnosis     Autism     Development delay     Anxiety     Hyperlipidemia LDL goal <160     Cyclic vomiting syndrome     Autoimmune hypothyroidism     Nausea and vomiting      Current Medications:  Current Outpatient Medications   Medication     cetirizine (ZYRTEC) 10 MG tablet     escitalopram (LEXAPRO) 20 MG tablet     levothyroxine (SYNTHROID/LEVOTHROID) 125 MCG tablet     metoclopramide (REGLAN) 5 MG tablet     omeprazole (PRILOSEC) 20 MG DR capsule     OXcarbazepine (TRILEPTAL) 300 MG tablet     OXcarbazepine (TRILEPTAL) 600 MG tablet     QUEtiapine (SEROQUEL) 25 MG tablet     QUEtiapine (SEROQUEL) 50 MG tablet     No current facility-administered medications for this visit.

## 2020-05-18 NOTE — PROGRESS NOTES
"Clinic Care Coordination Contact    Clinic Care Coordination Contact  OUTREACH    Referral Information:  Referral Source: IP Report  Primary Diagnosis: GI Disorders  Chief Complaint   Patient presents with     Clinic Care Coordination - Post Hospital     intractable nausea and vomiting      Universal Utilization: reviewed on this date  Clinic Utilization  Difficulty keeping appointments: No  Compliance Concerns: No  No-Show Concerns: No  No PCP office visit in Past Year: No  Utilization    Last refreshed: 5/18/2020  4:20 PM:  Hospital Admissions 1           Last refreshed: 5/18/2020  4:20 PM:  ED Visits 4           Last refreshed: 5/18/2020  4:20 PM:  No Show Count (past year) 0              Current as of: 5/18/2020  4:20 PM            CC RN outreach to patient/patient's mother following patient's recent ED visits (5/12/20 x 2, 5/13/20) and hospitalization 5/13/20 - 5/14/20 for intractable nausea and vomiting.    CC RN called and spoke with patient's mother; introduced self, discussed role of Care Coordination, and explained reason for call.    The patient's mother reported the patient has been doing quite a bit better today.  She stated he hasn't had diarrhea today, he's been eating well, they've been taking the dogs out for walks a couple times each day, and the patient spent time on the swing set today.    CC RN reviewed patient's recent virtual visit with MD at PCP clinic; the patient's mother stated the patient is still perseverating on the need to see his PCP Dr. Gandara and only wants to see him face-to-face.    The patient's mother stated the patient usually has PCA's and has other supports/services through his [facility], but right now they've been minimizing those due to COVID-19.  The patient's mother has been primarily managing the patient's care.  She stated this is going okay for the time-being and she is taking it \"one day at a time\".    Clinical Concerns:  Current Medical Concerns: cyclic vomiting " syndrome    Current Behavioral Concerns: autism, developmental delay, anxiety    Education Provided to patient: provided patient's mother with information about Care Coordination, reviewed hospital discharge AVS     Pain  Pain (GOAL): No    Medication Management:  Post-discharge medication reconciliation status: Discharge medications reviewed and reconciled on 5/15/20 for PCP visit.  Continue medications without change.    The patient's mother stated the patient is out of Zofran; she stated it has been effective in managing the patient's nausea; she requested a refill so they can have this on hand for continued use, if needed.    CC RN did encourage patient's mother to try using the Reglan, if needed; she stated understanding.  Discussed potentially decreasing frequency of anti-emetics to see how the patient is doing without them; she was agreeable.    Functional Status:  Dependent IADLs: Medication Management, Transportation  Bed or wheelchair confined: No  Mobility Status: Independent  Fallen 2 or more times in the past year?: No  Any fall with injury in the past year?: No    Living Situation:  Current living arrangement: I live in a private home with family    Lifestyle & Psychosocial Needs:  Diet: Regular  Inadequate nutrition (GOAL): No  Tube Feeding: No  Inadequate activity/exercise (GOAL): No  Significant changes in sleep pattern (GOAL): No  Transportation means: Family     Anabaptist or spiritual beliefs that impact treatment: No  Mental health DX: Yes  Mental health DX how managed: Medication  Mental health management concern (GOAL): No  Informal Support system: Parent   Socioeconomic History     Marital status: Single     Spouse name: Not on file     Number of children: Not on file     Years of education: Not on file     Highest education level: Not on file     Tobacco Use     Smoking status: Never Smoker     Smokeless tobacco: Never Used   Substance and Sexual Activity     Alcohol use: No     Drug use: No      Sexual activity: Never      Resources and Interventions:  Community Resources: County Programs, County Worker, PCA, Day Care  Supplies used at home: None  Equipment Currently Used at Home: none    Patient/Caregiver understanding: Yes    Plan:    CC RN will forward patient's mother's requests to PCP clinic triage team for assistance with:  __refill of Zofran, if appropriate  __schedule face-to-face visit with Dr. Gandara, if able, to discuss hospital follow-up and plan of care    The patient's mother will continue to monitor patient and his symptoms; she will promptly report any recurrent/worsening symptoms to clinic for review and advisement.    CC RN will send patient Care Coordination introduction letter and Access Plan.  The patient's mother agreed to contact CC RN with additional questions or concerns.    CC RN will make no further scheduled patient outreaches at this time but will remain available should any patient needs arise.    Jaime Walter, RN  Clinic Care Coordinator  [facility]  Ph: [phone]

## 2020-05-18 NOTE — LETTER
[facility] CARE COORDINATION  [facility]  [address] , [place] [address]    May 18, 2020    Ravi Dunne  [address]  [place] [address]      Dear Ravi,    I am a clinic care coordinator who works with Bhaskar Gandara MD at [facility]. I wanted to introduce myself and provide you with my contact information for you to be able to call me with any questions or concerns. Below is a description of clinic care coordination and how I can further assist you.      The clinic care coordination team is made up of a registered nurse,  and community health worker who understand the health care system. The goal of clinic care coordination is to help you manage your health and improve access to the health care system in the most efficient manner. The team can assist you in meeting your health care goals by providing education, coordinating services, strengthening the communication among your providers and supporting you with any resource needs.    Please feel free to contact me with any questions or concerns. We are focused on providing you with the highest-quality healthcare experience possible and that all starts with you.     Sincerely,     Jaime Walter RN  Clinic Care Coordinator  [facility]  Ph: [phone]    Enclosed: I have enclosed a copy of a 24 Hour Access Plan. This has helpful phone numbers for you to call when needed. Please keep this in an easy to access place to use as needed.

## 2020-05-19 DIAGNOSIS — R11.15 CYCLIC VOMITING SYNDROME: Primary | ICD-10-CM

## 2020-05-19 RX ORDER — ONDANSETRON 4 MG/1
4 TABLET, ORALLY DISINTEGRATING ORAL EVERY 8 HOURS PRN
Qty: 30 TABLET | Refills: 3 | Status: SHIPPED | OUTPATIENT
Start: 2020-05-19 | End: 2020-07-06

## 2020-05-19 NOTE — TELEPHONE ENCOUNTER
1) Refill requested for Zofran. Patient using 3x per day and seem's to be effective.    2) Patient also requesting to see Dr. Gandara for Hospital f/u face to face despite virtual follow up with partner.    Monica SAHAN, RN, PHN

## 2020-06-26 ENCOUNTER — HOSPITAL ENCOUNTER (EMERGENCY)
Facility: CLINIC | Age: 30
Discharge: HOME | End: 2020-06-26
Attending: EMERGENCY MEDICINE | Admitting: EMERGENCY MEDICINE
Payer: MEDICAID

## 2020-06-26 ENCOUNTER — NURSE TRIAGE (OUTPATIENT)
Dept: INTERNAL MEDICINE | Facility: CLINIC | Age: 30
End: 2020-06-26

## 2020-06-26 VITALS
SYSTOLIC BLOOD PRESSURE: 139 MMHG | TEMPERATURE: 98.6 F | BODY MASS INDEX: 25.39 KG/M2 | WEIGHT: 208.6 LBS | OXYGEN SATURATION: 97 % | HEART RATE: 82 BPM | DIASTOLIC BLOOD PRESSURE: 89 MMHG

## 2020-06-26 DIAGNOSIS — K64.4 EXTERNAL HEMORRHOIDS: ICD-10-CM

## 2020-06-26 DIAGNOSIS — K92.1 HEMATOCHEZIA: ICD-10-CM

## 2020-06-26 LAB
BASOPHILS # BLD AUTO: 0 10E9/L (ref 0–0.2)
BASOPHILS NFR BLD AUTO: 0.2 %
DIFFERENTIAL METHOD BLD: NORMAL
EOSINOPHIL # BLD AUTO: 0 10E9/L (ref 0–0.7)
EOSINOPHIL NFR BLD AUTO: 0.6 %
ERYTHROCYTE [DISTWIDTH] IN BLOOD BY AUTOMATED COUNT: 12.2 % (ref 10–15)
HCT VFR BLD AUTO: 42.1 % (ref 40–53)
HGB BLD-MCNC: 14.8 G/DL (ref 13.3–17.7)
IMM GRANULOCYTES # BLD: 0 10E9/L (ref 0–0.4)
IMM GRANULOCYTES NFR BLD: 0.2 %
LYMPHOCYTES # BLD AUTO: 1.4 10E9/L (ref 0.8–5.3)
LYMPHOCYTES NFR BLD AUTO: 20.9 %
MCH RBC QN AUTO: 29.2 PG (ref 26.5–33)
MCHC RBC AUTO-ENTMCNC: 35.2 G/DL (ref 31.5–36.5)
MCV RBC AUTO: 83 FL (ref 78–100)
MONOCYTES # BLD AUTO: 0.5 10E9/L (ref 0–1.3)
MONOCYTES NFR BLD AUTO: 6.8 %
NEUTROPHILS # BLD AUTO: 4.7 10E9/L (ref 1.6–8.3)
NEUTROPHILS NFR BLD AUTO: 71.3 %
NRBC # BLD AUTO: 0 10*3/UL
NRBC BLD AUTO-RTO: 0 /100
PLATELET # BLD AUTO: 184 10E9/L (ref 150–450)
RBC # BLD AUTO: 5.06 10E12/L (ref 4.4–5.9)
WBC # BLD AUTO: 6.6 10E9/L (ref 4–11)

## 2020-06-26 PROCEDURE — 85025 COMPLETE CBC W/AUTO DIFF WBC: CPT | Performed by: EMERGENCY MEDICINE

## 2020-06-26 PROCEDURE — 99283 EMERGENCY DEPT VISIT LOW MDM: CPT

## 2020-06-26 NOTE — ED AVS SNAPSHOT
Emergency Department  [address] [place] [address]  Phone:  [phone]  Fax:  [phone]                                    Ravi Dunne   MRN: [id number]    Department:   Emergency Department   Date of Visit:  6/26/2020           After Visit Summary Signature Page    I have received my discharge instructions, and my questions have been answered. I have discussed any challenges I see with this plan with the nurse or doctor.    ..........................................................................................................................................  Patient/Patient Representative Signature      ..........................................................................................................................................  Patient Representative Print Name and Relationship to Patient    ..................................................               ................................................  Date                                   Time    ..........................................................................................................................................  Reviewed by Signature/Title    ...................................................              ..............................................  Date                                               Time          22EPIC Rev 08/18

## 2020-06-26 NOTE — ED PROVIDER NOTES
History     Chief Complaint:  Rectal Bleeding    HPI   Ravi Dunne is a 29 year old male with a history of autism, and autoimmune hypothyroidism who presents to the emergency department for evaluation of rectal bleeding.  Patient took a shower today and his mother picked up his close in the bathroom and noted that there was dried blood in his underwear.  It had soaked through 2 layers of underwear.  She is unclear if he has had this trouble otherwise this past week or in the past.  He does suffer autism which makes history challenging.  The patient denies abdominal pain.  No nausea or vomiting.  No known history of hemorrhoids.  The patient does admit that his stools have been firm lately and somewhat painful to pass.  No fever chills or sweats.  He does not feel faint or lightheaded.  No back pain.  No difficulty urinating.  He is not anticoagulated.  He denies anal trauma.  The patient's mother adds he has lost approximately 20 pounds over the last month or so.  Maintaining his typical diet.  No other complaints.    Allergies:  No Clinical Screening  Sulfa Drugs     Medications:    Zyrtec  Lexapro  Synthroid  Reglan  Prilosec  Trilepta  Seroquel       Past Medical History:    Anxiety  Autism  Autoimmune hypothyroidism  Cyclic vomiting syndrome  Development delay  Hyperlipidemia  Mood disorder     Past Surgical History:    The patient does not have any pertinent past surgical history.    Family History:    Father: alcoholism, throat cancer  Brother: autism spectrum disorder     Social History:  Smoking Status: Never  Smokeless Tobacco: Never  Alcohol Use: No  Drug Use: No   Marital Status:  Single     Review of Systems  All systems otherwise negative or per HPI    Physical Exam   Vitals:  Patient Vitals for the past 24 hrs:   BP Temp Temp src Pulse SpO2 Weight   06/26/20 1035 139/89 98.6  F (37  C) Oral 82 97 % 94.6 kg (208 lb 9.6 oz)       Physical Exam   SKIN:  Warm, dry.  HEMATOLOGIC/IMMUNOLOGIC/LYMPHATIC:   No pallor.  EYES:  Conjunctivae normal.  CARDIOVASCULAR:  Regular rate and rhythm.  RESPIRATORY:  No respiratory distress, breath sounds equal and normal.  GASTROINTESTINAL:  Soft, nontender abdomen with active bowel sounds.  GENITOURINARY:  Perianal exam reveals a small hemorrhoid, approximate half centimeter in diameter, no erosion overlying the hemorrhoid.  No active bleeding from the hemorrhoid.  No apparent anal fissure.  MUSCULOSKELETAL: Normal body habitus.  NEUROLOGIC:  Alert, conversant.  PSYCHIATRIC:  Normal mood.    Emergency Department Course     Laboratory:  CBC: WNL. (WBC 6.6, HGB 14.8, )     Emergency Department Course:  Past medical records, nursing notes, and vitals reviewed.    1058 I performed an exam of the patient as documented above.     IV was inserted and blood was drawn for laboratory testing, results above.    1208 I rechecked the patient and discussed the results of his workup thus far.     Findings and plan explained to the Patient. Patient discharged home with instructions regarding supportive care, medications, and reasons to return. The importance of close follow-up was reviewed.     I personally reviewed the laboratory results with the Patient and answered all related questions prior to discharge.         Impression & Plan      Medical Decision Making:  This patient presents with his mother with concern about hematochezia.  Blood work was reassuring with respect to blood loss.  Examination did reveal a small hemorrhoid which could have been the bleeding source.  No evidence of anal fissure.  Apparently has been constipated lately which may have played a role in all of this.  I did not think he required further emergency testing but I advised primary care follow-up for reassessment.    Diagnosis:    ICD-10-CM    1. Hematochezia  K92.1    2. External hemorrhoids  K64.4        Disposition:  discharged to home    Discharge Medications:  None      Walter ALVARADO, am serving as a  scribe on 6/26/2020 at 10:56 AM to personally document services performed by Charly Sinclair MD based on my observations and the provider's statements to me.   Walter Melissa  6/26/2020    EMERGENCY DEPARTMENT       Charly Sinclair MD  06/26/20 0183

## 2020-06-26 NOTE — TELEPHONE ENCOUNTER
"  Reason for Disposition    MODERATE rectal bleeding (small blood clots, passing blood without stool, or toilet water turns red) more than once a day    Additional Information    Negative: Passed out (i.e., fainted, collapsed and was not responding)    Negative: Shock suspected (e.g., cold/pale/clammy skin, too weak to stand, low BP, rapid pulse)    Negative: Vomiting red blood or black (coffee ground) material    Negative: Sounds like a life-threatening emergency to the triager    Negative: Diarrhea is the main symptom    Negative: Rectal symptoms    Negative: SEVERE rectal bleeding (large blood clots; on and off, or constant bleeding)    Negative: SEVERE dizziness (e.g., unable to stand, requires support to walk, feels like passing out now)    Answer Assessment - Initial Assessment Questions  1. APPEARANCE of BLOOD: \"What color is it?\" \"Is it passed separately, on the surface of the stool, or mixed in with the stool?\"       Dried red blood per mother  2. AMOUNT: \"How much blood was passed?\"       unsure  3. FREQUENCY: \"How many times has blood been passed with the stools?\"       Once to knowledge of caregiver  4. ONSET: \"When was the blood first seen in the stools?\" (Days or weeks)       Blood on underwear this am noted  5. DIARRHEA: \"Is there also some diarrhea?\" If so, ask: \"How many diarrhea stools were passed in past 24 hours?\"       no  6. CONSTIPATION: \"Do you have constipation?\" If so, \"How bad is it?\"      No/unsure   7. RECURRENT SYMPTOMS: \"Have you had blood in your stools before?\" If so, ask: \"When was the last time?\" and \"What happened that time?\"       no  8. BLOOD THINNERS: \"Do you take any blood thinners?\" (e.g., Coumadin/warfarin, Pradaxa/dabigatran, aspirin)      no  9. OTHER SYMPTOMS: \"Do you have any other symptoms?\"  (e.g., abdominal pain, vomiting, dizziness, fever)      no  10. PREGNANCY: \"Is there any chance you are pregnant?\" \"When was your last menstrual period?\"        no    Protocols " used: RECTAL BLEEDING-A-[place]

## 2020-06-26 NOTE — ED TRIAGE NOTES
Rectal bleeding noted in pt's underwear by mother this morning. Pt is poor self- but states he has had this for maybe a few days.

## 2020-06-29 ENCOUNTER — PATIENT OUTREACH (OUTPATIENT)
Dept: NURSING | Facility: CLINIC | Age: 30
End: 2020-06-29
Payer: MEDICAID

## 2020-06-29 DIAGNOSIS — Z71.89 OTHER SPECIFIED COUNSELING: ICD-10-CM

## 2020-06-30 ENCOUNTER — TELEPHONE (OUTPATIENT)
Dept: CARE COORDINATION | Facility: CLINIC | Age: 30
End: 2020-06-30

## 2020-06-30 ENCOUNTER — PATIENT OUTREACH (OUTPATIENT)
Dept: NURSING | Facility: CLINIC | Age: 30
End: 2020-06-30
Payer: MEDICAID

## 2020-06-30 DIAGNOSIS — K64.4 EXTERNAL HEMORRHOIDS: ICD-10-CM

## 2020-06-30 DIAGNOSIS — R11.15 CYCLIC VOMITING SYNDROME: Primary | ICD-10-CM

## 2020-06-30 DIAGNOSIS — R11.2 INTRACTABLE VOMITING WITH NAUSEA, UNSPECIFIED VOMITING TYPE: ICD-10-CM

## 2020-06-30 NOTE — TELEPHONE ENCOUNTER
Left message or mother Sol to call back. If pt's mom Sol calls please scheduled f/u visit with PCP.

## 2020-06-30 NOTE — TELEPHONE ENCOUNTER
Referral to Gastroenterology ordered.     I would recommend that he see what the GI recommends for managing this issue.     The should come and see me after the GI appointments to discuss.  I am not going to order anything at this time.      For now, it will just have to be taking the Ondansetron (Zofran) dissolvable tablets when needed for serious nausea and vomiting.

## 2020-06-30 NOTE — TELEPHONE ENCOUNTER
See Care Coordination patient outreach documentation for details.    Patient with history of cyclical vomiting; patient's mother inquiring about GI referral for further work-up as well as possible interim plan for symptom management to avoid ED/hospital.    CC RN will review above with PCP.    Jaime Walter, RN  Clinic Care Coordinator  [facility] [place]  Ph: [phone]

## 2020-06-30 NOTE — PROGRESS NOTES
Clinic Care Coordination Contact    OUTREACH    Referral Information:  Referral Source: ED Follow-Up  Primary Diagnosis: GI Disorders  Chief Complaint   Patient presents with     Clinic Care Coordination - Post Hospital     hematochezia, external hemorrhoids     Clinic Care Coordination - Initial     needs assessment     Universal Utilization: reviewed on this date  Difficulty keeping appointments: No  Compliance Concerns: No  No-Show Concerns: No  No PCP office visit in Past Year: No  Utilization    Last refreshed: 6/30/2020 11:25 AM:  Hospital Admissions 1           Last refreshed: 6/30/2020 11:25 AM:  ED Visits 5           Last refreshed: 6/30/2020 11:25 AM:  No Show Count (past year) 0              Current as of: 6/30/2020 11:25 AM            CC RN outreach to patient/parent following patient's recent ED visit for hematochezia related to external hemorrhoids.    CC RN called and spoke with patient's mother; introduced self, discussed role of Care Coordination, and explained reason for call.    Patient's mother reports patient to be doing okay today; no persistent symptoms at this time.  CC RN reviewed patient's ED AVS and discussed interventions for external hemorrhoid management (warm/sitz bath, avoid straining through high-fiber diet and stool softeners as needed, adequate hydration, physical activity); patient's mother verbalized understanding.    Patient's mother would like to establish Goal to focus on patient's ongoing struggles with cyclic vomiting, noting several recent ED/hospital visits due to this issue; see Goal below.  CC RN will enroll patient with Care Coordination as discussed with patient's mother.    Clinical Concerns:  Current Medical Concerns: external hemorrhoids, cyclic vomiting syndrome  Current Behavioral Concerns: autism, developmental delay    Education Provided to patient: provided information about Care Coordination, reviewed ED AVS and provided education about management of external  hemorrhoids    Pain  Pain (GOAL): No    Medication Management:  Post-discharge medication reconciliation status: Discharge medications reviewed and reconciled.  Continue medications without change.    Functional Status:  Dependent ADLs: Independent  Dependent IADLs: Medication Management, Transportation, Cleaning, Cooking, Shopping  Bed or wheelchair confined: No  Mobility Status: Independent  Fallen 2 or more times in the past year?: No  Any fall with injury in the past year?: No    Living Situation:  Current living arrangement: I live in a private home with family    Lifestyle & Psychosocial Needs:  Diet: Regular  Inadequate nutrition (GOAL): No  Tube Feeding: No  Inadequate activity/exercise (GOAL): No  Significant changes in sleep pattern (GOAL): No  Transportation means: Family     Sabianism or spiritual beliefs that impact treatment: No  Mental health DX: Yes  Mental health DX how managed: Medication  Mental health management concern (GOAL): No  Informal Support system: Parent   Socioeconomic History     Marital status: Single     Spouse name: Not on file     Number of children: Not on file     Years of education: Not on file     Highest education level: Not on file     Tobacco Use     Smoking status: Never Smoker     Smokeless tobacco: Never Used   Substance and Sexual Activity     Alcohol use: No     Drug use: No     Sexual activity: Never      CC RN discussed MyChart enrollment with patient's mother; she plans to sign up per link/directions sent via text message.    Resources and Interventions:  Community Resources: County Programs, County Worker, PCA, Day Care  Supplies used at home: None  Equipment Currently Used at Home: none     Goals:   Goals        General    1. Medical     Notes - Note edited  6/30/2020 11:26 AM by Jaime Walter RN    Goal Statement: Patient will decrease his ED/hospital visits related to his cyclical vomiting.  Date Goal set: 6/30/20  Barriers: autism, developmental  delay  Strengths: supportive mother, motivated to feel better  Date to Achieve By: 12/31/20  Patient/parent expressed understanding of goal: Yes  Action steps to achieve this goal:  1. Patient follow-up with PCP to discuss plan for symptom management.  2. Patient see a GI specialist for his symptoms and additional work-up.  3. Patient will use his nausea medications as needed when having nausea.  4. Patient will continue working with Care Coordination to address barriers to achieving his goal.        Patient/Caregiver understanding: Yes    Outreach Frequency: monthly    Plan:    CC RN will send patient/mother Care Coordination introduction letter and Complex Care Plan.    CC RN will request clinic staff assistance to schedule patient for ED follow-up visit with PCP.    CC RN will send message to PCP to request GI referral and inquire about options for PRN interventions for patient's cyclic vomiting in hopes of preventing additional ED/hospital visits.    Patient's mother will help patient with external hemorrhoid management as discussed.    Patient/mother will promptly contact clinic with any new/worsening symptoms.    Patient's mother agreed to contact CC RN with additional questions or concerns.    Care Coordination will outreach to patient/parent in approximately 2 weeks to get updates on patient status, assess goal progress, and offer additional support and resources as indicated.    Jaime Walter, RN  Clinic Care Coordinator  [facility]  Ph: [phone]

## 2020-07-01 NOTE — TELEPHONE ENCOUNTER
Patient Scheduled for 07/06/2020 at 4pm per Sol Zarate. Will reschedule if unable to make appointment. In Clinic Scheduled Given.

## 2020-07-01 NOTE — TELEPHONE ENCOUNTER
Nat Richey CMA contacted Ravi on 07/01/20 and left a message. If patient calls back please schedule follow up appointment as soon as possible with Dr. Gandara.

## 2020-07-06 ENCOUNTER — OFFICE VISIT (OUTPATIENT)
Dept: INTERNAL MEDICINE | Facility: CLINIC | Age: 30
End: 2020-07-06
Payer: MEDICAID

## 2020-07-06 VITALS
HEIGHT: 76 IN | OXYGEN SATURATION: 97 % | BODY MASS INDEX: 25.39 KG/M2 | DIASTOLIC BLOOD PRESSURE: 84 MMHG | TEMPERATURE: 98 F | WEIGHT: 208.5 LBS | HEART RATE: 65 BPM | SYSTOLIC BLOOD PRESSURE: 136 MMHG

## 2020-07-06 DIAGNOSIS — R11.15 INTRACTABLE CYCLICAL VOMITING WITH NAUSEA: ICD-10-CM

## 2020-07-06 DIAGNOSIS — K64.4 EXTERNAL HEMORRHOIDS: ICD-10-CM

## 2020-07-06 DIAGNOSIS — R11.15 CYCLIC VOMITING SYNDROME: Primary | ICD-10-CM

## 2020-07-06 DIAGNOSIS — E06.3 AUTOIMMUNE HYPOTHYROIDISM: ICD-10-CM

## 2020-07-06 LAB — TSH SERPL DL<=0.005 MIU/L-ACNC: 0.45 MU/L (ref 0.4–4)

## 2020-07-06 PROCEDURE — 99214 OFFICE O/P EST MOD 30 MIN: CPT | Performed by: INTERNAL MEDICINE

## 2020-07-06 PROCEDURE — 36415 COLL VENOUS BLD VENIPUNCTURE: CPT | Performed by: INTERNAL MEDICINE

## 2020-07-06 PROCEDURE — 84443 ASSAY THYROID STIM HORMONE: CPT | Performed by: INTERNAL MEDICINE

## 2020-07-06 RX ORDER — SUMATRIPTAN 20 MG/1
1 SPRAY NASAL PRN
Qty: 6 EACH | Refills: 5 | Status: SHIPPED | OUTPATIENT
Start: 2020-07-06 | End: 2024-08-20

## 2020-07-06 RX ORDER — LEVOTHYROXINE SODIUM 125 UG/1
125 TABLET ORAL DAILY
Qty: 90 TABLET | Refills: 3 | Status: SHIPPED | OUTPATIENT
Start: 2020-07-06 | End: 2021-07-05

## 2020-07-06 RX ORDER — ONDANSETRON 4 MG/1
4 TABLET, ORALLY DISINTEGRATING ORAL EVERY 8 HOURS PRN
Qty: 30 TABLET | Refills: 3 | Status: SHIPPED | OUTPATIENT
Start: 2020-07-06 | End: 2020-10-03

## 2020-07-06 ASSESSMENT — MIFFLIN-ST. JEOR: SCORE: 2002.25

## 2020-07-06 NOTE — PROGRESS NOTES
Subjective     Ravi Dunne is a 29 year old male who presents to clinic today for the following health issues:    HPI   ED/UC Followup:    Facility:  [facility] ED  Date of visit: 6/26/2020  Reason for visit: hematochezia, external hemorrhoids   Current Status: sx have improved.    [facility] Emergency Room information reviewed, including physical exam.  Small external hemorrhoid was seen.    Patient had been reporting constipation with smaller harder stools in the days and weeks prior to this.  Mother reports no further episodes of rectal bleeding or blood on his underwear, no hematochezia.  No history of melena.       2.  Admitted for overnight hospital stay 5/13-5/14 for severe episode of cyclic vomiting and dehydration.  Received subcutaneous Imitrex which may have helped.    For the past few years, he has 1 serious episode of cyclic vomiting that resulting in a emergency room visit approximately per year.  No specific triggers identified.    3.  History of hypothyroidism.  On replacement therapy.  He has not experienced any significant side effects of this medication.   The patient denies of fatigue, weight changes, heat/cold intolerance, hair changes, nail changes, bowel changes.     Latest labs reviewed:    Lab Results   Component Value Date    TSH 0.38 04/03/2019    TSH 0.80 05/02/2018    TSH 1.98 11/15/2017    TSH 8.48 08/11/2017    TSH 7.47 02/14/2017    TSH 1.17 06/24/2014    TSH 2.80 02/02/2010    TSH 2.52 04/23/2009         Past Medical History:  ---------------------------  Past Medical History:   Diagnosis Date     Anxiety      Autism      Autoimmune hypothyroidism 02/14/2017    positive anti-thyroid antibodies     Cyclic vomiting syndrome      Development delay      Hyperlipidemia 2008    elevated triglycerides [phone]     Mood disorder (H)        Past Surgical History:  ---------------------------  Past Surgical History:   Procedure Laterality Date     NO HISTORY OF  SURGERY         Current Medications:  ---------------------------  Current Outpatient Medications   Medication Sig Dispense Refill     cetirizine (ZYRTEC) 10 MG tablet Take 1 tablet (10 mg) by mouth daily 90 tablet 0     escitalopram (LEXAPRO) 20 MG tablet Take 1 tablet (20 mg) by mouth At Bedtime       levothyroxine (SYNTHROID/LEVOTHROID) 125 MCG tablet Take 1 tablet (125 mcg) by mouth daily 90 tablet 0     omeprazole (PRILOSEC) 20 MG DR capsule Take 1 capsule (20 mg) by mouth daily 90 capsule 0     OXcarbazepine (TRILEPTAL) 300 MG tablet Take 300 mg by mouth At Bedtime Takes in addition to 600 mg tab = 900 mg total at bedtime       OXcarbazepine (TRILEPTAL) 600 MG tablet Take 600 mg by mouth At Bedtime Takes in addition to 300 mg tab = 900 mg total at bedtime       QUEtiapine (SEROQUEL) 25 MG tablet Take 25 mg by mouth 2 times daily At 3 pm and 6 pm       QUEtiapine (SEROQUEL) 50 MG tablet Take 50 mg by mouth At Bedtime        metoclopramide (REGLAN) 5 MG tablet Take 1 tablet (5 mg) by mouth 3 times daily as needed (nausea/vomiting) As needed for nausea/vomiting 15 tablet 0     ondansetron (ZOFRAN ODT) 4 MG ODT tab Take 1 tablet (4 mg) by mouth every 8 hours as needed for nausea 30 tablet 3       Allergies:  -------------  Allergies   Allergen Reactions     No Clinical Screening - See Comments      granola     Sulfa Drugs      Father was allergic to sulfa drugs and pt's mother does not want him to have it       Social History:  -------------------  Social History     Socioeconomic History     Marital status: Single     Spouse name: Not on file     Number of children: Not on file     Years of education: Not on file     Highest education level: Not on file   Occupational History     Not on file   Social Needs     Financial resource strain: Not on file     Food insecurity     Worry: Not on file     Inability: Not on file     Transportation needs     Medical: Not on file     Non-medical: Not on file   Tobacco Use      "Smoking status: Never Smoker     Smokeless tobacco: Never Used   Substance and Sexual Activity     Alcohol use: No     Drug use: No     Sexual activity: Never   Lifestyle     Physical activity     Days per week: Not on file     Minutes per session: Not on file     Stress: Not on file   Relationships     Social connections     Talks on phone: Not on file     Gets together: Not on file     Attends Judaism service: Not on file     Active member of club or organization: Not on file     Attends meetings of clubs or organizations: Not on file     Relationship status: Not on file     Intimate partner violence     Fear of current or ex partner: Not on file     Emotionally abused: Not on file     Physically abused: Not on file     Forced sexual activity: Not on file   Other Topics Concern     Parent/sibling w/ CABG, MI or angioplasty before 65F 55M? Not Asked   Social History Narrative     Not on file       Family Medical History:  ------------------------------  Family History   Problem Relation Age of Onset     Alcoholism Father      Cancer Father 50        throat cancer from smoking; in remission with chemo and surgery     Autism Spectrum Disorder Brother           Reviewed and updated as needed this visit by Provider  Tobacco  Allergies  Meds  Problems  Med Hx  Surg Hx  Fam Hx         Review of Systems   Constitutional, HEENT, cardiovascular, pulmonary, gi and gu systems are negative, except as otherwise noted.      Objective    /84   Pulse 65   Temp 98  F (36.7  C) (Temporal)   Ht 1.93 m (6' 4\")   Wt 94.6 kg (208 lb 8 oz)   SpO2 97%   BMI 25.38 kg/m    Body mass index is 25.38 kg/m .  Physical Exam   GENERAL alert and no distress  EYES:  Normal sclera,conjunctiva, EOMI  HENT: oral and posterior pharynx without lesions or erythema, facies symmetric  NECK: Neck supple. No LAD, without thyroidmegaly.  RESP: Clear to ausculation bilaterally without wheezes or crackles. Normal BS in all fields.  CV: RRR " normal S1S2 without murmurs, rubs or gallops.  LYMPH: no cervical lymph adenopathy appreciated  MS: extremities- no gross deformities of the visible extremities noted,   EXT:  no lower extremity edema  PSYCH: Alert and oriented times 3; speech- coherent  SKIN:  No obvious significant skin lesions on visible portions of face     Diagnostic Test Results:  Labs reviewed in Epic          (R11.15) Cyclic vomiting syndrome  (primary encounter diagnosis)  Comment: Trial of sumatriptan.  Mother feels that the nasal spray will be easier to administer than the subcutaneous version.  Take 20 mg nasal spray at the onset of acute cyclic vomiting episode.  Repeat a second dose in 3 hours if needed, for a max of 2 doses per day, 6 doses per week.  Use ondansetron as needed.  There are other third and fourth line treatments that can be considered if this fails to help.  Fortunately his episodes are infrequent.  His mother initially thought that the heat of the summer may have been the trigger however they recently just got central air conditioning which is improved comfort.  Plan: SUMAtriptan (IMITREX) 20 MG/ACT nasal spray,         ondansetron (ZOFRAN ODT) 4 MG ODT tab            (E06.3) Autoimmune hypothyroidism  Comment: Recheck labs, titrate meds as needed.  Plan: TSH with free T4 reflex, levothyroxine         (SYNTHROID/LEVOTHROID) 125 MCG tablet            (K64.4) External hemorrhoids  Comment: Discussed hemorrhoids in detail, including pathophysiology, their cause by difficult elimination. conservative treatments starting with increased water and dietary fiber (friuts/vegetables/bran/psyllium), conservative treatments such as Preparation H, Anusol, and even topical benzocaine oint.  Discussed point of intervention and referral to colorectal surgeons as indicated by symptoms.    Plan:     (R11.15) Intractable cyclical vomiting with nausea  Comment:   Plan: omeprazole (PRILOSEC) 20 MG DR beulah Gandara,  M.D.  Dept. of Internal Medicine  [facility]     *  I spent greater than 25 minutes with pt and mother greater than 50% of time was educational and counseling.

## 2020-07-06 NOTE — LETTER
July 6, 2020      Ravi SCHUMACHER Uzair  [address] [place] [address]        Dear ,    We are writing to inform you of your test results.    Your test results fall within the expected range(s) or remain unchanged from previous results.  Continue all medications at the same doses.  Contact your usual pharmacy if you need refills.     Resulted Orders   TSH with free T4 reflex   Result Value Ref Range    TSH 0.45 0.40 - 4.00 mU/L       If you have any questions or concerns, please call the clinic at the number listed above.       Sincerely,        Bhaskar Gandara M.D.  Dept. of Internal Medicine  [facility]

## 2020-07-06 NOTE — PATIENT INSTRUCTIONS
*  Rechecking labs today to recheck the thyroid, we will send a new prescription for a different dose if needed.     CYCLIC VOMITING:    *  Trial of Sumatriptan (generic Imitrex) nasal spray at the onset of cyclic vomiting episodes.  Take a dose of the nasal spray at the onset of the acute vomiting, may repeat a second dose in 3 hours if needed.      *  Ondansetron (Zofran) tablets under the tongue every 6 hours as needed for vomiting.        *  Continue all other medications at the same doses.  Contact your usual pharmacy if you need refills.

## 2020-07-16 DIAGNOSIS — J30.2 SEASONAL ALLERGIC RHINITIS, UNSPECIFIED TRIGGER: ICD-10-CM

## 2020-07-16 RX ORDER — CETIRIZINE HYDROCHLORIDE 10 MG/1
10 TABLET ORAL DAILY
Qty: 90 TABLET | Refills: 3 | Status: SHIPPED | OUTPATIENT
Start: 2020-07-16 | End: 2021-07-16

## 2020-07-21 ENCOUNTER — PATIENT OUTREACH (OUTPATIENT)
Dept: CARE COORDINATION | Facility: CLINIC | Age: 30
End: 2020-07-21

## 2020-07-21 NOTE — PROGRESS NOTES
Clinic Care Coordination Contact  [facility]/Voicemail       Clinical Data: Care Coordinator Outreach  Outreach attempted x 1.  Called patient's mother Sol, but could not leave a voicemail message.    Plan: Care Coordinator will try to reach patient again in 3-5 business days.    Next Outreach: 7/28/20       ROBERTO Almonte  Clinic Care Coordination  [facility]  Phone: [phone]

## 2020-07-28 ENCOUNTER — PATIENT OUTREACH (OUTPATIENT)
Dept: CARE COORDINATION | Facility: CLINIC | Age: 30
End: 2020-07-28

## 2020-07-28 NOTE — PROGRESS NOTES
Clinic Care Coordination Contact    Per chart review, CC CHW currently in the process of patient outreach.  CC RN will review patient's chart again in approximately 1 week and await updates following CC CHW outreach to patient/mother.    Jaime Walter RN  Clinic Care Coordinator  [facility]  Ph: [phone]

## 2020-08-03 ENCOUNTER — PATIENT OUTREACH (OUTPATIENT)
Dept: CARE COORDINATION | Facility: CLINIC | Age: 30
End: 2020-08-03

## 2020-08-03 NOTE — PROGRESS NOTES
Clinic Care Coordination Contact  [facility]/Voicemail       Clinical Data: Care Coordinator Outreach  Outreach attempted x 2.  Left message on patient's mother's voicemail with call back information and requested return call.  Plan: Care Coordinator will try to reach patient again in  two weeks for a final attempt.    Next Outreach: 8/14/20     ROBERTO Almonte  Clinic Care Coordination  [facility] Clinics: [facility], [facility], and [facility]  Phone: [phone]

## 2020-08-06 NOTE — PROGRESS NOTES
Clinic Care Coordination Contact    Situation: Patient chart reviewed by care coordinator.    Background: Care Coordination following patient for enrollment with goal of reducing ED/hospital visits related to patient's cyclical vomiting    Assessment: Per chart review, CC CHW unable to contact patient/parent x 2 (7/21/20 and 8/3/20).    Plan/Recommendations: CC RN will make final outreach attempt in approximately 2 weeks.    Jaime Walter RN  Clinic Care Coordinator  [facility]  Ph: [phone]

## 2020-08-24 ENCOUNTER — PATIENT OUTREACH (OUTPATIENT)
Dept: CARE COORDINATION | Facility: CLINIC | Age: 30
End: 2020-08-24

## 2020-08-24 NOTE — PROGRESS NOTES
Clinic Care Coordination Contact  [facility]/Voicemail    Referral Source: ED Follow-Up  CC RN outreach to get updates on patient status, assess goal progress, and provide support and additional resources as needed.    Of note, Care Coordination unable to contact patient/parent on 7/21/20 and 8/3/20; no return calls received.    Clinical Data: Care Coordinator Outreach  Outreach attempted x 1.  Left message on patient's mother's voicemail with call back information and requested return call.    Plan: Care Coordinator will send disenrollment letter with care coordinator contact information via secure e-mail. Care Coordinator will do no further outreaches at this time.    Jaime Walter, RN  Clinic Care Coordinator  [facility]  Ph: [phone]

## 2020-08-24 NOTE — LETTER
[place] CARE COORDINATION  [facility]  [address], [place] [address]    August 24, 2020    Ravi Dunne  [address]  [facility] [address]      Dear Ravi/parent(s),    I have been unsuccessful in reaching you since our last contact. At this time the Care Coordination team will make no further attempts to reach you, however this does not change your ability to continue receiving care from your providers at your primary care clinic. If you need additional support from a care coordinator in the future please contact me (ph: [phone]) or [facility] Worker (ph: [phone]).    All of us at the [facility] are invested in your health and are here to assist you in meeting your goals.     Sincerely,    Jaime Walter RN  Clinic Care Coordinator  [facility]

## 2020-10-03 ENCOUNTER — APPOINTMENT (OUTPATIENT)
Dept: GENERAL RADIOLOGY | Facility: CLINIC | Age: 30
End: 2020-10-03
Attending: EMERGENCY MEDICINE
Payer: MEDICAID

## 2020-10-03 ENCOUNTER — HOSPITAL ENCOUNTER (EMERGENCY)
Facility: CLINIC | Age: 30
Discharge: HOME | End: 2020-10-03
Attending: EMERGENCY MEDICINE | Admitting: EMERGENCY MEDICINE
Payer: MEDICAID

## 2020-10-03 VITALS
DIASTOLIC BLOOD PRESSURE: 86 MMHG | WEIGHT: 215 LBS | RESPIRATION RATE: 16 BRPM | SYSTOLIC BLOOD PRESSURE: 132 MMHG | HEIGHT: 76 IN | BODY MASS INDEX: 26.18 KG/M2 | TEMPERATURE: 97.6 F | OXYGEN SATURATION: 99 %

## 2020-10-03 DIAGNOSIS — R11.0 NAUSEA: ICD-10-CM

## 2020-10-03 LAB
ALBUMIN SERPL-MCNC: 4.8 G/DL (ref 3.4–5)
ALP SERPL-CCNC: 84 U/L (ref 40–150)
ALT SERPL W P-5'-P-CCNC: 39 U/L (ref 0–70)
ANION GAP SERPL CALCULATED.3IONS-SCNC: 4 MMOL/L (ref 3–14)
AST SERPL W P-5'-P-CCNC: 23 U/L (ref 0–45)
BASOPHILS # BLD AUTO: 0 10E9/L (ref 0–0.2)
BASOPHILS NFR BLD AUTO: 0.2 %
BILIRUB SERPL-MCNC: 0.4 MG/DL (ref 0.2–1.3)
BUN SERPL-MCNC: 8 MG/DL (ref 7–30)
CALCIUM SERPL-MCNC: 9.4 MG/DL (ref 8.5–10.1)
CHLORIDE SERPL-SCNC: 106 MMOL/L (ref 94–109)
CO2 SERPL-SCNC: 31 MMOL/L (ref 20–32)
CREAT SERPL-MCNC: 0.94 MG/DL (ref 0.66–1.25)
DIFFERENTIAL METHOD BLD: ABNORMAL
EOSINOPHIL # BLD AUTO: 0 10E9/L (ref 0–0.7)
EOSINOPHIL NFR BLD AUTO: 0.3 %
ERYTHROCYTE [DISTWIDTH] IN BLOOD BY AUTOMATED COUNT: 12.4 % (ref 10–15)
GFR SERPL CREATININE-BSD FRML MDRD: >90 ML/MIN/{1.73_M2}
GLUCOSE SERPL-MCNC: 97 MG/DL (ref 70–99)
HCT VFR BLD AUTO: 43.2 % (ref 40–53)
HGB BLD-MCNC: 15.4 G/DL (ref 13.3–17.7)
IMM GRANULOCYTES # BLD: 0 10E9/L (ref 0–0.4)
IMM GRANULOCYTES NFR BLD: 0.2 %
LIPASE SERPL-CCNC: 97 U/L (ref 73–393)
LYMPHOCYTES # BLD AUTO: 2.1 10E9/L (ref 0.8–5.3)
LYMPHOCYTES NFR BLD AUTO: 18.4 %
MCH RBC QN AUTO: 30.3 PG (ref 26.5–33)
MCHC RBC AUTO-ENTMCNC: 35.6 G/DL (ref 31.5–36.5)
MCV RBC AUTO: 85 FL (ref 78–100)
MONOCYTES # BLD AUTO: 0.5 10E9/L (ref 0–1.3)
MONOCYTES NFR BLD AUTO: 4.4 %
NEUTROPHILS # BLD AUTO: 8.6 10E9/L (ref 1.6–8.3)
NEUTROPHILS NFR BLD AUTO: 76.5 %
NRBC # BLD AUTO: 0 10*3/UL
NRBC BLD AUTO-RTO: 0 /100
PLATELET # BLD AUTO: 201 10E9/L (ref 150–450)
POTASSIUM SERPL-SCNC: 3.9 MMOL/L (ref 3.4–5.3)
PROT SERPL-MCNC: 8.4 G/DL (ref 6.8–8.8)
RBC # BLD AUTO: 5.09 10E12/L (ref 4.4–5.9)
SODIUM SERPL-SCNC: 141 MMOL/L (ref 133–144)
WBC # BLD AUTO: 11.3 10E9/L (ref 4–11)

## 2020-10-03 PROCEDURE — 83690 ASSAY OF LIPASE: CPT | Performed by: EMERGENCY MEDICINE

## 2020-10-03 PROCEDURE — 74019 RADEX ABDOMEN 2 VIEWS: CPT

## 2020-10-03 PROCEDURE — 96360 HYDRATION IV INFUSION INIT: CPT

## 2020-10-03 PROCEDURE — 250N000011 HC RX IP 250 OP 636: Performed by: EMERGENCY MEDICINE

## 2020-10-03 PROCEDURE — 99284 EMERGENCY DEPT VISIT MOD MDM: CPT | Mod: 25

## 2020-10-03 PROCEDURE — 80053 COMPREHEN METABOLIC PANEL: CPT | Performed by: EMERGENCY MEDICINE

## 2020-10-03 PROCEDURE — 258N000003 HC RX IP 258 OP 636: Performed by: EMERGENCY MEDICINE

## 2020-10-03 PROCEDURE — 85025 COMPLETE CBC W/AUTO DIFF WBC: CPT | Performed by: EMERGENCY MEDICINE

## 2020-10-03 RX ORDER — ONDANSETRON 4 MG/1
4 TABLET, ORALLY DISINTEGRATING ORAL ONCE
Status: COMPLETED | OUTPATIENT
Start: 2020-10-03 | End: 2020-10-03

## 2020-10-03 RX ORDER — ONDANSETRON 4 MG/1
4 TABLET, ORALLY DISINTEGRATING ORAL EVERY 8 HOURS PRN
Qty: 9 TABLET | Refills: 0 | Status: SHIPPED | OUTPATIENT
Start: 2020-10-03 | End: 2020-10-06

## 2020-10-03 RX ADMIN — ONDANSETRON 4 MG: 4 TABLET, ORALLY DISINTEGRATING ORAL at 20:31

## 2020-10-03 RX ADMIN — SODIUM CHLORIDE 1000 ML: 9 INJECTION, SOLUTION INTRAVENOUS at 21:21

## 2020-10-03 ASSESSMENT — MIFFLIN-ST. JEOR: SCORE: 2031.73

## 2020-10-03 ASSESSMENT — ENCOUNTER SYMPTOMS
FEVER: 0
VOMITING: 0
NAUSEA: 1
ABDOMINAL PAIN: 1

## 2020-10-03 NOTE — ED AVS SNAPSHOT
[facility] Emergency Dept  [address] [facility] [address]  Phone: [phone]  Fax: [phone]                                    Ravi Dunne   MRN: [id number]    Department: [facility] Emergency Dept   Date of Visit: 10/3/2020           After Visit Summary Signature Page    I have received my discharge instructions, and my questions have been answered. I have discussed any challenges I see with this plan with the nurse or doctor.    ..........................................................................................................................................  Patient/Patient Representative Signature      ..........................................................................................................................................  Patient Representative Print Name and Relationship to Patient    ..................................................               ................................................  Date                                   Time    ..........................................................................................................................................  Reviewed by Signature/Title    ...................................................              ..............................................  Date                                               Time          22EPIC Rev 08/18

## 2020-10-04 ASSESSMENT — ENCOUNTER SYMPTOMS
COUGH: 0
SORE THROAT: 0

## 2020-10-04 NOTE — ED TRIAGE NOTES
Pt presents with mother and she reports pt has cyclical vomiting syndrome and feels as if he is going to be ill. Pt has not yet vomited but told mother he felt like he may be sick and wanted to come here. Pt has cognitive delay, mother at bedside. Pt reports abdominal pain

## 2020-10-04 NOTE — ED PROVIDER NOTES
"  History     Chief Complaint:  Vomiting    The history is provided by the patient and a parent. The history is limited by a developmental delay.      Ravi Dunne is a 29 year old male with a history of cyclical vomiting syndrome of unclear etiology since the age of 3, who presents with nausea and abdominal pain. Per his mother, he feels as if he is going to be ill and wanted to come to the ED. He was seen 4 times in May for similar symptoms and was admitted 5/13/20 for intractable nausea and vomiting. She denies any recent medication changes, changes in medication dosage, or atypical food intake. He has not vomited today and ate a typical lunch. He normally takes Zofran for his symptoms. Of note, he was able to manage PO intake at lunch and had an egg roll from a food truck at the [employer] market. All of his family members also ate this and have not experienced any symptoms. Bowel movements have been normal.     Allergies:  Sulfa drugs      Medications:    Zyrtec  Lexapro  Synthroid  Reglan  Prilosec  Zofran  Trileptal  Seroquel  Imitrex     Past Medical History:    Anxiety  Autism  Autoimmune hypothyroidism  Cyclic vomiting syndrome  Developmental delay  Hyperlipidemia  Mood disorder    Past Surgical History:    History reviewed. No pertinent surgical history.    Family History:    Alcoholism  Throat cancer  Autism Spectrum Disorder     Social History:  Smoking status: never  Alcohol use: no  Marital Status:  Single [1]     Review of Systems   Constitutional: Negative for fever.   HENT: Negative for sore throat.    Respiratory: Negative for cough.    Gastrointestinal: Positive for abdominal pain and nausea. Negative for vomiting.   All other systems reviewed and are negative.    Physical Exam     Patient Vitals for the past 24 hrs:   BP Temp Temp src Resp SpO2 Height Weight   10/03/20 2238 132/86 -- -- -- 99 % -- --   10/03/20 1939 (!) 156/87 97.6  F (36.4  C) Temporal 16 97 % 1.93 m (6' 4\") 97.5 kg (215 lb) "     Physical Exam     Physical Exam   Constitutional:  Pleasant male. Autistic.   HENT:   Mouth/Throat:   Oropharynx is clear and moist.   Eyes:    Conjunctivae normal and EOM are normal. Pupils are equal, round, and reactive to light.   Neck:    Normal range of motion.   Cardiovascular: Normal rate, regular rhythm and normal heart sounds.  Exam reveals no gallop and no friction rub.  No murmur heard.  Pulmonary/Chest:  Effort normal and breath sounds normal. Patient has no wheezes. Patient has no rales.   Abdominal:   Soft. Bowel sounds are normal. Patient exhibits no mass. There is no focal tenderness. There is no rebound and no guarding.   Musculoskeletal:  Normal range of motion. Patient exhibits no edema.   Neurological:   Patient is alert and oriented to person, place, and time. Patient has normal strength. No cranial nerve deficit or sensory deficit. GCS 15  Skin:   Skin is warm and dry. No rash noted. No erythema.   Psychiatric:   Patient has a normal mood    Emergency Department Course   Imaging:  XR Abdomen, 1 view  IMPRESSION: Nonobstructed bowel gas pattern  Reading per radiology    Laboratory:  Laboratory findings were communicated with the patient who voiced understanding of the findings.  CBC: WBC 11.3 (H) o/w WNL. ( HGB 15.4, )   CMP: Glucose 97, o/w WNL (Creatinine: 0.94)    Lipase: 97    Interventions:  2031 Zofran, 4 mg, PO  2121 NS 1L IV Bolus    Emergency Department Course:  Past medical records, nursing notes, and vitals reviewed.  2026: I performed an exam of the patient and obtained history, as documented above.     Blood drawn. This was sent to the lab for further testing, results above.    The patient was sent for an abdomen XR while in the emergency department, findings above.    2305: I rechecked the patient. He tolerated water without vomiting and stated he felt better. Explained findings to patient and mother.    2313: I rechecked the patient. Findings and plan explained to the  Patient and mother. Patient discharged home with instructions regarding supportive care, medications, and reasons to return. The importance of close follow-up was reviewed.     Impression & Plan      Medical Decision Making:  Ravi Dunne is a 29 year old male who presents to the emergency department today for evaluation of nausea. History of cyclical vomiting ever since he was 3 years old of unclear etiology. His examination was very benign. He had no focal abdominal pain that was concerning for an intraabdominal infection. He did not actually vomit so I tried ODT zofran and did a flat and upright X ray to make sure that there was no abnormal gas pattern. Xray looked fine. He felt a little improved with the oral zofran, but wanted IV and fluids, so this was done. He felt much better after getting 1 L of normal saline. Basic blood work was fairly unremarkable. He then was able to drink a glass of water, hold it down, and at this point, has not had any vomiting. At this point, I feel he is safe for discharge. I will write him for ODT zofran. He will follow up closely with his primary care doctor, bland diet. Return to emergency department if vomiting and unable to keep fluids down.     Diagnosis:    ICD-10-CM    1. Nausea  R11.0        Disposition:  discharged to home    Scribe Disclosure:  I, Kimmy Youssef, am serving as a scribe at 8:11 PM on 10/3/2020 to document services personally performed by Nat Orourke MD based on my observations and the provider's statements to me.     Kimmy Youssef  10/3/2020   [facility] EMERGENCY DEPT       Nat Orourke MD  10/04/20 0025

## 2020-10-06 ENCOUNTER — OFFICE VISIT (OUTPATIENT)
Dept: INTERNAL MEDICINE | Facility: CLINIC | Age: 30
End: 2020-10-06
Payer: MEDICAID

## 2020-10-06 VITALS
WEIGHT: 204.1 LBS | DIASTOLIC BLOOD PRESSURE: 88 MMHG | SYSTOLIC BLOOD PRESSURE: 118 MMHG | OXYGEN SATURATION: 98 % | TEMPERATURE: 98.2 F | HEART RATE: 89 BPM | HEIGHT: 76 IN | BODY MASS INDEX: 24.85 KG/M2

## 2020-10-06 DIAGNOSIS — Z23 NEED FOR VACCINATION: ICD-10-CM

## 2020-10-06 DIAGNOSIS — E06.3 AUTOIMMUNE HYPOTHYROIDISM: ICD-10-CM

## 2020-10-06 DIAGNOSIS — R11.15 CYCLIC VOMITING SYNDROME: Primary | ICD-10-CM

## 2020-10-06 DIAGNOSIS — R30.0 DYSURIA: ICD-10-CM

## 2020-10-06 LAB
ALBUMIN UR-MCNC: NEGATIVE MG/DL
APPEARANCE UR: CLEAR
BILIRUB UR QL STRIP: ABNORMAL
COLOR UR AUTO: YELLOW
GLUCOSE UR STRIP-MCNC: NEGATIVE MG/DL
HGB UR QL STRIP: NEGATIVE
KETONES UR STRIP-MCNC: ABNORMAL MG/DL
LEUKOCYTE ESTERASE UR QL STRIP: NEGATIVE
NITRATE UR QL: NEGATIVE
PH UR STRIP: 7 PH (ref 5–7)
SOURCE: ABNORMAL
SP GR UR STRIP: 1.02 (ref 1–1.03)
UROBILINOGEN UR STRIP-ACNC: 2 EU/DL (ref 0.2–1)

## 2020-10-06 PROCEDURE — 90686 IIV4 VACC NO PRSV 0.5 ML IM: CPT | Performed by: INTERNAL MEDICINE

## 2020-10-06 PROCEDURE — 90471 IMMUNIZATION ADMIN: CPT

## 2020-10-06 PROCEDURE — 81003 URINALYSIS AUTO W/O SCOPE: CPT | Performed by: INTERNAL MEDICINE

## 2020-10-06 PROCEDURE — 99214 OFFICE O/P EST MOD 30 MIN: CPT | Mod: 25 | Performed by: INTERNAL MEDICINE

## 2020-10-06 PROCEDURE — 90715 TDAP VACCINE 7 YRS/> IM: CPT

## 2020-10-06 PROCEDURE — 90471 IMMUNIZATION ADMIN: CPT | Performed by: INTERNAL MEDICINE

## 2020-10-06 RX ORDER — ONDANSETRON 4 MG/1
4-8 TABLET, ORALLY DISINTEGRATING ORAL EVERY 8 HOURS PRN
Qty: 30 TABLET | Refills: 1 | Status: SHIPPED | OUTPATIENT
Start: 2020-10-06 | End: 2021-06-14

## 2020-10-06 ASSESSMENT — MIFFLIN-ST. JEOR: SCORE: 1982.29

## 2020-10-06 NOTE — PATIENT INSTRUCTIONS
*  Use the Zofran as needed for nausea or vomiting    *  Ok to advance the diet as tolerated.     *  No evidence for any bladder infection, the urine was just overly concentrated.     *  Try to drink more water throughout the day.  Aim for at least 6-8 glasses of liquid per day.

## 2020-10-06 NOTE — PROGRESS NOTES
"Subjective     Ravi Dunne is a 29 year old male who presents to clinic today for the following health issues:    HPI         ED/UC Followup:    Facility:  [facility] ED  Date of visit: 10/3/2020  Reason for visit: nausea    Current Status: still experiencing mild nausea, pain with urination   Ate entire roast beef sandwich yesterday night for dinner.   Having normal bowel movements.  No fevers, no chills.       2.  Mother reports that the urine is slightly more \"discolored \".  And wonders about mild irritation on the head of his penis.  Patient generally drinks water throughout the day but they cannot quantify how many glasses.  Denies flank pain no fevers no chills.    3.  History of hypothyroidism.  On replacement therapy.  He has not experienced any significant side effects of this medication.   The patient denies of fatigue, weight changes, heat/cold intolerance, hair changes, nail changes, bowel changes.     Latest labs reviewed:    Lab Results   Component Value Date    TSH 0.45 07/06/2020    TSH 0.38 04/03/2019    TSH 0.80 05/02/2018    TSH 1.98 11/15/2017    TSH 8.48 08/11/2017    TSH 7.47 02/14/2017    TSH 1.17 06/24/2014    TSH 2.80 02/02/2010    TSH 2.52 04/23/2009         I reviewed the information recorded in the patient's EPIC chart (including but not limited to medical history, surgical history, problem list, medication list, and allergy list) and updated information as needed.         Review of Systems   Constitutional, HEENT, cardiovascular, pulmonary, gi and gu systems are negative, except as otherwise noted.      Objective    /88   Pulse 89   Temp 98.2  F (36.8  C) (Temporal)   Ht 1.93 m (6' 4\")   Wt 92.6 kg (204 lb 1.6 oz)   SpO2 98%   BMI 24.84 kg/m    Body mass index is 24.84 kg/m .  Physical Exam   GENERAL alert, pleasant, and no distress.  Very talkative.  EYES:  Normal sclera,conjunctiva, EOMI  HENT: oral and posterior pharynx without lesions or erythema, " facies symmetric  NECK: Neck supple. No LAD, without thyroidmegaly.  RESP: Clear to ausculation bilaterally without wheezes or crackles. Normal BS in all fields.  CV: RRR normal S1S2 without murmurs, rubs or gallops.  LYMPH: no cervical lymph adenopathy appreciated  MS: extremities- no gross deformities of the visible extremities noted,   EXT:  no lower extremity edema  PSYCH: Alert and oriented, speech- coherent, appears at usual baseline.  SKIN:  No obvious significant skin lesions on visible portions of face     Results for orders placed or performed in visit on 10/06/20   *UA reflex to Microscopic and Culture ([facility] (except [place] and [place])     Status: Abnormal    Specimen: Midstream Urine   Result Value Ref Range    Color Urine Yellow     Appearance Urine Clear     Glucose Urine Negative NEG^Negative mg/dL    Bilirubin Urine Small (A) NEG^Negative    Ketones Urine Trace (A) NEG^Negative mg/dL    Specific Gravity Urine 1.020 1.003 - 1.035    Blood Urine Negative NEG^Negative    pH Urine 7.0 5.0 - 7.0 pH    Protein Albumin Urine Negative NEG^Negative mg/dL    Urobilinogen Urine 2.0 (H) 0.2 - 1.0 EU/dL    Nitrite Urine Negative NEG^Negative    Leukocyte Esterase Urine Negative NEG^Negative    Source Midstream Urine             (R11.15) Cyclic vomiting syndrome  (primary encounter diagnosis)  Comment: Another episode, wonder what the triggers could be.  He consider worsening anxiety or reaction to stress.  Use Zofran as needed.  Advance diet slowly.  Plan: ondansetron (ZOFRAN ODT) 4 MG ODT tab            (R30.0) Dysuria  Comment: No evidence for infection, no evidence for hematuria microscopic or gross.  Could be simple concentration effect, recommended he continue to remain well-hydrated.  Plan: *UA reflex to Microscopic and Culture ([facility] (except [place] and         [place])            (Z23) Need for vaccination  Comment: Due for influenza and tetanus  shots.  Both were given today without incident.  Plan: TDAP VACCINE, HC FLU VAC PRESRV FREE QUAD SPLIT        VIR > 6 MONTHS IM, ADMIN 1st VACCINE, EA ADD'L         VACCINE            (E06.3) Autoimmune hypothyroidism  Comment: This condition is currently controlled on the current medical regimen.  Continue current therapy.   Plan:

## 2021-02-15 ENCOUNTER — HOSPITAL ENCOUNTER (EMERGENCY)
Facility: CLINIC | Age: 31
Discharge: HOME | End: 2021-02-15
Attending: EMERGENCY MEDICINE | Admitting: EMERGENCY MEDICINE
Payer: MEDICAID

## 2021-02-15 VITALS
DIASTOLIC BLOOD PRESSURE: 91 MMHG | RESPIRATION RATE: 15 BRPM | HEART RATE: 92 BPM | SYSTOLIC BLOOD PRESSURE: 148 MMHG | TEMPERATURE: 97.8 F | OXYGEN SATURATION: 96 %

## 2021-02-15 DIAGNOSIS — R11.2 NON-INTRACTABLE VOMITING WITH NAUSEA, UNSPECIFIED VOMITING TYPE: ICD-10-CM

## 2021-02-15 LAB
ALBUMIN SERPL-MCNC: 4.7 G/DL (ref 3.4–5)
ALP SERPL-CCNC: 94 U/L (ref 40–150)
ALT SERPL W P-5'-P-CCNC: 50 U/L (ref 0–70)
ANION GAP SERPL CALCULATED.3IONS-SCNC: 4 MMOL/L (ref 3–14)
AST SERPL W P-5'-P-CCNC: 22 U/L (ref 0–45)
BASOPHILS # BLD AUTO: 0 10E9/L (ref 0–0.2)
BASOPHILS NFR BLD AUTO: 0.3 %
BILIRUB SERPL-MCNC: 0.8 MG/DL (ref 0.2–1.3)
BUN SERPL-MCNC: 10 MG/DL (ref 7–30)
CALCIUM SERPL-MCNC: 9.7 MG/DL (ref 8.5–10.1)
CHLORIDE SERPL-SCNC: 107 MMOL/L (ref 94–109)
CO2 SERPL-SCNC: 29 MMOL/L (ref 20–32)
CREAT SERPL-MCNC: 1.01 MG/DL (ref 0.66–1.25)
DIFFERENTIAL METHOD BLD: ABNORMAL
EOSINOPHIL # BLD AUTO: 0 10E9/L (ref 0–0.7)
EOSINOPHIL NFR BLD AUTO: 0.1 %
ERYTHROCYTE [DISTWIDTH] IN BLOOD BY AUTOMATED COUNT: 12.1 % (ref 10–15)
GFR SERPL CREATININE-BSD FRML MDRD: >90 ML/MIN/{1.73_M2}
GLUCOSE SERPL-MCNC: 107 MG/DL (ref 70–99)
HCT VFR BLD AUTO: 46.4 % (ref 40–53)
HGB BLD-MCNC: 16.6 G/DL (ref 13.3–17.7)
IMM GRANULOCYTES # BLD: 0 10E9/L (ref 0–0.4)
IMM GRANULOCYTES NFR BLD: 0.3 %
LYMPHOCYTES # BLD AUTO: 1.3 10E9/L (ref 0.8–5.3)
LYMPHOCYTES NFR BLD AUTO: 10.9 %
MCH RBC QN AUTO: 29.5 PG (ref 26.5–33)
MCHC RBC AUTO-ENTMCNC: 35.8 G/DL (ref 31.5–36.5)
MCV RBC AUTO: 82 FL (ref 78–100)
MONOCYTES # BLD AUTO: 0.6 10E9/L (ref 0–1.3)
MONOCYTES NFR BLD AUTO: 4.9 %
NEUTROPHILS # BLD AUTO: 9.8 10E9/L (ref 1.6–8.3)
NEUTROPHILS NFR BLD AUTO: 83.5 %
NRBC # BLD AUTO: 0 10*3/UL
NRBC BLD AUTO-RTO: 0 /100
PLATELET # BLD AUTO: 221 10E9/L (ref 150–450)
POTASSIUM SERPL-SCNC: 4.2 MMOL/L (ref 3.4–5.3)
PROT SERPL-MCNC: 8.4 G/DL (ref 6.8–8.8)
RBC # BLD AUTO: 5.63 10E12/L (ref 4.4–5.9)
SODIUM SERPL-SCNC: 140 MMOL/L (ref 133–144)
WBC # BLD AUTO: 11.8 10E9/L (ref 4–11)

## 2021-02-15 PROCEDURE — 250N000012 HC RX MED GY IP 250 OP 636 PS 637: Performed by: EMERGENCY MEDICINE

## 2021-02-15 PROCEDURE — 96375 TX/PRO/DX INJ NEW DRUG ADDON: CPT

## 2021-02-15 PROCEDURE — 99285 EMERGENCY DEPT VISIT HI MDM: CPT | Mod: 25

## 2021-02-15 PROCEDURE — 96372 THER/PROPH/DIAG INJ SC/IM: CPT | Performed by: EMERGENCY MEDICINE

## 2021-02-15 PROCEDURE — 258N000003 HC RX IP 258 OP 636: Performed by: EMERGENCY MEDICINE

## 2021-02-15 PROCEDURE — 96374 THER/PROPH/DIAG INJ IV PUSH: CPT

## 2021-02-15 PROCEDURE — 250N000011 HC RX IP 250 OP 636: Performed by: EMERGENCY MEDICINE

## 2021-02-15 PROCEDURE — 96361 HYDRATE IV INFUSION ADD-ON: CPT

## 2021-02-15 PROCEDURE — 80053 COMPREHEN METABOLIC PANEL: CPT | Performed by: EMERGENCY MEDICINE

## 2021-02-15 PROCEDURE — 85025 COMPLETE CBC W/AUTO DIFF WBC: CPT | Performed by: EMERGENCY MEDICINE

## 2021-02-15 RX ORDER — SUMATRIPTAN 6 MG/.5ML
6 INJECTION, SOLUTION SUBCUTANEOUS ONCE
Status: COMPLETED | OUTPATIENT
Start: 2021-02-15 | End: 2021-02-15

## 2021-02-15 RX ORDER — HALOPERIDOL 5 MG/ML
2 INJECTION INTRAMUSCULAR ONCE
Status: COMPLETED | OUTPATIENT
Start: 2021-02-15 | End: 2021-02-15

## 2021-02-15 RX ORDER — ONDANSETRON 2 MG/ML
4 INJECTION INTRAMUSCULAR; INTRAVENOUS EVERY 30 MIN PRN
Status: DISCONTINUED | OUTPATIENT
Start: 2021-02-15 | End: 2021-02-15 | Stop reason: HOSPADM

## 2021-02-15 RX ORDER — SODIUM CHLORIDE 9 MG/ML
INJECTION, SOLUTION INTRAVENOUS CONTINUOUS
Status: DISCONTINUED | OUTPATIENT
Start: 2021-02-15 | End: 2021-02-15 | Stop reason: HOSPADM

## 2021-02-15 RX ADMIN — ONDANSETRON 4 MG: 2 INJECTION INTRAMUSCULAR; INTRAVENOUS at 13:15

## 2021-02-15 RX ADMIN — HALOPERIDOL LACTATE 2 MG: 5 INJECTION, SOLUTION INTRAMUSCULAR at 14:46

## 2021-02-15 RX ADMIN — SODIUM CHLORIDE 1000 ML: 9 INJECTION, SOLUTION INTRAVENOUS at 13:15

## 2021-02-15 RX ADMIN — SUMATRIPTAN SUCCINATE 6 MG: 6 INJECTION SUBCUTANEOUS at 13:20

## 2021-02-15 RX ADMIN — SODIUM CHLORIDE 1000 ML: 9 INJECTION, SOLUTION INTRAVENOUS at 14:46

## 2021-02-15 ASSESSMENT — ENCOUNTER SYMPTOMS
ABDOMINAL PAIN: 1
VOMITING: 1
CHILLS: 0
COUGH: 0
NAUSEA: 1
FEVER: 0
BACK PAIN: 0
DIARRHEA: 0

## 2021-02-15 NOTE — ED PROVIDER NOTES
History   Chief Complaint:  Nausea & Vomiting       HPI   Ravi Dunne is a 30 year old male with history of cyclic vomiting syndrome, autism, and anxiety who presents to the emergency department with nausea and vomiting.  He started complaining to his mother that he felt unwell last evening.  This morning he woke up and started vomiting at about 930.  He reports upper abdominal pain.  No diarrhea.  He has been urinating and has to go to the bathroom now.  He has had multiple episodes of vomiting syndrome in the past.  His mother reports that he was diagnosed with this condition at 3 years old and they thought that he would grow up out of it.  Unfortunately he has continued to have periodic symptoms.  In the past he has received antinausea medications and was eventually discharged.  He denies any fevers, chills or cough.  He denies any associated back pain.    Review of Systems   Constitutional: Negative for chills and fever.   Respiratory: Negative for cough.    Gastrointestinal: Positive for abdominal pain, nausea and vomiting. Negative for diarrhea.   Musculoskeletal: Negative for back pain.   All other systems reviewed and are negative.      Allergies:  Sulfa Drugs      Medications:  Lexapro  Levothyroxine  Omeprazole  Trileptal  Seroquel      Past Medical History:    Anxiety  Autism  Autoimmune hypothyroidism  Cyclic vomiting syndrome  Development delay  Hyperlipidemia  Mood disorder       Past Surgical History:    The patient denies past surgical history.        Family History:    Alcoholism  Throat cancer      Social History:  Accompanied by mother.    Physical Exam     Patient Vitals for the past 24 hrs:   BP Temp Temp src Pulse Resp SpO2   02/15/21 1238 (!) 152/91 97.6  F (36.4  C) Temporal 99 18 97 %       Physical Exam  Eyes:  The pupils are equal and round    Conjunctivae and sclerae are normal  ENT:    The nose is normal    Pinnae are normal    The oropharynx is dry  CV:  Regular rate and rhythm      No edema  Resp:  Lungs are clear    Non-labored    No rales    No wheezing   GI:  Abdomen is soft without tenderness, there is no rigidity    No distension    No rebound tenderness   MS:  Normal muscular tone    No asymmetric leg swelling  Skin:  No rash or acute skin lesions noted  Neuro:   Awake, alert.      Speech is normal and fluent.    Face is symmetric.     Moves all extremities      Emergency Department Course     Laboratory:  CBC: WBC 11.8 (H), HGB 16.6,    CMP: Glucose 107 (H) (Creatinine: 1.01) o/w WNL      Emergency Department Course:    Reviewed:  I reviewed nursing notes, vitals, past medical history and care everywhere    Assessments:  1243 I obtained history and examined the patient as noted above.   1528 I rechecked the patient and explained findings.     Interventions:  1315 NS 1000 mL IV  1315 Zofran 4 mg IV  1320 Imitrex 6 mg Subcutaneous  1446 Haldol 2 mg IV  1446 NS 1000 mL IV    Disposition:  The patient was discharged to home.     Impression & Plan     Medical Decision Making:  Ravi Dunne is a 30 year old male with history of cyclic vomiting syndrome as well as autism who presents to the emergency department with vomiting.  He started feeling unwell last evening and then started vomiting this morning at about 930.  He has had frequent episodes of vomiting.  He complained of pain but on exam he does not appear to have any tenderness.  Laboratory work-up was obtained which showed a slight leukocytosis.  He was given IV fluids, Zofran and Imitrex which seemed to help on previous visits.  He did not note significant improvement after those medications and so Haldol was given IV which did provide significant improvement of his symptoms and requested to be discharged home.  At this time without any significant electrolyte abnormalities and resolution of his symptoms I think that this is appropriate.  His mother was in agreement and he was discharged home.  Return with any new or  worrisome symptoms.    Diagnosis:    ICD-10-CM    1. Non-intractable vomiting with nausea, unspecified vomiting type  R11.2        Discharge Medications:  New Prescriptions    No medications on file       Scribe Disclosure:  I, Abbie Cabello, am serving as a scribe at 12:42 PM on 2/15/2021 to document services personally performed by Ernie Richard MD based on my observations and the provider's statements to me.      Ernie Richard MD  02/15/21 1851

## 2021-02-16 ENCOUNTER — OFFICE VISIT (OUTPATIENT)
Dept: INTERNAL MEDICINE | Facility: CLINIC | Age: 31
End: 2021-02-16
Payer: MEDICAID

## 2021-02-16 VITALS
BODY MASS INDEX: 26.67 KG/M2 | TEMPERATURE: 99.3 F | OXYGEN SATURATION: 97 % | SYSTOLIC BLOOD PRESSURE: 150 MMHG | HEIGHT: 76 IN | DIASTOLIC BLOOD PRESSURE: 92 MMHG | WEIGHT: 219 LBS | HEART RATE: 92 BPM

## 2021-02-16 DIAGNOSIS — F84.0 AUTISM: ICD-10-CM

## 2021-02-16 DIAGNOSIS — R11.15 CYCLIC VOMITING SYNDROME: Primary | ICD-10-CM

## 2021-02-16 DIAGNOSIS — R30.0 DYSURIA: ICD-10-CM

## 2021-02-16 DIAGNOSIS — F41.9 ANXIETY: ICD-10-CM

## 2021-02-16 LAB
ALBUMIN UR-MCNC: NEGATIVE MG/DL
APPEARANCE UR: CLEAR
BILIRUB UR QL STRIP: NEGATIVE
COLOR UR AUTO: YELLOW
GLUCOSE UR STRIP-MCNC: NEGATIVE MG/DL
HGB UR QL STRIP: NEGATIVE
KETONES UR STRIP-MCNC: NEGATIVE MG/DL
LEUKOCYTE ESTERASE UR QL STRIP: NEGATIVE
NITRATE UR QL: NEGATIVE
PH UR STRIP: 5.5 PH (ref 5–7)
SOURCE: NORMAL
SP GR UR STRIP: 1.01 (ref 1–1.03)
UROBILINOGEN UR STRIP-ACNC: 0.2 EU/DL (ref 0.2–1)

## 2021-02-16 PROCEDURE — 81003 URINALYSIS AUTO W/O SCOPE: CPT | Performed by: INTERNAL MEDICINE

## 2021-02-16 PROCEDURE — 99213 OFFICE O/P EST LOW 20 MIN: CPT | Performed by: INTERNAL MEDICINE

## 2021-02-16 ASSESSMENT — MIFFLIN-ST. JEOR: SCORE: 2044.88

## 2021-02-16 NOTE — PATIENT INSTRUCTIONS
*  You have recovered from the most recent episode of cyclical vomiting syndrome.     *  Use the ondansetron dissolvable tablets 1-2 tablets as needed.     *  Try Imitrex nasal spray as needed for acute vomiting episodes.     *  Referral to Gastroenterology Clinic to see if there are any treatments available for the cyclic vomiting syndrome.      --[facility] [phone] (fax [phone])          COVID VACCINE:     Get the Covid 19 vaccine whenever and wherever you see it available.      As of 2/8/21, [facility] is vaccinating individual over age 75 and health care workers.      [facility] is beginning to dispense the Covid Vaccine at the following vaccine clinic locations according to the current guidelines and priority groups established by the [place] Dept of [facility].  (I cannot override any of these criteria and get you a vaccine earlier than you are due to receive it)    Patients may try to schedule Covid vaccine appointments at any of our Covid Vaccine clinics through [email or web address], or central scheduling [address] [place] [address] or a dedicated number ([phone]).   Schedules may fill up quickly depending on vaccine availability, but newer spots will continually open, so check every day.    **The Covid vaccine appointments for the week are opened on Tuesday morning at 800 am**    (We get our supply for the week on Monday which will determine how many vaccines can be given.    Please log onto [email or web address] around that time and try and schedule a vaccine at your convenience.   If you do not get an appointment time, then check back the following Tuesday or during the week for any cancellations.    You only need to schedule the initial Covid vaccine appointment, they will schedule the second vaccine before you leave after the first one.     For the most up-to-date information regarding Covid vaccination, check with the [facility] website ([email or web address]) or  [facility] Health website.    This is a fluid situation, with rapidly changing variables, mostly dictated by available vaccine supply.     As we continue to move through priority groups and more individuals are eligible for vaccination, we will circulate messaging about the Covid Vaccine to the general public through email, social media, and [employer].    Individuals who are not in the priority groups (of healthcare personnel and individuals 75 and older) should go into [employer] and update your account information. Make sure to opt in to email and text message communication.    --[facility] Location ([address] [place] [address]) at the [facility] and [facility] - NOT at the [facility] [address]  --[place] Location (at [facility], NOT at the clinic)  --[place] Location ([facility] on the [facility])              --[place] Location - [facility] (NOT the [facility]):  --[facility] Location ([address] [place] [address])  --[facility]

## 2021-02-16 NOTE — PROGRESS NOTES
"    Assessment & Plan     Cyclic vomiting syndrome  Another episode  Has zofran ODT, reglan, imitrex nasal spray at home, has never really worked.   Refer to GI for further evaluation and recommendations.   Hard to say if these episode are triggered by anxiety as they happen more often when he is going to his day programs.   No other obviosu triggers.   - GASTROENTEROLOGY ADULT REF CONSULT ONLY; Future    Dysuria  No evidence for bacterial infection based on exam and history,  no antibiotics indicated.  Most liekly from lots of IVF receivied uyesterday  - *UA reflex to Microscopic and Culture ([place] [facility] [place] [facility] (except [place] and [place])    Autism  contineus cares from psychiatrist.     Anxiety                 BMI:   Estimated body mass index is 26.66 kg/m  as calculated from the following:    Height as of this encounter: 1.93 m (6' 4\").    Weight as of this encounter: 99.3 kg (219 lb).           Return in about 6 months (around 8/16/2021) for Annual physical.    Bhaskar Gandara MD  [facility]    Emma Rodrigues is a 30 year old who presents for the following health issues  accompanied by his mother (who provides most o the history due to patient's autism):    HPI       ED/UC Followup:    Facility:  [facility] ED  Date of visit: 2/15/2021  Reason for visit: non-intractable vomiting with nausea   Current Status: vomiting has subsided but now having pain with urination     anoter episodes of cycli c vomiting, necessitating visit to ER>   Usual meds ( zofrna, imitrex, etc) did not work.   Given V Haldol with resolution of sx, hard to say if the haldol did it or if he just resovle as he does.     Ate breafast today, no problems, no further nauea or vomiting now.        Reports mild dysuria last night and today, mild stinging with urination and more frequent urination since returnign marina from ER yesterday  No hematuria, no problems passing " "urine, just more uncomfortable.   recievied 2 l IVF in ER.     **I reviewed the information recorded in the patient's EPIC chart (including but not limited to medical history, surgical history, family history, problem list, medication list, and allergy list) and updated the information as indicated based on the patients reported information.         Review of Systems   Constitutional, HEENT, cardiovascular, pulmonary, gi and gu systems are negative, except as otherwise noted.      Objective    BP (!) 150/92   Pulse 92   Temp 99.3  F (37.4  C) (Temporal)   Ht 1.93 m (6' 4\")   Wt 99.3 kg (219 lb)   SpO2 97%   BMI 26.66 kg/m    Body mass index is 26.66 kg/m .  Physical Exam   Physical exam deferred today.      Results for orders placed or performed in visit on 02/16/21   *UA reflex to Microscopic and Culture ([place] [facility] [place] [facility] (except [place] and [place])     Status: None    Specimen: Midstream Urine   Result Value Ref Range    Color Urine Yellow     Appearance Urine Clear     Glucose Urine Negative NEG^Negative mg/dL    Bilirubin Urine Negative NEG^Negative    Ketones Urine Negative NEG^Negative mg/dL    Specific Gravity Urine 1.010 1.003 - 1.035    Blood Urine Negative NEG^Negative    pH Urine 5.5 5.0 - 7.0 pH    Protein Albumin Urine Negative NEG^Negative mg/dL    Urobilinogen Urine 0.2 0.2 - 1.0 EU/dL    Nitrite Urine Negative NEG^Negative    Leukocyte Esterase Urine Negative NEG^Negative    Source Midstream Urine                 "

## 2021-02-21 ENCOUNTER — HOSPITAL ENCOUNTER (EMERGENCY)
Facility: CLINIC | Age: 31
Discharge: HOME | End: 2021-02-21
Attending: NURSE PRACTITIONER | Admitting: NURSE PRACTITIONER
Payer: MEDICAID

## 2021-02-21 VITALS
HEART RATE: 62 BPM | SYSTOLIC BLOOD PRESSURE: 118 MMHG | DIASTOLIC BLOOD PRESSURE: 76 MMHG | RESPIRATION RATE: 18 BRPM | TEMPERATURE: 97.5 F | OXYGEN SATURATION: 99 %

## 2021-02-21 DIAGNOSIS — R11.10 NON-INTRACTABLE VOMITING: ICD-10-CM

## 2021-02-21 LAB
ALBUMIN SERPL-MCNC: 4.6 G/DL (ref 3.4–5)
ALP SERPL-CCNC: 84 U/L (ref 40–150)
ALT SERPL W P-5'-P-CCNC: 38 U/L (ref 0–70)
ANION GAP SERPL CALCULATED.3IONS-SCNC: 5 MMOL/L (ref 3–14)
AST SERPL W P-5'-P-CCNC: 16 U/L (ref 0–45)
BASOPHILS # BLD AUTO: 0 10E9/L (ref 0–0.2)
BASOPHILS NFR BLD AUTO: 0.4 %
BILIRUB SERPL-MCNC: 0.5 MG/DL (ref 0.2–1.3)
BUN SERPL-MCNC: 8 MG/DL (ref 7–30)
CALCIUM SERPL-MCNC: 9.5 MG/DL (ref 8.5–10.1)
CHLORIDE SERPL-SCNC: 108 MMOL/L (ref 94–109)
CO2 SERPL-SCNC: 27 MMOL/L (ref 20–32)
CREAT SERPL-MCNC: 0.9 MG/DL (ref 0.66–1.25)
DIFFERENTIAL METHOD BLD: NORMAL
EOSINOPHIL # BLD AUTO: 0.1 10E9/L (ref 0–0.7)
EOSINOPHIL NFR BLD AUTO: 1.1 %
ERYTHROCYTE [DISTWIDTH] IN BLOOD BY AUTOMATED COUNT: 11.9 % (ref 10–15)
GFR SERPL CREATININE-BSD FRML MDRD: >90 ML/MIN/{1.73_M2}
GLUCOSE SERPL-MCNC: 108 MG/DL (ref 70–99)
HCT VFR BLD AUTO: 43.4 % (ref 40–53)
HGB BLD-MCNC: 15.4 G/DL (ref 13.3–17.7)
IMM GRANULOCYTES # BLD: 0 10E9/L (ref 0–0.4)
IMM GRANULOCYTES NFR BLD: 0.3 %
LIPASE SERPL-CCNC: 109 U/L (ref 73–393)
LYMPHOCYTES # BLD AUTO: 2.4 10E9/L (ref 0.8–5.3)
LYMPHOCYTES NFR BLD AUTO: 24 %
MCH RBC QN AUTO: 29 PG (ref 26.5–33)
MCHC RBC AUTO-ENTMCNC: 35.5 G/DL (ref 31.5–36.5)
MCV RBC AUTO: 82 FL (ref 78–100)
MONOCYTES # BLD AUTO: 0.5 10E9/L (ref 0–1.3)
MONOCYTES NFR BLD AUTO: 4.6 %
NEUTROPHILS # BLD AUTO: 7 10E9/L (ref 1.6–8.3)
NEUTROPHILS NFR BLD AUTO: 69.6 %
NRBC # BLD AUTO: 0 10*3/UL
NRBC BLD AUTO-RTO: 0 /100
PLATELET # BLD AUTO: 243 10E9/L (ref 150–450)
POTASSIUM SERPL-SCNC: 3.6 MMOL/L (ref 3.4–5.3)
PROT SERPL-MCNC: 7.8 G/DL (ref 6.8–8.8)
RBC # BLD AUTO: 5.31 10E12/L (ref 4.4–5.9)
SODIUM SERPL-SCNC: 140 MMOL/L (ref 133–144)
WBC # BLD AUTO: 10.1 10E9/L (ref 4–11)

## 2021-02-21 PROCEDURE — 96374 THER/PROPH/DIAG INJ IV PUSH: CPT

## 2021-02-21 PROCEDURE — 258N000003 HC RX IP 258 OP 636: Performed by: NURSE PRACTITIONER

## 2021-02-21 PROCEDURE — 80053 COMPREHEN METABOLIC PANEL: CPT | Performed by: NURSE PRACTITIONER

## 2021-02-21 PROCEDURE — 99285 EMERGENCY DEPT VISIT HI MDM: CPT | Mod: 25

## 2021-02-21 PROCEDURE — 96375 TX/PRO/DX INJ NEW DRUG ADDON: CPT

## 2021-02-21 PROCEDURE — 250N000011 HC RX IP 250 OP 636: Performed by: NURSE PRACTITIONER

## 2021-02-21 PROCEDURE — 96361 HYDRATE IV INFUSION ADD-ON: CPT

## 2021-02-21 PROCEDURE — 85025 COMPLETE CBC W/AUTO DIFF WBC: CPT | Performed by: NURSE PRACTITIONER

## 2021-02-21 PROCEDURE — 83690 ASSAY OF LIPASE: CPT | Performed by: NURSE PRACTITIONER

## 2021-02-21 RX ORDER — OXYCODONE AND ACETAMINOPHEN 5; 325 MG/1; MG/1
1 TABLET ORAL EVERY 4 HOURS PRN
Qty: 10 TABLET | Refills: 0 | Status: SHIPPED | OUTPATIENT
Start: 2021-02-21 | End: 2021-02-21

## 2021-02-21 RX ORDER — ONDANSETRON 2 MG/ML
4 INJECTION INTRAMUSCULAR; INTRAVENOUS ONCE
Status: COMPLETED | OUTPATIENT
Start: 2021-02-21 | End: 2021-02-21

## 2021-02-21 RX ORDER — CEPHALEXIN 500 MG/1
500 CAPSULE ORAL 4 TIMES DAILY
Qty: 28 CAPSULE | Refills: 0 | Status: SHIPPED | OUTPATIENT
Start: 2021-02-21 | End: 2021-02-21

## 2021-02-21 RX ORDER — LORAZEPAM 2 MG/ML
0.5 INJECTION INTRAMUSCULAR ONCE
Status: COMPLETED | OUTPATIENT
Start: 2021-02-21 | End: 2021-02-21

## 2021-02-21 RX ADMIN — ONDANSETRON 4 MG: 2 INJECTION INTRAMUSCULAR; INTRAVENOUS at 21:08

## 2021-02-21 RX ADMIN — SODIUM CHLORIDE 1000 ML: 9 INJECTION, SOLUTION INTRAVENOUS at 21:04

## 2021-02-21 RX ADMIN — LORAZEPAM 0.5 MG: 2 INJECTION INTRAMUSCULAR; INTRAVENOUS at 22:50

## 2021-02-21 ASSESSMENT — ENCOUNTER SYMPTOMS
APPETITE CHANGE: 1
NAUSEA: 1
ABDOMINAL PAIN: 0
VOMITING: 1

## 2021-02-22 ENCOUNTER — PATIENT OUTREACH (OUTPATIENT)
Dept: CARE COORDINATION | Facility: CLINIC | Age: 31
End: 2021-02-22

## 2021-02-22 DIAGNOSIS — Z71.89 OTHER SPECIFIED COUNSELING: ICD-10-CM

## 2021-02-22 NOTE — ED TRIAGE NOTES
"Mother reports that pt was seen a week ago for the samething. Mother states that pt \"knows\" when he is about to have cyclical vomiting episodes. Mother states that tonight he felt like he was going to have said episode.   "

## 2021-02-22 NOTE — ED PROVIDER NOTES
"  History   Chief Complaint:  Nausea & Vomiting     The history is provided by a relative.      Ravi Dunne is a 30 year old male with history of autism and cyclical vomiting who presents with nausea and vomiting.  The patient was seen in the emergency department on 2/15/2021 with a negative work-up and felt improved.  He was then seen for follow-up at his primary care on 2/16/2021 and was referred to gastroenterology.  This appointment is scheduled for March 5.  The patient has previously tried Zofran, Reglan, Imitrex without improvement and notes these symptoms have been present since age 3.  His mother states that he \"knows\" when he is about to have cyclical vomiting episodes. He was given Zofran for the nausea, but he has felt super anxious since, and he normally does eat anything if he feels sick. His mother notes that his urine was orange a week ago but then it went back to normal.  He was evaluated at that time and had a negative urinalysis.  He denies any abdominal pain and there has been no blood in his emesis.    Review of Systems   Constitutional: Positive for appetite change.   Gastrointestinal: Positive for nausea and vomiting (without blood). Negative for abdominal pain.     Allergies:  Sulfa Drugs    Medications:  Zyrtec  Lexapro  Levothyroxine  Reglan  Prilosec  Zofran  Oxcarbazepine  Seroquel  Imitrex    Past Medical History:    Anxiety  Autism  Autoimmune hypothyroidism  Cyclic vomiting syndrome  Developmental delay  Hyperlipidemia  Mood disorder  Autism    Family History:    Father: Alcoholism, Throat cancer  Brother: Autism spectrum disorder    Social History:  The patient was accompanied to the ED by his mother.    Physical Exam     Patient Vitals for the past 24 hrs:   BP Temp Temp src Pulse Resp SpO2   02/21/21 2200 125/75 -- -- 64 -- --   02/21/21 2135 -- -- -- -- -- 97 %   02/21/21 2050 (!) 128/90 97.5  F (36.4  C) Oral 94 16 97 %       Physical Exam  Nursing notes reviewed. Vitals " reviewed.  General: Alert. Well kept.  Sitting on the edge of the bed holding emesis bucket.  Eyes:  Conjunctiva non-injected, non-icteric.  Neck/Throat: Moist mucous membranes.  Normal voice.  Cardiac: Regular rhythm. Normal heart sounds with no murmur/rubs/click.   Pulmonary: Clear and equal breath sounds bilaterally. No crackles/rales. No wheezing  Abdomen: Soft. Non-distended. Non-tender to palpation. No masses. No guarding or rebound.  Musculoskeletal: Normal gross range of motion of all 4 extremities.    Neurological: Alert and oriented x person, place, situation.   Skin: Warm and dry without rashes or petechiae. Normal appearance of visualized exposed skin.  Psych: Good eye contact.    Emergency Department Course     Laboratory:  CBC: WBC 10.1, HGB 15.4,   CMP: Glucose 108 (H) o/w WNL (Creatinine 0.90)    Lipase: 109    Emergency Department Course:  Reviewed:  nursing notes, vitals, past medical history and care everywhere    Assessments:  2049: I performed an exam of the patient and obtained history, as documented above.     2159: I rechecked and updated the patient with the plan for care.    2219: I rechecked and updated the patient.    2321: I rechecked and updated the patient who is feeling better.    Interventions:  2104 NS 1L IV Bolus  2108 Zofran 4mg IV  2250 Ativan 0.5mg IV    Disposition:  The patient was discharged to home.     Impression & Plan     Medical Decision Making:  Ravi Dunne is a 30 year old male, with a history of cyclical vomiting, who presents to the emergency department for evaluation of nausea and vomiting.  His vitals are within normal limits and stable. On exam, patient is well appearing. he is alert, oriented and his neurologic exam is nonfocal. Cardiopulmonary exam is unremarkable. Abdomen is soft and nontender throughout.  Laboratories were obtained and showed no acute abnormality and he has no leukocytosis.  He was treated with IV fluids and Zofran and on recheck  had dramatic improvement in his symptoms.  When I discussed he was ready for discharge the patient noted he again felt unwell and was concerned that he was going to throw up.  He was given Ativan and had complete resolution in his symptoms.  Given the patient's abdomen is soft and nontender throughout, I do not feel this is consistent with a surgical abdomen or requires CT imaging or US. Given that the patient is well appearing and has had resolution of his vomiting and nausea, I do feel it is reasonable for him to be discharged to home. However I did discuss with the patient that I recommended close follow-up with PCP for repeat evaluation and with gastroenterology as scheduled. He and his mother is in understanding and agreement. Return instructions were given. He was discharged to home in stable/improved condition.      Diagnosis:    ICD-10-CM    1. Non-intractable vomiting  R11.10        Scribe Disclosure:  I, Robert Cochran, am serving as a scribe at 8:47 PM on 2/21/2021 to document services personally performed by Yazmin Love, GIDEON based on my observations and the provider's statements to me.      Yazmin Love, CNP  02/22/21 0123

## 2021-02-22 NOTE — LETTER
[facility] CARE COORDINATION  [address] [facility] [address]      February 24, 2021      Ravi Dunne  [address] [facility] [address]      Dear Ravi,    I am a clinic community health worker who works with Bhaskar Gandara MD at [facility]. I have been trying to reach you recently to introduce Clinic Care Coordination and to see if there was anything I could assist you with.  Below is a description of clinic care coordination and how I can further assist you.      The clinic care coordination team is made up of a registered nurse,  and community health worker who understand the health care system. The goal of clinic care coordination is to help you manage your health and improve access to the health care system in the most efficient manner. The team can assist you in meeting your health care goals by providing education, coordinating services, strengthening the communication among your providers and supporting you with any resource needs.    Please feel free to contact me at [phone] with any questions or concerns. We are focused on providing you with the highest-quality healthcare experience possible and that all starts with you.     Sincerely,     Luz Scott, ROBERTO  Clinic Care Coordination  [facility]: [facility], [place], [place], Women's, and [facility]  Phone: [phone]

## 2021-02-22 NOTE — DISCHARGE INSTRUCTIONS
Discharge Instructions  Vomiting    You have been seen today for vomiting (throwing up). This is usually caused by a virus, but some bacteria, parasites, medicines or other medical conditions can cause similar symptoms. At this time your provider does not find that your vomiting is a sign of anything dangerous or life-threatening. However, sometimes the signs of serious illness do not show up right away. If you have new or worse symptoms, you may need to be seen again in the Emergency Department or by your primary provider. Remember that serious problems like appendicitis can start as vomiting.    Generally, every Emergency Department visit should have a follow-up clinic visit with either a primary or a specialty clinic/provider. Please follow-up as instructed by your emergency provider today.    Return to the Emergency Department if:  You keep vomiting and you are not able to keep liquids down.   You feel you are getting dehydrated, such as being very thirsty, not urinating (peeing) at least every 8-12 hours, or feeling faint or lightheaded.   You develop a new fever, or your fever continues for more than 2 days.   You have abdominal (belly pain) that seems worse than cramps, is in one spot, or is getting worse over time. Appendicitis usually causes pain in the right lower abdomen (to the right and below your belly button) so watch for pain in this location.  You have blood in your vomit or stools.   You feel very weak.  You are not starting to improve within 24 hours of your visit here.     What can I do to help myself?  The most important thing to do is to drink clear liquids. If you have been vomiting a lot, it is best to have only small, frequent sips of liquids. Drinking too much at once may cause more vomiting. If you are vomiting often, you must replace minerals, sodium and potassium lost with your illness. Pedialyte  is the best available rehydration liquid but some find that it doesn t taste good so  sports drinks are an alterative. You can also drink clear liquids such as water, weak tea, apple juice, and 7-Up . Avoid acid liquids (orange), caffeine (coffee) or alcohol. Do not drink milk until you no longer have diarrhea (loose stools).   After liquids are staying down, you may start eating mild foods. Soda crackers, toast, plain noodles, gelatin, applesauce and bananas are good first choices. Avoid foods that have acid, are spicy, fatty or have a lot of fiber (such as meats, coarse grains, vegetables). You may start eating these foods again in about 3 days when you are better.   Sometimes treatment includes prescription medicine to prevent nausea (sick to your stomach) and vomiting. If your provider prescribes these for you, take them as directed.   Do not take ibuprofen, naproxen, or other nonsteroidal anti-inflammatory (NSAID) medicines without checking with your healthcare provider.     If you were given a prescription for medicine here today, be sure to read all of the information (including the package insert) that comes with your prescription.  This will include important information about the medicine, its side effects, and any warnings that you need to know about.  The pharmacist who fills the prescription can provide more information and answer questions you may have about the medicine.  If you have questions or concerns that the pharmacist cannot address, please call or return to the Emergency Department.     Remember that you can always come back to the Emergency Department if you are not able to see your regular provider in the amount of time listed above, if you get any new symptoms, or if there is anything that worries you.

## 2021-02-22 NOTE — PROGRESS NOTES
Clinic Care Coordination Contact  [facility]/Voicemail    Referral Source: ED Follow-Up  Clinical Data: Care Coordinator Outreach    Chart Review: Referral from a discharge report.  [facility] 2/21/2021  ED for Nausea & Vomiting  Medication Changes- None  Follow up with PCP    Outreach attempted x 1.  Could not leave a message on patient's voicemail with call back information and requested return call.  Voicemail not available.  Plan:  Care Coordinator will try to reach patient again in 1-2 business days.    ROBERTO Almonte  Clinic Care Coordination  [facility]: [place], [place], Women's, and [facility]  Phone: [phone]

## 2021-02-22 NOTE — ED NOTES
Assumed care at this time.  Sol Higginbotham RN,.......................................... 2/21/2021   11:14 PM

## 2021-02-24 NOTE — PROGRESS NOTES
Clinic Care Coordination Contact  [facility]/Voicemail    Referral Source: ED Follow-Up  Clinical Data: Care Coordinator Outreach    Chart Review: Referral from a discharge report.  [facility] 2/21/2021  ED for Nausea & Vomiting  Medication Changes- None  Follow up with PCP     Outreach attempted x 2.  Left message on patient's voicemail with call back information and requested return call.  Plan: Care Coordinator will send care coordination introduction letter with care coordinator contact information and explanation of care coordination services via [employer]. Care Coordinator will do no further outreaches at this time.    ROBERTO Almonte  Clinic Care Coordination  [facility]: [place], [place], Women's, and [facility]  Phone: [phone]

## 2021-03-02 ENCOUNTER — DOCUMENTATION ONLY (OUTPATIENT)
Dept: OTHER | Facility: CLINIC | Age: 31
End: 2021-03-02

## 2021-03-14 ENCOUNTER — HEALTH MAINTENANCE LETTER (OUTPATIENT)
Age: 31
End: 2021-03-14

## 2021-03-23 ENCOUNTER — TRANSFERRED RECORDS (OUTPATIENT)
Dept: HEALTH INFORMATION MANAGEMENT | Facility: CLINIC | Age: 31
End: 2021-03-23

## 2021-03-31 ENCOUNTER — HOSPITAL ENCOUNTER (EMERGENCY)
Facility: CLINIC | Age: 31
Discharge: HOME | End: 2021-04-01
Attending: EMERGENCY MEDICINE | Admitting: EMERGENCY MEDICINE
Payer: MEDICAID

## 2021-03-31 VITALS
SYSTOLIC BLOOD PRESSURE: 155 MMHG | OXYGEN SATURATION: 99 % | RESPIRATION RATE: 18 BRPM | HEART RATE: 83 BPM | TEMPERATURE: 98 F | DIASTOLIC BLOOD PRESSURE: 84 MMHG

## 2021-03-31 DIAGNOSIS — R11.2 NON-INTRACTABLE VOMITING WITH NAUSEA, UNSPECIFIED VOMITING TYPE: ICD-10-CM

## 2021-03-31 LAB
ALBUMIN SERPL-MCNC: 4.6 G/DL (ref 3.4–5)
ALP SERPL-CCNC: 77 U/L (ref 40–150)
ALT SERPL W P-5'-P-CCNC: 33 U/L (ref 0–70)
ANION GAP SERPL CALCULATED.3IONS-SCNC: 4 MMOL/L (ref 3–14)
AST SERPL W P-5'-P-CCNC: 17 U/L (ref 0–45)
BASOPHILS # BLD AUTO: 0 10E9/L (ref 0–0.2)
BASOPHILS NFR BLD AUTO: 0.3 %
BILIRUB SERPL-MCNC: 0.5 MG/DL (ref 0.2–1.3)
BUN SERPL-MCNC: 7 MG/DL (ref 7–30)
CALCIUM SERPL-MCNC: 9.1 MG/DL (ref 8.5–10.1)
CHLORIDE SERPL-SCNC: 100 MMOL/L (ref 94–109)
CO2 SERPL-SCNC: 27 MMOL/L (ref 20–32)
CREAT SERPL-MCNC: 0.98 MG/DL (ref 0.66–1.25)
DIFFERENTIAL METHOD BLD: NORMAL
EOSINOPHIL # BLD AUTO: 0.1 10E9/L (ref 0–0.7)
EOSINOPHIL NFR BLD AUTO: 0.7 %
ERYTHROCYTE [DISTWIDTH] IN BLOOD BY AUTOMATED COUNT: 12 % (ref 10–15)
GFR SERPL CREATININE-BSD FRML MDRD: >90 ML/MIN/{1.73_M2}
GLUCOSE SERPL-MCNC: 101 MG/DL (ref 70–99)
HCT VFR BLD AUTO: 41.5 % (ref 40–53)
HGB BLD-MCNC: 15 G/DL (ref 13.3–17.7)
IMM GRANULOCYTES # BLD: 0 10E9/L (ref 0–0.4)
IMM GRANULOCYTES NFR BLD: 0.3 %
LIPASE SERPL-CCNC: 90 U/L (ref 73–393)
LYMPHOCYTES # BLD AUTO: 1.8 10E9/L (ref 0.8–5.3)
LYMPHOCYTES NFR BLD AUTO: 17.2 %
MCH RBC QN AUTO: 29.9 PG (ref 26.5–33)
MCHC RBC AUTO-ENTMCNC: 36.1 G/DL (ref 31.5–36.5)
MCV RBC AUTO: 83 FL (ref 78–100)
MONOCYTES # BLD AUTO: 0.4 10E9/L (ref 0–1.3)
MONOCYTES NFR BLD AUTO: 4.1 %
NEUTROPHILS # BLD AUTO: 8.3 10E9/L (ref 1.6–8.3)
NEUTROPHILS NFR BLD AUTO: 77.4 %
NRBC # BLD AUTO: 0 10*3/UL
NRBC BLD AUTO-RTO: 0 /100
PLATELET # BLD AUTO: 211 10E9/L (ref 150–450)
POTASSIUM SERPL-SCNC: 3.7 MMOL/L (ref 3.4–5.3)
PROT SERPL-MCNC: 7.5 G/DL (ref 6.8–8.8)
RBC # BLD AUTO: 5.02 10E12/L (ref 4.4–5.9)
SODIUM SERPL-SCNC: 131 MMOL/L (ref 133–144)
WBC # BLD AUTO: 10.7 10E9/L (ref 4–11)

## 2021-03-31 PROCEDURE — 96361 HYDRATE IV INFUSION ADD-ON: CPT

## 2021-03-31 PROCEDURE — 85025 COMPLETE CBC W/AUTO DIFF WBC: CPT | Performed by: EMERGENCY MEDICINE

## 2021-03-31 PROCEDURE — 250N000011 HC RX IP 250 OP 636: Performed by: EMERGENCY MEDICINE

## 2021-03-31 PROCEDURE — 96375 TX/PRO/DX INJ NEW DRUG ADDON: CPT

## 2021-03-31 PROCEDURE — 99285 EMERGENCY DEPT VISIT HI MDM: CPT | Mod: 25

## 2021-03-31 PROCEDURE — 258N000003 HC RX IP 258 OP 636: Performed by: EMERGENCY MEDICINE

## 2021-03-31 PROCEDURE — 96374 THER/PROPH/DIAG INJ IV PUSH: CPT

## 2021-03-31 PROCEDURE — 80053 COMPREHEN METABOLIC PANEL: CPT | Performed by: EMERGENCY MEDICINE

## 2021-03-31 PROCEDURE — 83690 ASSAY OF LIPASE: CPT | Performed by: EMERGENCY MEDICINE

## 2021-03-31 RX ORDER — ONDANSETRON 2 MG/ML
4 INJECTION INTRAMUSCULAR; INTRAVENOUS ONCE
Status: COMPLETED | OUTPATIENT
Start: 2021-03-31 | End: 2021-03-31

## 2021-03-31 RX ADMIN — SODIUM CHLORIDE 1000 ML: 9 INJECTION, SOLUTION INTRAVENOUS at 21:24

## 2021-03-31 RX ADMIN — ONDANSETRON 4 MG: 2 INJECTION INTRAMUSCULAR; INTRAVENOUS at 21:26

## 2021-04-01 ENCOUNTER — PATIENT OUTREACH (OUTPATIENT)
Dept: CARE COORDINATION | Facility: CLINIC | Age: 31
End: 2021-04-01

## 2021-04-01 DIAGNOSIS — Z71.89 OTHER SPECIFIED COUNSELING: ICD-10-CM

## 2021-04-01 PROCEDURE — 250N000011 HC RX IP 250 OP 636: Performed by: EMERGENCY MEDICINE

## 2021-04-01 RX ORDER — LORAZEPAM 2 MG/ML
0.5 INJECTION INTRAMUSCULAR ONCE
Status: COMPLETED | OUTPATIENT
Start: 2021-04-01 | End: 2021-04-01

## 2021-04-01 RX ADMIN — LORAZEPAM 0.5 MG: 2 INJECTION INTRAMUSCULAR; INTRAVENOUS at 01:45

## 2021-04-01 ASSESSMENT — ENCOUNTER SYMPTOMS
FEVER: 0
NAUSEA: 1
ABDOMINAL PAIN: 0
VOMITING: 1
COUGH: 0
SHORTNESS OF BREATH: 0

## 2021-04-01 NOTE — PROGRESS NOTES
Clinic Care Coordination Contact  [facility]/Voicemail    Referral Source: ED Follow-Up  Clinical Data: Care Coordinator Outreach    Chart Review: Referral from a discharge.  [facility] 3/31/20  ED for Nausea & Vomiting  Medication Changes- No  Follow up with PCP    Outreach attempted x 1.  Left message on patient's voicemail with call back information and requested return call.  Plan:Care Coordinator will try to reach patient again in 1-2 business days.    ROBERTO Almonte  Clinic Care Coordination  [facility]: [place], and [facility]  Phone: [phone]

## 2021-04-01 NOTE — ED PROVIDER NOTES
History   Chief Complaint:  Nausea & Vomiting       The history is provided by a parent.      Ravi Dunne is a 30 year old male with history of cyclic vomiting syndrome, autism, anxiety and hyperlipidemia who presents with nausea and vomiting. Symptoms began this evening when he told his Mother he needed to go the hospital. Per his Mother, she is unsure what triggered this episode, but he was listing to music and became hyperactive, additionally he was eating popcorn which could have caused him constipation. Of note, he had an endoscopy on the 23rd and they are working with digestive health.     Review of Systems   Constitutional: Negative for fever.   Respiratory: Negative for cough and shortness of breath.    Cardiovascular: Negative for chest pain.   Gastrointestinal: Positive for nausea and vomiting. Negative for abdominal pain.   All other systems reviewed and are negative.        Allergies:  Sulfa Drugs    Medications:  Cetirizine   Escitalopram   Levothyroxine  Reglan  Prilosec  Zofran   Trileptal   Seroquel     Past Medical History:    Anxiety  Autism  Autoimmune hypothyroidism  Cyclic vomiting syndrome  Development delay  Hyperlipidemia  Mood disorder     Family History:    Father: Alcoholism, Throat cancer  Brother: Autism spectrum disorder      Social History:  Accompanied by mother.  PCP:  Bhaskar Gandara    Physical Exam     Patient Vitals for the past 24 hrs:   BP Temp Temp src Pulse Resp SpO2   03/31/21 2116 (!) 155/84 98  F (36.7  C) Oral 83 18 99 %       Physical Exam  General: Appears well-developed and well-nourished.   Head: No signs of trauma.   CV: Normal rate and regular rhythm.    Resp: Effort normal and breath sounds normal. No respiratory distress.   GI: Soft. There is no tenderness.  No rebound or guarding.  Normal bowel sounds.  No CVA tenderness.  MSK: Normal range of motion.  Neuro: The patient is alert and oriented. Speech normal.  Skin: Skin is warm and dry. No rash  noted.   Psych: calm      Emergency Department Course     Laboratory:  CBC: WBC 10.7, HGB 15.0,    CMP:  (L), Glucose 101 (H), Creatinine 0.98 o/w WNL  Lipase: 90    Emergency Department Course:    Reviewed:  I reviewed nursing notes, vitals, past medical history and care everywhere    Assessments:  2353 I obtained history and examined the patient as noted above.   0218 I rechecked the patient and explained findings.     Interventions:  2124 NS, 1 L, IV   2126 Zofran 4 mg IV   0145 Ativan 0.5mg IV     Disposition:  The patient was discharged to home.     Impression & Plan     Medical Decision Making:  Ravi Dunne is a 30-year-old gentleman who presents due to nausea and vomiting.  He has a history of repeated episodes of vomiting and his current presentation is typical for him.  Mother states that she had not given him any of the nausea medication at home.  On my evaluation his exam was overall benign.  Blood work was overall reassuring and he was given the above medications with improvement of his symptoms did not have any further episodes of vomiting in the ER.  Patient was discharged home with recommendation to continue with outpatient management and I also encouraged the use of the home antiemetics as needed.      Diagnosis:    ICD-10-CM    1. Non-intractable vomiting with nausea, unspecified vomiting type  R11.2        Discharge Medications:  Discharge Medication List as of 4/1/2021  2:23 AM          Scribe Disclosure:  I, Favian Benoit, am serving as a scribe at 11:53 PM on 3/31/2021 to document services personally performed by Matthew San MD based on my observations and the provider's statements to me.          Matthew San MD  04/01/21 5972

## 2021-04-02 ENCOUNTER — PATIENT OUTREACH (OUTPATIENT)
Dept: NURSING | Facility: CLINIC | Age: 31
End: 2021-04-02
Payer: MEDICAID

## 2021-04-02 NOTE — PROGRESS NOTES
Clinic Care Coordination Contact  Community Health Worker Initial Outreach    CHW Initial Information Gathering:  Referral Source: ED Follow-Up  Current living arrangement:: Not Assessed  Community Resources: PCA  Informal Support system:: Family, Friends, Parent  No PCP office visit in Past Year: No  CHW Additional Questions  If ED/Hospital discharge, follow-up appointment scheduled as recommended?: Yes  Medication changes made following ED/Hospital discharge?: No  MyChart active?: Yes  Patient sent Social Determinants of Health questionnaire?: No    Patient accepts CC: No    Spoke to Sol(Mother)  CHW introduced self and intent of call regarding recent ED visit.  Sol acknowledged stating that Ravi is doing well, he did not get sick this time around.  Sol has continued to give medications as instructed and has been really monitoring him today.  CHW confirmed that patient has his follow up appointment scheduled with PCP on 04/09/21.    CHW introduced roles with Care Coordination and assessed if any resources or support was needed at this time.  However, Sol did not have any concerns, but wondered how she could get him scheduled for COVID vaccine.  Since patient has an underlying condition, patient should be able to schedule via MyChart or call the scheduling line.  CHW provided Sol with scheduling line for vaccine.  No other needs at this time.    ROBERTO Sosa  Clinic Care Coordination  [facility] : [place], [place], and [place]  Phone: [phone]

## 2021-04-09 ENCOUNTER — OFFICE VISIT (OUTPATIENT)
Dept: INTERNAL MEDICINE | Facility: CLINIC | Age: 31
End: 2021-04-09
Payer: MEDICAID

## 2021-04-09 VITALS
DIASTOLIC BLOOD PRESSURE: 86 MMHG | WEIGHT: 210.6 LBS | HEIGHT: 76 IN | HEART RATE: 60 BPM | SYSTOLIC BLOOD PRESSURE: 122 MMHG | OXYGEN SATURATION: 98 % | BODY MASS INDEX: 25.65 KG/M2 | TEMPERATURE: 97.9 F

## 2021-04-09 DIAGNOSIS — F41.9 ANXIETY: ICD-10-CM

## 2021-04-09 DIAGNOSIS — R11.15 CYCLIC VOMITING SYNDROME: Primary | ICD-10-CM

## 2021-04-09 DIAGNOSIS — E06.3 AUTOIMMUNE HYPOTHYROIDISM: ICD-10-CM

## 2021-04-09 DIAGNOSIS — F84.0 AUTISM: ICD-10-CM

## 2021-04-09 PROCEDURE — 99214 OFFICE O/P EST MOD 30 MIN: CPT | Performed by: INTERNAL MEDICINE

## 2021-04-09 ASSESSMENT — MIFFLIN-ST. JEOR: SCORE: 2006.78

## 2021-04-09 NOTE — PROGRESS NOTES
"    Assessment & Plan     (R11.15) Cyclic vomiting syndrome  (primary encounter diagnosis)  Comment: another episode  I suspect that these episodes are probably primarily triggered by acute anxiety and/or behavioral changes.   He asks if he can go to the hospital, asks if he needs IVs.  He likes going to the hospital.   Asked mom to look into the events leading up to these episodes and see if there are any correlations with life events or behavioral changes.   She will be meeting with his psychiatrist for routine visit, asked them to discuss other options for anxiety management when it appears he is heading that direction.   Had no ER visits March 2019-May 2020  Plan:     (F41.9) Anxiety  Comment: as above, contiune all meds.   Discuss better plan for acute anxiety management as needed.   Plan:     (F84.0) Autism  Comment: autism makes it difficult to assess his behaviors and mood.   conitnue same plans.   conitnue to create stable routines.     He does get overly concerned about adverse weather, storms, tornado siren testing.    Plan:     (E06.3) Autoimmune hypothyroidism  Comment: This condition is currently controlled on the current medical regimen.  Continue current therapy. Plan:    Review of prior external note(s) from - Outside records from [facility]         BMI:   Estimated body mass index is 25.64 kg/m  as calculated from the following:    Height as of this encounter: 1.93 m (6' 4\").    Weight as of this encounter: 95.5 kg (210 lb 9.6 oz).           Return in about 6 months (around 10/9/2021) for Annual physical.    Bhaskar Gandara MD  [facility]    Emma Rodrigues is a 30 year old who presents for the following health issues  accompanied by his mother:    HPI     ED/UC Followup:    Facility:  [facility] ED  Date of visit: 3/31/2021-4/1/2021  Reason for visit: non-intractable vomiting with nausea   Current Status: sx have improved     Reviewed " "charts, no ER visits March 2019-May 2020.   6 visist since May 2020.   No clear triggers for these dates, reviewed each date phil clarke.   The last episode he ate 2 large bags of microwave popcorn and watched an intense movie about storms.  He is overly anxisou about severe weather and the tornado sirens checked every Wednesday.    No other obvious or significant new items in his llife or routine.   Currently eating well, normal Bms.   No nausea, no vomiting.      1.  Long standing anxiety and autism.     Taking all meds as ordered, followed closely by psychiatrist.      He does get overly anxious and concerned about adverse weather, storms, tornado siren testing.      2.  History of hypothyroidism.  On replacement therapy.  He has not experienced any significant side effects of this medication.   The patient denies of fatigue, weight changes, heat/cold intolerance, hair changes, nail changes, bowel changes.     Latest labs reviewed:    Lab Results   Component Value Date    TSH 0.45 07/06/2020    TSH 0.38 04/03/2019    TSH 0.80 05/02/2018    TSH 1.98 11/15/2017    TSH 8.48 08/11/2017    TSH 7.47 02/14/2017    TSH 1.17 06/24/2014    TSH 2.80 02/02/2010    TSH 2.52 04/23/2009           **I reviewed the information recorded in the patient's EPIC chart (including but not limited to medical history, surgical history, family history, problem list, medication list, and allergy list) and updated the information as indicated based on the patients reported information.         Review of Systems   Constitutional, HEENT, cardiovascular, pulmonary, gi and gu systems are negative, except as otherwise noted.      Objective    /86   Pulse 60   Temp 97.9  F (36.6  C) (Temporal)   Ht 1.93 m (6' 4\")   Wt 95.5 kg (210 lb 9.6 oz)   SpO2 98%   BMI 25.64 kg/m    Body mass index is 25.64 kg/m .  Physical Exam     GENERAL alert and no distress  EYES:  Normal sclera,conjunctiva, EOMI  HENT: facies symmetric  MS: extremities- no " gross deformities of the visible extremities noted,   EXT:  no lower extremity edema  PSYCH: Alert and oriented times 3; speech- coherent  SKIN:  No obvious significant skin lesions on visible portions of face

## 2021-04-09 NOTE — PATIENT INSTRUCTIONS
*  Get the Covid vaccine whenever and wherever able.  I will check on getting it here at our pharmacy for you.      *  Pay attention to any behavioral triggers that may play a role in these episodes.      Acute cyclic vomiting Episodes requiring ER visits in the past 2 years:  3/31/21 Wed  2/21/21 Sun  2/15/21 Mon  10/3/20 Sat  5/12-13/20 Tues-Wed  3/24/19 Sun    *  If there is clear anxiety leading up to these episodes, consider taking an extra dose of Quetiapine if needed.    *  Discuss this with your psychiatrist.      *  Continue all medications at the same doses.  Contact your usual pharmacy if you need refills.     *  Return to see me in September/October 2021, sooner if needed.  Use [employer] or Call [phone] to schedule the appointment with me.

## 2021-05-03 ENCOUNTER — VIRTUAL VISIT (OUTPATIENT)
Dept: INTERNAL MEDICINE | Facility: CLINIC | Age: 31
End: 2021-05-03
Payer: MEDICAID

## 2021-05-03 DIAGNOSIS — F84.0 AUTISM: ICD-10-CM

## 2021-05-03 DIAGNOSIS — R11.15 CYCLIC VOMITING SYNDROME: Primary | ICD-10-CM

## 2021-05-03 PROCEDURE — 99214 OFFICE O/P EST MOD 30 MIN: CPT | Mod: 95 | Performed by: INTERNAL MEDICINE

## 2021-05-03 RX ORDER — HYDROXYZINE HYDROCHLORIDE 25 MG/1
25-50 TABLET, FILM COATED ORAL EVERY 8 HOURS PRN
Qty: 100 TABLET | Refills: 5 | Status: SHIPPED | OUTPATIENT
Start: 2021-05-03 | End: 2023-05-17

## 2021-05-03 NOTE — PATIENT INSTRUCTIONS
*I wonder if many of the cyclical vomiting symptoms may be more triggered by anxiety or other emotional circumstances.    *Ravi has come to view the emergency room as a safe place where he is given a lot of attention.      *Trial of hydroxyzine, 25 or 50 mg 2 or 3 times a day as needed for acute anxiety.    *The upper endoscopy performed last month was completely normal, showing no abnormalities including no ulcers, celiac disease or gastritis.    *  Continue all other medications at the same doses.  Contact your usual pharmacy if you need refills.     *Pay special attention to the effect of certain foods on the gastrointestinal symptoms, especially any dairy, and gluten (any food made from wheat).  These are the 2 most common foods associated with irritable bowel syndrome, reflux, and chronic stomach issues.    *Follow-up with psychiatrist as planned this week.    *Follow-up with me in a virtual visit in approximately 2 months, sooner if needed for anything else

## 2021-05-03 NOTE — PROGRESS NOTES
Ravi is a 30 year old who is being evaluated via a billable video visit.      How would you like to obtain your AVS? MyChart  If the video visit is dropped, the invitation should be resent by: Text to cell phone: [phone]  Will anyone else be joining your video visit? No    Video Start Time:     Assessment & Plan       (R11.15) Cyclic vomiting syndrome  (primary encounter diagnosis)  Comment:   *I wonder if many of the cyclical vomiting symptoms may be more triggered by anxiety or other emotional circumstances.    *Ravi has come to view the emergency room as a safe place where he is given a lot of attention.      *Trial of hydroxyzine, 25 or 50 mg 2 or 3 times a day as needed for acute anxiety.    *The upper endoscopy performed last month was completely normal, showing no abnormalities including no ulcers, celiac disease or gastritis.    *  Continue all other medications at the same doses.  Contact your usual pharmacy if you need refills.     *Pay special attention to the effect of certain foods on the gastrointestinal symptoms, especially any dairy, and gluten (any food made from wheat).  These are the 2 most common foods associated with irritable bowel syndrome, reflux, and chronic stomach issues.    *Follow-up with psychiatrist as planned this week.    *Follow-up with me in a virtual visit in approximately 2 months, sooner if needed for anything else    Plan: hydrOXYzine (ATARAX) 25 MG tablet            (F84.0) Autism  Comment:   Plan: hydrOXYzine (ATARAX) 25 MG tablet             *I wonder if many of the cyclical vomiting symptoms may be more triggered by anxiety or other emotional circumstances.    *Ravi has come to view the emergency room as a safe place where he is given a lot of attention.      *Trial of hydroxyzine, 25 or 50 mg 2 or 3 times a day as needed for acute anxiety.    *The upper endoscopy performed last month was completely normal, showing no abnormalities including no ulcers, celiac  "disease or gastritis.    *  Continue all other medications at the same doses.  Contact your usual pharmacy if you need refills.     *Pay special attention to the effect of certain foods on the gastrointestinal symptoms, especially any dairy, and gluten (any food made from wheat).  These are the 2 most common foods associated with irritable bowel syndrome, reflux, and chronic stomach issues.    *Follow-up with psychiatrist as planned this week.    *Follow-up with me in a virtual visit in approximately 2 months, sooner if needed for anything else               BMI:   Estimated body mass index is 25.64 kg/m  as calculated from the following:    Height as of 4/9/21: 1.93 m (6' 4\").    Weight as of 4/9/21: 95.5 kg (210 lb 9.6 oz).           Return in about 2 months (around 7/3/2021) for Virtual visit (video or phone).    Bhaskar Gandara MD  [facility] JOBY Rodrigues is a 30 year old who presents for the following health issues  accompanied by his mother:    HPI     Concern - Nausea   Onset: years  Description: vomiting and nausea, abdominal pain, diarrhea, decreased appetite   Intensity: mild to moderate  Progression of Symptoms:  worsening and waxing and waning  Accompanying Signs & Symptoms: none  Previous history of similar problem: hx of cyclic vomiting syndrome   Precipitating factors:        Worsened by: n/a  Alleviating factors:        Improved by: zofran  Therapies tried and outcome: zofran - helping with sx      **I reviewed the information recorded in the patient's EPIC chart (including but not limited to medical history, surgical history, family history, problem list, medication list, and allergy list) and updated the information as indicated based on the patients reported information.         Review of Systems   Constitutional, HEENT, cardiovascular, pulmonary, gi and gu systems are negative, except as otherwise noted.      Objective           Vitals:  No " vitals were obtained today due to virtual visit.    Physical Exam   GENERAL: Healthy, alert and no distress  EYES: Eyes grossly normal to inspection.  No discharge or erythema, or obvious scleral/conjunctival abnormalities.  RESP: No audible wheeze, cough, or visible cyanosis.  No visible retractions or increased work of breathing.    SKIN: Visible skin clear. No significant rash, abnormal pigmentation or lesions.  NEURO: Cranial nerves grossly intact.  Mentation and speech appropriate for age.  PSYCH: Mentation appears normal, affect normal/bright, judgement and insight intact, normal speech and appearance well-groomed.                Video-Visit Details    Type of service:  Video Visit    Video sttart:  11:01  Video End Time:11:18 AM    Originating Location (pt. Location): Home    Distant Location (provider location):  [facility]     Platform used for Video Visit: Jose Juan

## 2021-05-26 ENCOUNTER — TELEPHONE (OUTPATIENT)
Dept: INTERNAL MEDICINE | Facility: CLINIC | Age: 31
End: 2021-05-26

## 2021-05-26 DIAGNOSIS — R11.15 CYCLIC VOMITING SYNDROME: ICD-10-CM

## 2021-05-26 DIAGNOSIS — R62.50 DEVELOPMENT DELAY: Primary | ICD-10-CM

## 2021-05-26 DIAGNOSIS — R13.10 DYSPHAGIA, UNSPECIFIED TYPE: ICD-10-CM

## 2021-05-26 NOTE — TELEPHONE ENCOUNTER
Dr. Gandara - Please see message below and sign speech therapy referral if appropriate.    Thanks!  RAMESH Nicole

## 2021-05-26 NOTE — TELEPHONE ENCOUNTER
Reason for Call:  Referral     Detailed comments: patients Mom is requesting Speech therapy referral. Please call her to discuss    Phone Number Patient can be reached at: Home number on file [phone] (home)    Best Time: anytime    Can we leave a detailed message on this number? YES    Call taken on 5/26/2021 at 12:05 PM by Adeline Gandara

## 2021-06-01 ENCOUNTER — TRANSFERRED RECORDS (OUTPATIENT)
Dept: HEALTH INFORMATION MANAGEMENT | Facility: CLINIC | Age: 31
End: 2021-06-01

## 2021-06-02 ENCOUNTER — HOSPITAL ENCOUNTER (EMERGENCY)
Facility: CLINIC | Age: 31
Discharge: HOME | End: 2021-06-02
Attending: EMERGENCY MEDICINE | Admitting: EMERGENCY MEDICINE
Payer: MEDICAID

## 2021-06-02 VITALS
HEART RATE: 79 BPM | TEMPERATURE: 97.6 F | RESPIRATION RATE: 15 BRPM | OXYGEN SATURATION: 99 % | DIASTOLIC BLOOD PRESSURE: 78 MMHG | SYSTOLIC BLOOD PRESSURE: 139 MMHG

## 2021-06-02 DIAGNOSIS — R11.15 CYCLIC VOMITING SYNDROME: ICD-10-CM

## 2021-06-02 LAB
ALBUMIN SERPL-MCNC: 4.4 G/DL (ref 3.4–5)
ALP SERPL-CCNC: 74 U/L (ref 40–150)
ALT SERPL W P-5'-P-CCNC: 30 U/L (ref 0–70)
ANION GAP SERPL CALCULATED.3IONS-SCNC: 7 MMOL/L (ref 3–14)
AST SERPL W P-5'-P-CCNC: 16 U/L (ref 0–45)
BASOPHILS # BLD AUTO: 0 10E9/L (ref 0–0.2)
BASOPHILS NFR BLD AUTO: 0.5 %
BILIRUB SERPL-MCNC: 0.9 MG/DL (ref 0.2–1.3)
BUN SERPL-MCNC: 7 MG/DL (ref 7–30)
CALCIUM SERPL-MCNC: 9.4 MG/DL (ref 8.5–10.1)
CHLORIDE SERPL-SCNC: 100 MMOL/L (ref 94–109)
CO2 SERPL-SCNC: 26 MMOL/L (ref 20–32)
CREAT SERPL-MCNC: 0.97 MG/DL (ref 0.66–1.25)
DIFFERENTIAL METHOD BLD: NORMAL
EOSINOPHIL # BLD AUTO: 0.1 10E9/L (ref 0–0.7)
EOSINOPHIL NFR BLD AUTO: 0.7 %
ERYTHROCYTE [DISTWIDTH] IN BLOOD BY AUTOMATED COUNT: 11.6 % (ref 10–15)
GFR SERPL CREATININE-BSD FRML MDRD: >90 ML/MIN/{1.73_M2}
GLUCOSE SERPL-MCNC: 95 MG/DL (ref 70–99)
HCT VFR BLD AUTO: 41.6 % (ref 40–53)
HGB BLD-MCNC: 14.6 G/DL (ref 13.3–17.7)
IMM GRANULOCYTES # BLD: 0 10E9/L (ref 0–0.4)
IMM GRANULOCYTES NFR BLD: 0.4 %
LACTATE BLD-SCNC: 1 MMOL/L (ref 0.7–2)
LYMPHOCYTES # BLD AUTO: 2.7 10E9/L (ref 0.8–5.3)
LYMPHOCYTES NFR BLD AUTO: 33.3 %
MCH RBC QN AUTO: 28.7 PG (ref 26.5–33)
MCHC RBC AUTO-ENTMCNC: 35.1 G/DL (ref 31.5–36.5)
MCV RBC AUTO: 82 FL (ref 78–100)
MONOCYTES # BLD AUTO: 0.5 10E9/L (ref 0–1.3)
MONOCYTES NFR BLD AUTO: 6.4 %
NEUTROPHILS # BLD AUTO: 4.7 10E9/L (ref 1.6–8.3)
NEUTROPHILS NFR BLD AUTO: 58.7 %
NRBC # BLD AUTO: 0 10*3/UL
NRBC BLD AUTO-RTO: 0 /100
PLATELET # BLD AUTO: 221 10E9/L (ref 150–450)
POTASSIUM SERPL-SCNC: 3.6 MMOL/L (ref 3.4–5.3)
PROT SERPL-MCNC: 7.8 G/DL (ref 6.8–8.8)
RBC # BLD AUTO: 5.08 10E12/L (ref 4.4–5.9)
SODIUM SERPL-SCNC: 133 MMOL/L (ref 133–144)
TROPONIN I SERPL-MCNC: <0.015 UG/L (ref 0–0.04)
WBC # BLD AUTO: 8.1 10E9/L (ref 4–11)

## 2021-06-02 PROCEDURE — 83605 ASSAY OF LACTIC ACID: CPT | Performed by: EMERGENCY MEDICINE

## 2021-06-02 PROCEDURE — 258N000003 HC RX IP 258 OP 636: Performed by: EMERGENCY MEDICINE

## 2021-06-02 PROCEDURE — 85025 COMPLETE CBC W/AUTO DIFF WBC: CPT | Performed by: EMERGENCY MEDICINE

## 2021-06-02 PROCEDURE — 96374 THER/PROPH/DIAG INJ IV PUSH: CPT

## 2021-06-02 PROCEDURE — 99285 EMERGENCY DEPT VISIT HI MDM: CPT | Mod: 25

## 2021-06-02 PROCEDURE — 96361 HYDRATE IV INFUSION ADD-ON: CPT

## 2021-06-02 PROCEDURE — 84484 ASSAY OF TROPONIN QUANT: CPT | Performed by: EMERGENCY MEDICINE

## 2021-06-02 PROCEDURE — 80053 COMPREHEN METABOLIC PANEL: CPT | Performed by: EMERGENCY MEDICINE

## 2021-06-02 PROCEDURE — 250N000013 HC RX MED GY IP 250 OP 250 PS 637: Performed by: EMERGENCY MEDICINE

## 2021-06-02 PROCEDURE — 96375 TX/PRO/DX INJ NEW DRUG ADDON: CPT

## 2021-06-02 PROCEDURE — 250N000011 HC RX IP 250 OP 636: Performed by: EMERGENCY MEDICINE

## 2021-06-02 RX ORDER — LORAZEPAM 2 MG/ML
0.5 INJECTION INTRAMUSCULAR ONCE
Status: COMPLETED | OUTPATIENT
Start: 2021-06-02 | End: 2021-06-02

## 2021-06-02 RX ORDER — ACETAMINOPHEN 325 MG/1
650 TABLET ORAL ONCE
Status: COMPLETED | OUTPATIENT
Start: 2021-06-02 | End: 2021-06-02

## 2021-06-02 RX ORDER — ONDANSETRON 2 MG/ML
4 INJECTION INTRAMUSCULAR; INTRAVENOUS ONCE
Status: COMPLETED | OUTPATIENT
Start: 2021-06-02 | End: 2021-06-02

## 2021-06-02 RX ADMIN — ACETAMINOPHEN 650 MG: 325 TABLET, FILM COATED ORAL at 23:15

## 2021-06-02 RX ADMIN — SODIUM CHLORIDE 1000 ML: 9 INJECTION, SOLUTION INTRAVENOUS at 22:25

## 2021-06-02 RX ADMIN — LORAZEPAM 0.5 MG: 2 INJECTION INTRAMUSCULAR; INTRAVENOUS at 22:26

## 2021-06-02 RX ADMIN — ONDANSETRON 4 MG: 2 INJECTION INTRAMUSCULAR; INTRAVENOUS at 22:25

## 2021-06-02 ASSESSMENT — ENCOUNTER SYMPTOMS
VOMITING: 1
NAUSEA: 1
ABDOMINAL PAIN: 1

## 2021-06-03 ENCOUNTER — PATIENT OUTREACH (OUTPATIENT)
Dept: CARE COORDINATION | Facility: CLINIC | Age: 31
End: 2021-06-03

## 2021-06-03 DIAGNOSIS — Z71.89 OTHER SPECIFIED COUNSELING: ICD-10-CM

## 2021-06-03 NOTE — PROGRESS NOTES
Clinic Care Coordination Contact  [facility]/Voicemail       Clinical Data: Care Coordinator Outreach  Outreach attempted x 1.  Unable to leave message on patient's voicemail with call back information and requested return call   Plan:Care Coordinator will try to reach patient again in 1-2 business days.

## 2021-06-03 NOTE — LETTER
[facility] CARE COORDINATION  [address] [facility] [address]    June 4, 2021    Ravi Dunne  [address] [place] [facility] [address]      Dear Ravi,    I am a clinic community health worker who works with Bhaskar Gandara MD at [facility]. I have been trying to reach you recently to introduce Clinic Care Coordination and to see if there was anything I could assist you with.  Below is a description of clinic care coordination and how I can further assist you.      The clinic care coordination team is made up of a registered nurse,  and community health worker who understand the health care system. The goal of clinic care coordination is to help you manage your health and improve access to the health care system in the most efficient manner. The team can assist you in meeting your health care goals by providing education, coordinating services, strengthening the communication among your providers and supporting you with any resource needs.    Please feel free to contact the Community Health Worker at [phone] with any questions or concerns. We are focused on providing you with the highest-quality healthcare experience possible and that all starts with you.     Sincerely,     Kristie Gandara   Community Health Worker   Ph: [phone]  Fx: [phone]

## 2021-06-03 NOTE — ED TRIAGE NOTES
[facility]  ED Arrival Note    Arrives through triage. A &O X4. ABC's intact. Pt arrives with c/o of nausea, vomiting, and abdominal pain. Mother, who is the patient's guardian, is with the patient. They reports symptoms started during the weekend.      Triage Interventions: IV and labs    Ambulatory: Yes    Meets Stroke Criteria?: No    Meets Trauma Criteria?: No    Directed to: Triage [place]

## 2021-06-03 NOTE — ED PROVIDER NOTES
History   Chief Complaint:  Nausea & Vomiting and Abdominal Pain    The history is provided by a parent.      Ravi Dunne is a 30 year old male with history of anxiety and autism who presents with nausea, vomiting, and abdominal pain. The mother states the patient has had chronic abdominal pain and nausea. She notes, however, his first episode of emesis today was upon arrival at the emergency department. She notes the patient had oatmeal in the morning, chicken rice soup and applesauce for lunch, and stated he wanted rotisserie chicken with mashed potatoes but then stated he did not want to eat anything which is abnormal for him.    Review of Systems   Unable to perform ROS: Patient nonverbal   Gastrointestinal: Positive for abdominal pain, nausea and vomiting.   Unable to perform full ROS due to the patient's autism    Allergies:  Sulfa Drugs    Medications:  Zyrtec  Lexapro  Atarax  Synthroid  Reglan  Prilosec  Zofran  Trileptal  Seroquel  Imitrex    Past Medical History:    Anxiety  Autism  Autoimmune hypothyroidism  Development delay  Hyperlipidemia  Mood disorder      Family History:    Father - Alcoholism, Throat Cancer  Brother - Autism    Social History:  The patient presents to the emergency department with his mother.     Physical Exam     Patient Vitals for the past 24 hrs:   BP Temp Temp src Pulse Resp SpO2   06/02/21 2042 (!) 156/87 97.6  F (36.4  C) Temporal 73 17 96 %       Physical Exam  Nursing note and vitals reviewed.  Constitutional:  Appears well-developed and well-nourished.   HENT:   Head:    Atraumatic.   Mouth/Throat:   Oropharynx is clear and moist. No oropharyngeal exudate.   Eyes:    Pupils are equal, round, and reactive to light.   Neck:    Normal range of motion. Neck supple.      No tracheal deviation present. No thyromegaly present.   Cardiovascular:  Normal rate, regular rhythm, no murmur   Pulmonary/Chest: Breath sounds are clear and equal without wheezes or  crackles.  Abdominal:   Soft. Bowel sounds are normal. Exhibits no distension and      no mass. There is no tenderness.      There is no rebound and no guarding.   Musculoskeletal:  Exhibits no edema.   Lymphadenopathy:  No cervical adenopathy.   Neurological:   Alert and oriented to person, place, and time.   Skin:    Skin is warm and dry. No rash noted. No pallor.     Emergency Department Course     Laboratory:   CBC: WBC 8.1, HGB 14.6,    CMP: AWNL (Creatinine 0.97)   Troponin (Collected 2049): <0.015  Lactic acid (result time 2101) 1.0     Emergency Department Course:    Reviewed:  I reviewed vitals and past medical history    Assessments:  2210 I obtained history and examined the patient as noted above.   2313 I rechecked the patient and explained findings to the mother.    Interventions:  2225 0.9% NaCl bolus 1000 mL IV  2225 Zofran 4 mg IV  2226 Ativan 0.5 mg IV    Disposition:  The patient was discharged to home.     Impression & Plan   CMS Diagnoses: None    Medical Decision Making:  This patient has been having intermittent cyclic vomiting which I felt was the cause of his symptoms today. He does not have any abdominal tenderness and all of his blood work was normal including normal lipase, WBC count, and liver function test, so I did not feel CT imaging was indicated at this time. He was feeling improved with the above treatment and his mother was told to start the hydroxyzine that he was prescribed and follow up with his primary care clinic or GI doctor this week. Given signs and symptoms to return for. I did not feel that there was any infectious cause for his symptoms and there was no findings concerning for appendicitis or cholecystitis or pancreatitis so I thought he could be safely discharged to home.     Diagnosis:    ICD-10-CM    1. Cyclic vomiting syndrome  R11.15      Scribe Disclosure:  I, Silvia Romero, am serving as a scribe at 9:30 PM on 6/2/2021 to document services personally  performed by Kalyn Gallo MD based on my observations and the provider's statements to me.     Kalyn Gallo MD  06/03/21 0228

## 2021-06-04 NOTE — PROGRESS NOTES
Clinic Care Coordination Contact  [facility]/Voicemail       Clinical Data: Care Coordinator Outreach  Outreach attempted x 2.  Left message on patient's voicemail with call back information and requested return call.  Plan: Care Coordinator will send care coordination introduction letter with care coordinator contact information and explanation of care coordination services via [employer]. Care Coordinator will do no further outreaches at this time.

## 2021-06-07 ENCOUNTER — PATIENT OUTREACH (OUTPATIENT)
Dept: CARE COORDINATION | Facility: CLINIC | Age: 31
End: 2021-06-07

## 2021-06-07 NOTE — PROGRESS NOTES
Clinic Care Coordination Contact  Patients mother called back about CCC. CHW called and left message and will call back tomorrow.

## 2021-06-08 NOTE — PROGRESS NOTES
Clinic Care Coordination Contact  [facility]/Voicemail       Clinical Data: Care Coordinator Outreach  Outreach attempted x 2.  Left message on patient's voicemail with call back information and requested return call.  Plan:  Care Coordinator will do no further outreaches at this time.

## 2021-06-10 ENCOUNTER — HOSPITAL ENCOUNTER (OUTPATIENT)
Dept: SPEECH THERAPY | Facility: CLINIC | Age: 31
Setting detail: THERAPIES SERIES
End: 2021-06-10
Attending: INTERNAL MEDICINE
Payer: MEDICAID

## 2021-06-10 ENCOUNTER — HOSPITAL ENCOUNTER (OUTPATIENT)
Dept: GENERAL RADIOLOGY | Facility: CLINIC | Age: 31
Discharge: HOME | End: 2021-06-10
Attending: INTERNAL MEDICINE | Admitting: INTERNAL MEDICINE
Payer: MEDICAID

## 2021-06-10 DIAGNOSIS — R13.10 DYSPHAGIA, UNSPECIFIED TYPE: ICD-10-CM

## 2021-06-10 DIAGNOSIS — R11.15 CYCLIC VOMITING SYNDROME: ICD-10-CM

## 2021-06-10 DIAGNOSIS — R62.50 DEVELOPMENT DELAY: ICD-10-CM

## 2021-06-10 PROCEDURE — 92611 MOTION FLUOROSCOPY/SWALLOW: CPT | Mod: GN | Performed by: SPEECH-LANGUAGE PATHOLOGIST

## 2021-06-10 PROCEDURE — 74230 X-RAY XM SWLNG FUNCJ C+: CPT

## 2021-06-10 NOTE — PROGRESS NOTES
06/10/21 1451   General Information   Type Of Visit Initial   Start Of Care Date 06/10/21   Referring Physician Dr. Gandara, Bhaskar Botello MD   Orders Other   Orders Comment Video swallow study   Medical Diagnosis dysphagia unspecified.   Onset Of Illness/injury Or Date Of Surgery 06/10/21  (Has been increasing for the past several months. )   Hearing WFL   Pertinent History of Current Problem/OT: Additional Occupational Profile Info Patient is a 30 year old male with a history of autism , developmental delay, anxiety and autoimmune hypothyroidism. He was referred for a video swallow study due to cyclic vomiting syndrome.    Respiratory Status Room air   Prior Level Of Function Swallowing   Prior Level Of Function Comment Consumes a regular diet and thin liquids.    Patient Role/employment History Disabled   Living Environment [place]   General Observations Pleasant and cooperative.    Patient/family Goals To find out why he is having episodes of vomiting.    General Information Comments Patient is here with his mother who is his primary caregiver.    Abuse Screen (yes response referral indicated)   Feels Unsafe at Home or Work/School no   Feels Threatened by Someone no   Does Anyone Try to Keep You From Having Contact with Others or Doing Things Outside Your Home? no   VFSS Eval: Radiology   Radiologist Dr. Burr   Views Taken left lateral   Physical Location of Procedure [facility]   VFSS Eval: Thin Liquid Texture Trial   Mode of Presentation, Thin Liquid cup;straw;self-fed   Order of Presentation 1 2 6 (some trials missed due to being out of the image).    Preparatory Phase WFL   Oral Phase, Thin Liquid WFL   Pharyngeal Phase, Thin Liquid WFL   Rosenbek's Penetration Aspiration Scale: Thin Liquid Trial Results 2 - contrast enters airway, remains above the vocal cords, no residue remains (penetration)   Diagnostic Statement One episode of minimal flash penetration without residue. Grossly WFL. Large bolus  size with multiple consecutive swallows.    VFSS Eval: Puree Solid Texture Trial   Mode of Presentation, Puree spoon;self-fed   Order of Presentation 3   Preparatory Phase WFL   Oral Phase, Puree WFL   Pharyngeal Phase, Puree WFL   Rosenbek's Penetration Aspiration Scale: Puree Food Trial Results 1 - no aspiration, contrast does not enter airway   VFSS Eval: Semisolid Texture Trial   Mode of Presentation, Semisolid spoon;self-fed   Order of Presentation 4   Preparatory Phase WFL   Oral Phase, Semisolid WFL   Pharyngeal Phase, Semisolid WFL   Rosenbek's Penetration Aspiration Scale: Semisolid Food Trial Results 1 - no aspiration, contrast does not enter airway   VFSS Eval: Solid Food Texture Trial   Mode of Presentation, Solid spoon;self-fed   Order of Presentation 5   Preparatory Phase WFL   Oral Phase, Solid WFL   Pharyngeal Phase, Solid WFL   Rosenbek's Penetration Aspiration Scale: Solid Food Trial Results 1 - no aspiration, contrast does not enter airway   Esophageal Phase of Swallow   Esophageal comments EGD completed 3/21 that was normal.    Swallow Eval: Clinical Impressions   Skilled Criteria for Therapy Intervention No problems identified which require skilled intervention   Functional Assessment Scale (FAS) 6   Dysphagia Outcome Severity Scale (SORAIDA) Level 6 - SORAIDA   Treatment Diagnosis Functional swallow   Diet texture recommendations Regular diet;Thin liquids   Recommended Feeding/Eating Techniques maintain upright posture during/after eating for 30 mins;small sips/bites  (Slow pace)   Anticipated Discharge Disposition home w/ assist   Risks and Benefits of Treatment have been explained. Yes   Patient, family and/or staff in agreement with Plan of Care Yes   Clinical Impression Comments Patient presents with an intact oral/pharyngeal swallow function. He had one episode of minimal flash penetration on his first swallow of thin liquids with large volume and consecutive swallows. There was no  penetration/aspiration or residue with pudding, semi-solids or solids. Patient may increase his risk for aspiration due to rapid eating/drinking. Recommend: 1. May continue on a regular diet and thin liquids. 2. Upright, slow pace and small bites/sips. 3. Follow up with GI as indicated. No further skilled ST needs indicated at this time.

## 2021-06-14 ENCOUNTER — OFFICE VISIT (OUTPATIENT)
Dept: INTERNAL MEDICINE | Facility: CLINIC | Age: 31
End: 2021-06-14
Payer: MEDICAID

## 2021-06-14 VITALS
HEART RATE: 85 BPM | TEMPERATURE: 98 F | OXYGEN SATURATION: 98 % | DIASTOLIC BLOOD PRESSURE: 80 MMHG | WEIGHT: 204.5 LBS | HEIGHT: 76 IN | SYSTOLIC BLOOD PRESSURE: 130 MMHG | BODY MASS INDEX: 24.9 KG/M2

## 2021-06-14 DIAGNOSIS — R11.15 CYCLIC VOMITING SYNDROME: Primary | ICD-10-CM

## 2021-06-14 DIAGNOSIS — F41.9 ANXIETY: ICD-10-CM

## 2021-06-14 PROCEDURE — 99213 OFFICE O/P EST LOW 20 MIN: CPT | Performed by: INTERNAL MEDICINE

## 2021-06-14 RX ORDER — LORAZEPAM 0.5 MG/1
0.5 TABLET ORAL 2 TIMES DAILY PRN
Qty: 15 TABLET | Refills: 0 | Status: SHIPPED | OUTPATIENT
Start: 2021-06-14 | End: 2022-06-22

## 2021-06-14 RX ORDER — ONDANSETRON 4 MG/1
4-8 TABLET, ORALLY DISINTEGRATING ORAL EVERY 8 HOURS PRN
Qty: 30 TABLET | Refills: 5 | Status: SHIPPED | OUTPATIENT
Start: 2021-06-14 | End: 2022-07-27

## 2021-06-14 ASSESSMENT — MIFFLIN-ST. JEOR: SCORE: 1979.11

## 2021-06-14 NOTE — PROGRESS NOTES
"    Assessment & Plan   Problem List Items Addressed This Visit     Anxiety    Relevant Medications    LORazepam (ATIVAN) 0.5 MG tablet    Cyclic vomiting syndrome - Primary    Relevant Medications    LORazepam (ATIVAN) 0.5 MG tablet    ondansetron (ZOFRAN-ODT) 4 MG ODT tab         * I strongly suspect that a significant trigger for these vomiting tax could be underlying increased anxiety.    *  For acute nasusea attacks:     --Ondansetron 4 or 8 mg 2-3 times per day as needed      --trial of Lorazepam 0.5 mg twice per day as needed for acute anxiety or vomiting.   Beware of drowsiness when taking this medication.     *  Continue all other medications at the same doses.  Contact your usual pharmacy if you need refills.     *  Return to see me in 6 months, sooner if needed.  Use [facility] or Call [phone] to schedule the appointment with me.                    Return in about 6 months (around 12/14/2021) for Routine Visit.    Bhaskar Gandara MD  [facility] [place]    Emma Rodrigues is a 30 year old who presents for the following health issues     HPI     ED/UC Followup:    Facility:  [facility] ED  Date of visit: 6/2/2021  Reason for visit: cyclic vomiting syndrome   Current Status: sx have improved      Reviewed emergency room notes, reviewed most recent gastroenterology notes.      Review of Systems   Constitutional, HEENT, cardiovascular, pulmonary, gi and gu systems are negative, except as otherwise noted.      Objective    /80   Pulse 85   Temp 98  F (36.7  C) (Temporal)   Ht 1.93 m (6' 4\")   Wt 92.8 kg (204 lb 8 oz)   SpO2 98%   BMI 24.89 kg/m    Body mass index is 24.89 kg/m .  Physical Exam   GENERAL alert and no distress  EYES:  Normal sclera,conjunctiva, EOMI  HENT: oral and posterior pharynx without lesions or erythema, facies symmetric  NECK: Neck supple. No LAD, without thyroidmegaly.  RESP: Clear to ausculation bilaterally without " wheezes or crackles. Normal BS in all fields.  CV: RRR normal S1S2 without murmurs, rubs or gallops.  LYMPH: no cervical lymph adenopathy appreciated  MS: extremities- no gross deformities of the visible extremities noted,   EXT:  no lower extremity edema  PSYCH: Alert and oriented times 3; speech- coherent  SKIN:  No obvious significant skin lesions on visible portions of face

## 2021-06-14 NOTE — PATIENT INSTRUCTIONS
*  For acute nasusea attacks:     --Ondansetron 4 or 8 mg 2-3 times per day as needed      --trial of Lorazepam 0.5 mg twice per day as needed for acute anxiety or vomiting.   Beware of drowsiness when taking this medication.     *  Continue all other medications at the same doses.  Contact your usual pharmacy if you need refills.     *  Return to see me in 6 months, sooner if needed.  Use [facility] or Call [phone] to schedule the appointment with me.

## 2021-06-21 ENCOUNTER — TELEPHONE (OUTPATIENT)
Dept: INTERNAL MEDICINE | Facility: CLINIC | Age: 31
End: 2021-06-21

## 2021-06-21 DIAGNOSIS — R19.7 DIARRHEA OF PRESUMED INFECTIOUS ORIGIN: Primary | ICD-10-CM

## 2021-06-21 NOTE — TELEPHONE ENCOUNTER
Dr. Gandara,     Family dog tested positive for Giardia on Friday.   Dog had been sick for a few months. Some loose stools, diarrhea. Family brought dog to vet, and tested for giardia.    Ravi's symptoms have been there for several months as well. Nausea, vomiting, diarrhea/loose stools.  Mom worried that pt could have caught this from their dog.     Wondering if Ravi should have stool testing done, to check for giardia, or any other virus/bacteria that could be causing this?    Please call mom with your advise: [phone].

## 2021-06-22 DIAGNOSIS — R19.7 DIARRHEA OF PRESUMED INFECTIOUS ORIGIN: ICD-10-CM

## 2021-06-22 PROCEDURE — 36415 COLL VENOUS BLD VENIPUNCTURE: CPT | Performed by: INTERNAL MEDICINE

## 2021-06-22 PROCEDURE — 87329 GIARDIA AG IA: CPT | Performed by: INTERNAL MEDICINE

## 2021-06-22 PROCEDURE — 87328 CRYPTOSPORIDIUM AG IA: CPT | Performed by: INTERNAL MEDICINE

## 2021-06-22 NOTE — TELEPHONE ENCOUNTER
Informed patients mother of message below. She verbalized and understanding and agrees with plan.    Zaira Yanez, A

## 2021-06-22 NOTE — TELEPHONE ENCOUNTER
OK to have stool test to check for giardia.     Future orders placed.     Have mom come in to the lab get the specimen collection kit and labelled tubes.  Return to specimen according to instructions.  I will communicate the results when available.    I think the lab will leave these at the Urgent Care

## 2021-06-24 ENCOUNTER — TELEPHONE (OUTPATIENT)
Dept: INTERNAL MEDICINE | Facility: CLINIC | Age: 31
End: 2021-06-24

## 2021-06-24 ENCOUNTER — HOSPITAL ENCOUNTER (EMERGENCY)
Facility: CLINIC | Age: 31
Discharge: HOME | End: 2021-06-24
Attending: EMERGENCY MEDICINE | Admitting: EMERGENCY MEDICINE
Payer: MEDICAID

## 2021-06-24 ENCOUNTER — APPOINTMENT (OUTPATIENT)
Dept: GENERAL RADIOLOGY | Facility: CLINIC | Age: 31
End: 2021-06-24
Attending: EMERGENCY MEDICINE
Payer: MEDICAID

## 2021-06-24 VITALS
OXYGEN SATURATION: 98 % | DIASTOLIC BLOOD PRESSURE: 75 MMHG | SYSTOLIC BLOOD PRESSURE: 148 MMHG | HEART RATE: 80 BPM | TEMPERATURE: 97.6 F | RESPIRATION RATE: 18 BRPM

## 2021-06-24 DIAGNOSIS — R11.11 NON-INTRACTABLE VOMITING WITHOUT NAUSEA, UNSPECIFIED VOMITING TYPE: ICD-10-CM

## 2021-06-24 DIAGNOSIS — R19.7 DIARRHEA, UNSPECIFIED TYPE: Primary | ICD-10-CM

## 2021-06-24 LAB
ALBUMIN SERPL-MCNC: 4.4 G/DL (ref 3.4–5)
ALP SERPL-CCNC: 78 U/L (ref 40–150)
ALT SERPL W P-5'-P-CCNC: 40 U/L (ref 0–70)
ANION GAP SERPL CALCULATED.3IONS-SCNC: 5 MMOL/L (ref 3–14)
AST SERPL W P-5'-P-CCNC: 17 U/L (ref 0–45)
BASOPHILS # BLD AUTO: 0 10E9/L (ref 0–0.2)
BASOPHILS NFR BLD AUTO: 0.4 %
BILIRUB SERPL-MCNC: 0.5 MG/DL (ref 0.2–1.3)
BUN SERPL-MCNC: 9 MG/DL (ref 7–30)
C PARVUM AG STL QL IA: NEGATIVE
CALCIUM SERPL-MCNC: 9.2 MG/DL (ref 8.5–10.1)
CHLORIDE SERPL-SCNC: 104 MMOL/L (ref 94–109)
CO2 SERPL-SCNC: 29 MMOL/L (ref 20–32)
CREAT SERPL-MCNC: 0.99 MG/DL (ref 0.66–1.25)
DIFFERENTIAL METHOD BLD: NORMAL
EOSINOPHIL # BLD AUTO: 0 10E9/L (ref 0–0.7)
EOSINOPHIL NFR BLD AUTO: 0.5 %
ERYTHROCYTE [DISTWIDTH] IN BLOOD BY AUTOMATED COUNT: 11.7 % (ref 10–15)
G LAMBLIA AG STL QL IA: NEGATIVE
GFR SERPL CREATININE-BSD FRML MDRD: >90 ML/MIN/{1.73_M2}
GLUCOSE SERPL-MCNC: 92 MG/DL (ref 70–99)
HCT VFR BLD AUTO: 42.2 % (ref 40–53)
HGB BLD-MCNC: 14.5 G/DL (ref 13.3–17.7)
IMM GRANULOCYTES # BLD: 0 10E9/L (ref 0–0.4)
IMM GRANULOCYTES NFR BLD: 0.3 %
LIPASE SERPL-CCNC: 87 U/L (ref 73–393)
LYMPHOCYTES # BLD AUTO: 1.5 10E9/L (ref 0.8–5.3)
LYMPHOCYTES NFR BLD AUTO: 18.8 %
MCH RBC QN AUTO: 28.5 PG (ref 26.5–33)
MCHC RBC AUTO-ENTMCNC: 34.4 G/DL (ref 31.5–36.5)
MCV RBC AUTO: 83 FL (ref 78–100)
MONOCYTES # BLD AUTO: 0.4 10E9/L (ref 0–1.3)
MONOCYTES NFR BLD AUTO: 5.3 %
NEUTROPHILS # BLD AUTO: 6 10E9/L (ref 1.6–8.3)
NEUTROPHILS NFR BLD AUTO: 74.7 %
NRBC # BLD AUTO: 0 10*3/UL
NRBC BLD AUTO-RTO: 0 /100
PLATELET # BLD AUTO: 183 10E9/L (ref 150–450)
POTASSIUM SERPL-SCNC: 3.9 MMOL/L (ref 3.4–5.3)
PROT SERPL-MCNC: 7.9 G/DL (ref 6.8–8.8)
RBC # BLD AUTO: 5.09 10E12/L (ref 4.4–5.9)
SODIUM SERPL-SCNC: 138 MMOL/L (ref 133–144)
SPECIMEN SOURCE: NORMAL
WBC # BLD AUTO: 8 10E9/L (ref 4–11)

## 2021-06-24 PROCEDURE — 85025 COMPLETE CBC W/AUTO DIFF WBC: CPT | Performed by: EMERGENCY MEDICINE

## 2021-06-24 PROCEDURE — 96374 THER/PROPH/DIAG INJ IV PUSH: CPT

## 2021-06-24 PROCEDURE — 99285 EMERGENCY DEPT VISIT HI MDM: CPT | Mod: 25

## 2021-06-24 PROCEDURE — 96361 HYDRATE IV INFUSION ADD-ON: CPT

## 2021-06-24 PROCEDURE — 258N000003 HC RX IP 258 OP 636: Performed by: EMERGENCY MEDICINE

## 2021-06-24 PROCEDURE — 250N000011 HC RX IP 250 OP 636: Performed by: EMERGENCY MEDICINE

## 2021-06-24 PROCEDURE — 83690 ASSAY OF LIPASE: CPT | Performed by: EMERGENCY MEDICINE

## 2021-06-24 PROCEDURE — 96375 TX/PRO/DX INJ NEW DRUG ADDON: CPT

## 2021-06-24 PROCEDURE — 74019 RADEX ABDOMEN 2 VIEWS: CPT

## 2021-06-24 PROCEDURE — 80053 COMPREHEN METABOLIC PANEL: CPT | Performed by: EMERGENCY MEDICINE

## 2021-06-24 RX ORDER — SIMETHICONE 125 MG
125 TABLET,CHEWABLE ORAL 2 TIMES DAILY
Qty: 20 TABLET | Refills: 0 | Status: SHIPPED | OUTPATIENT
Start: 2021-06-24 | End: 2023-05-17

## 2021-06-24 RX ORDER — SODIUM CHLORIDE 9 MG/ML
INJECTION, SOLUTION INTRAVENOUS CONTINUOUS
Status: DISCONTINUED | OUTPATIENT
Start: 2021-06-24 | End: 2021-06-24 | Stop reason: HOSPADM

## 2021-06-24 RX ORDER — LORAZEPAM 2 MG/ML
0.5 INJECTION INTRAMUSCULAR ONCE
Status: COMPLETED | OUTPATIENT
Start: 2021-06-24 | End: 2021-06-24

## 2021-06-24 RX ORDER — POLYETHYLENE GLYCOL 3350 17 G/17G
1 POWDER, FOR SOLUTION ORAL DAILY
Qty: 510 G | Refills: 0 | Status: SHIPPED | OUTPATIENT
Start: 2021-06-24 | End: 2021-07-24

## 2021-06-24 RX ORDER — ONDANSETRON 2 MG/ML
4 INJECTION INTRAMUSCULAR; INTRAVENOUS EVERY 30 MIN PRN
Status: DISCONTINUED | OUTPATIENT
Start: 2021-06-24 | End: 2021-06-24 | Stop reason: HOSPADM

## 2021-06-24 RX ADMIN — ONDANSETRON 4 MG: 2 INJECTION INTRAMUSCULAR; INTRAVENOUS at 16:05

## 2021-06-24 RX ADMIN — LORAZEPAM 0.5 MG: 2 INJECTION INTRAMUSCULAR; INTRAVENOUS at 16:05

## 2021-06-24 RX ADMIN — SODIUM CHLORIDE 1000 ML: 9 INJECTION, SOLUTION INTRAVENOUS at 16:05

## 2021-06-24 RX ADMIN — SODIUM CHLORIDE: 9 INJECTION, SOLUTION INTRAVENOUS at 16:08

## 2021-06-24 ASSESSMENT — ENCOUNTER SYMPTOMS
VOMITING: 1
CONSTIPATION: 1
NAUSEA: 1
ABDOMINAL PAIN: 1

## 2021-06-24 NOTE — ED PROVIDER NOTES
History     Chief Complaint:  Vomiting       The history is provided by a parent and the patient. The history is limited by a developmental delay.      Ravi Dunne is a 30 year old male with history of autism, anxiety, and cyclic vomiting syndrome who presents for evaluation of abdominal pain and vomiting. Mother reports that for the last few days he has been experiencing these symptoms and nausea for the last few days. The family dog was diagnosed with giardia. Patient stool was tested and he was negative. Mother reports that the patient has been having difficulty with bowel movements and straining greatly to have one. She has concern that he has been in the ER quite often lately.     Review of Systems   Gastrointestinal: Positive for abdominal pain, constipation (large amount of straining ), nausea and vomiting.   All other systems reviewed and are negative.      Allergies:  Sulfa Drugs    Medications:  Ativan   Zofran   Atarax  Levothyroxine  Prilosec  Imitrex  Reglan  Lexapro  Trileptal  Seroquel    Past Medical History:    Anxiety   Autism  Autoimmune Hypothyroidism  Cyclic Vomiting Syndrome  Development Delay  Hyperlipidemia  Mood Disorder      Family History:    Father: Alcoholism, Throat cancer  Brother: Autism    Social History:  The patient was accompanied to the ED by mother.  Arrived by private vehicle.     Physical Exam     Patient Vitals for the past 24 hrs:   BP Temp Temp src Pulse Resp SpO2   06/24/21 1410 (!) 148/75 97.6  F (36.4  C) Temporal 80 18 98 %       Physical Exam  Vitals signs reviewed.   Constitutional:       Comments: Developmentally delayed   HENT:      Right Ear: Tympanic membrane normal.      Left Ear: Tympanic membrane normal.      Nose: Nose normal.   Eyes:      General: No scleral icterus.  Cardiovascular:      Rate and Rhythm: Normal rate and regular rhythm.      Pulses: Normal pulses.   Pulmonary:      Effort: Pulmonary effort is normal.   Abdominal:      General: Abdomen  is flat.      Palpations: Abdomen is soft.      Comments: Patient reports diffuse tenderness though no guarding or rebound   Musculoskeletal: Normal range of motion.   Skin:     General: Skin is warm.      Capillary Refill: Capillary refill takes less than 2 seconds.   Neurological:      Mental Status: He is alert. Mental status is at baseline.         Emergency Department Course     Imaging:  Abdomen XR, 2vw, flat and upright   IMPRESSION: No distended air-filled loops of small bowel. There is a   large amount of air throughout the small bowel and colon. There is a   small amount of stool in the colon. No intraperitoneal free air.   Report per radiology     Laboratory:    CBC: WBC 8.0, HGB 14.5,   CMP: WNL (Creatinine 0.99)    Lipase: 87    Emergency Department Course:    Reviewed:    1540 I reviewed the patient's nursing notes, vitals, past history and care everywhere    Assessments:    1542 I performed an exam of the patient as documented above.     1636 I rechecked the patient and explained findings.    1550 I rechecked the patient and explained findings.    Interventions:  1605 Ativan 0.5mg IV   1605 Zofran 4mg IV    Disposition:  The patient was discharged to home.     Impression & Plan          Medical Decision Making:  Ravi Dunne is a 30 year old male who presents to the emergency department today for evaluation of abdominal pain and vomiting.  Patient is relatively well-appearing without clinical signs of dehydration.  Lab work is unremarkable.  Patient was given IV fluids Ativan and Zofran with improvement.  Patient had 3 CT scans in the past 30 years old and of a normal for acute findings.  Do not feel his exam is requiring more there is no focal tenderness of the abdomen plain films suggest gaseous distention of the colon and small bowel likely constipation or food related.  Patient was offered simethicone MiraLAX and follow-up with GI due to his history of cyclical vomiting and ongoing  family complaints of abdominal pain the patient is well-appearing and discharged in stable condition      Diagnosis:    ICD-10-CM    1. Non-intractable vomiting without nausea, unspecified vomiting type  R11.11        Discharge Medications:  New Prescriptions    POLYETHYLENE GLYCOL (MIRALAX) 17 GM/DOSE POWDER    Take 17 g (1 capful) by mouth daily    SIMETHICONE (MYLICON) 125 MG CHEWABLE TABLET    Take 1 tablet (125 mg) by mouth 2 times daily       Scribe Disclosure:  IAdwoa, am serving as a scribe at 3:42 PM on 6/24/2021 to document services personally performed by Derick Sepulveda MD based on my observations and the provider's statements to me.     [facility] EMERGENCY DEPT         Derick Sepulveda MD  06/25/21 0024

## 2021-06-24 NOTE — DISCHARGE INSTRUCTIONS
We have done lab work and x-rays there is no major signs of dehydration.  There is a moderate amount of gas and stool in the colon.  We are recommending follow-up with gastroenterology to discuss further work-ups if needed for etiologies of abdominal pain other than CAT scans x-rays and blood work.  There is no clinical concern for appendicitis bowel obstruction or other major intra-abdominal pathology.  Please trial simethicone for gas.  Continue to use MiraLAX daily for constipation and slow bowels.  Return with persistent vomiting unable to tolerate fluids fever greater than 100.4 or severe intolerable abdominal pain.

## 2021-06-24 NOTE — ED TRIAGE NOTES
History obtained by patient mother: Patient is complaining of nausea and has a history of cyclic vomiting.

## 2021-06-24 NOTE — TELEPHONE ENCOUNTER
Mom advised, stated understanding, and agreed to plan of care.  Pt not feeling well, says he wants to go to the ER to get treated and feel better.   rec'ed they schedule appt with GI to be seen sooner than Sept.

## 2021-06-24 NOTE — TELEPHONE ENCOUNTER
Stool test was negative for Giardia.  We can check for other parasitic infections, the need to submit stool samples for each of these test.  Future orders placed, come to the lab to collect the specimen collection materials, return to the lab as instructed.  I will instruct about the results.  A scan would not be helpful at this point, the Abdominal CT scan from 1 year ago was completely normal.  His lab tests including blood counts and liver tests would be abnormal by now if there is some sort of cancer present.    If he still having any regular loose stools (more than 3 times per day), take over-the-counter Imodium as needed according to the instructions on the box.  Be careful about producing constipation.    Return to see me in the clinic in a month, sooner if need be.    Follow-up with gastroenterology as planned.

## 2021-06-24 NOTE — TELEPHONE ENCOUNTER
Pt mother calling and Giardia result negative . Pt has been seeing GI - next in Sept . Pt is losing weight and was dx with cyclic vomiting at 3 yr old but it  Is getting worse and now pt is having abdominal pian and wants to go back to the ER . Mother requesting if we can test for other parasites or do a scan? She is becoming to became worried about cancer?Jagruti Link RN

## 2021-06-25 ENCOUNTER — PATIENT OUTREACH (OUTPATIENT)
Dept: CARE COORDINATION | Facility: CLINIC | Age: 31
End: 2021-06-25

## 2021-06-25 DIAGNOSIS — Z71.89 OTHER SPECIFIED COUNSELING: ICD-10-CM

## 2021-06-25 NOTE — PROGRESS NOTES
Clinic Care Coordination Contact  [facility]/Voicemail       Clinical Data: Care Coordinator Outreach  Outreach attempted x 1.  Left message on patient's voicemail with call back information and requested return call.  Plan:. Care Coordinator will try to reach patient again in 1-2 business days.

## 2021-06-25 NOTE — LETTER
[facility] CARE COORDINATION  [address] [facility] [address]    June 28, 2021    Ravi Dunne  [address] [place] [facility] [address]      Dear Ravi,    I am a clinic community health worker who works with Bhaskar Gandara MD at [facility]. I have been trying to reach you recently to introduce Clinic Care Coordination and to see if there was anything I could assist you with.  Below is a description of clinic care coordination and how I can further assist you.      The clinic care coordination team is made up of a registered nurse,  and community health worker who understand the health care system. The goal of clinic care coordination is to help you manage your health and improve access to the health care system in the most efficient manner. The team can assist you in meeting your health care goals by providing education, coordinating services, strengthening the communication among your providers and supporting you with any resource needs.    Please feel free to contact the Community Health Worker at [phone] with any questions or concerns. We are focused on providing you with the highest-quality healthcare experience possible and that all starts with you.     Sincerely,     Kristie Gandara   Community Health Worker   Ph: [phone]  Fx: [phone]

## 2021-06-28 NOTE — PROGRESS NOTES
Clinic Care Coordination Contact  [facility]/Voicemail       Clinical Data: Care Coordinator Outreach  Outreach attempted x 2.  Left message on patient's voicemail with call back information and requested return call.  Plan: Care Coordinator will send care coordination introduction letter with care coordinator contact information and explanation of care coordination services via [place]. Care Coordinator will do no further outreaches at this time.

## 2021-07-05 DIAGNOSIS — E06.3 AUTOIMMUNE HYPOTHYROIDISM: ICD-10-CM

## 2021-07-05 DIAGNOSIS — R11.15 INTRACTABLE CYCLICAL VOMITING WITH NAUSEA: ICD-10-CM

## 2021-07-05 RX ORDER — LEVOTHYROXINE SODIUM 125 UG/1
TABLET ORAL
Qty: 90 TABLET | Refills: 0 | Status: SHIPPED | OUTPATIENT
Start: 2021-07-05 | End: 2021-10-11

## 2021-07-12 ENCOUNTER — TRANSFERRED RECORDS (OUTPATIENT)
Dept: HEALTH INFORMATION MANAGEMENT | Facility: CLINIC | Age: 31
End: 2021-07-12

## 2021-08-31 ENCOUNTER — E-VISIT (OUTPATIENT)
Dept: INTERNAL MEDICINE | Facility: CLINIC | Age: 31
End: 2021-08-31
Payer: MEDICAID

## 2021-08-31 DIAGNOSIS — Z20.822 CLOSE EXPOSURE TO 2019 NOVEL CORONAVIRUS: Primary | ICD-10-CM

## 2021-08-31 PROCEDURE — 99421 OL DIG E/M SVC 5-10 MIN: CPT | Performed by: INTERNAL MEDICINE

## 2021-09-01 ENCOUNTER — NURSE TRIAGE (OUTPATIENT)
Dept: NURSING | Facility: CLINIC | Age: 31
End: 2021-09-01

## 2021-09-01 ENCOUNTER — LAB (OUTPATIENT)
Dept: URGENT CARE | Facility: URGENT CARE | Age: 31
End: 2021-09-01
Attending: INTERNAL MEDICINE
Payer: MEDICAID

## 2021-09-01 DIAGNOSIS — Z20.822 CLOSE EXPOSURE TO 2019 NOVEL CORONAVIRUS: ICD-10-CM

## 2021-09-01 PROCEDURE — U0003 INFECTIOUS AGENT DETECTION BY NUCLEIC ACID (DNA OR RNA); SEVERE ACUTE RESPIRATORY SYNDROME CORONAVIRUS 2 (SARS-COV-2) (CORONAVIRUS DISEASE [COVID-19]), AMPLIFIED PROBE TECHNIQUE, MAKING USE OF HIGH THROUGHPUT TECHNOLOGIES AS DESCRIBED BY CMS-2020-01-R: HCPCS

## 2021-09-01 PROCEDURE — U0005 INFEC AGEN DETEC AMPLI PROBE: HCPCS

## 2021-09-01 NOTE — PATIENT INSTRUCTIONS
"  Dear Ravi Dunne,    Based on your exposure to COVID-19 (coronavirus), we would like to test you for this virus. I have placed an order for this test.The best time for testing is 5-7 days after the exposure.    How to schedule:  Go to your [facility] home page and scroll down to the section that says  You have an appointment that needs to be scheduled  and click the large green button that says  Schedule Now  and follow the steps to find the next available opening.     If you are unable to complete these [facility] scheduling steps, please call [phone] to schedule your testing.     Return to work/school/ guidance:   For people with high risk exposures outside the home    Please let your workplace manager and staffing office know when your quarantine ends.     We can not give you an exact date as it depends on the information below. You can calculate this on your own or work with your manager/staffing office to calculate this. (For example if you were exposed on 10/4, you would have to quarantine for 14 full days. That would be through 10/18. You could return on 10/19.)    Quarantine Guidelines:  Patients (\"contacts\") who have been in close prolonged contact of an infected person(s) (within six feet for at least 15 minutes within a 24 hour period), and remain asymptomatic should enter quarantine based on the following options:    14-day quarantine period (this remains the CDC recommendation for the greatest protection against spread of COVID-19) OR    Minimum 7-day quarantine with negative RT-PCR test collected on day 5 or later OR    10-day quarantine with no test  Quarantine Guideline exceptions are as follows:    People who have been fully vaccinated do not need to quarantine if the exposure was at least 2 weeks after the last vaccination. This includes vaccinated health care workers.    Not fully vaccinated and unvaccinated Individuals who work in health care, congregate care, or congregate living " should be off work for 14 days from their last date of exposure. Community activities for this group can be resumed based on options above. Fully vaccinated individuals in this group do not need to quarantine from work after exposure.    Not fully vaccinated and unvaccinated people whose high-risk exposure was a household member should always quarantine for 14 days from their last date of exposure. Fully vaccinated people in this category do not need to quarantine.    Not fully vaccinated or unvaccinated residents of congregate care and congregate living settings should always quarantine for 14 days from their last date of exposure. Fully vaccinated residents do not need to quarantine.  Note: If you have ongoing exposure to the covid positive person, this quarantine period may be more than 14 days. (For example, if you are continued to be exposed to your child who tested positive and cannot isolate from them, then the quarantine of 7-14 days can't start until your child is no longer contagious. This is typically 10 days from onset of the child's symptoms. So the total duration may be 17-24 days in this case.)    You should continue symptom monitoring until day 14 post-exposure. If you develop signs or symptoms of COVID-19, isolate and get tested (even if you have been tested already).    How to quarantine:   Stay home and away from others. Don't go to school or anywhere else. Generally quarantine means staying home from work but there are some exceptions to this. Please contact your workplace.  No hugging, kissing or shaking hands.  Don't let anyone visit.  Cover your mouth and nose with a mask, tissue or washcloth to avoid spreading germs.  Wash your hands and face often. Use soap and water.    What are the symptoms of COVID-19?  The most common symptoms are cough, fever and trouble breathing. Less common symptoms include headache, body aches, fatigue (feeling very tired), chills, sore throat, stuffy or runny nose,  diarrhea (loose poop), loss of taste or smell, belly pain, and nausea or vomiting (feeling sick to your stomach or throwing up).  After 14 days, if you have still don't have symptoms, you likely don't have this virus.  If you develop symptoms, follow these guidelines.  If you're normally healthy: Please start another eVisit.  If you have a serious health problem (like cancer, heart failure, an organ transplant or kidney disease): Call your specialty clinic. Let them know that you might have COVID-19.    Where can I get more information?  [facility] - About COVID-19: [email or web address]  CDC - What to Do If You're Sick: www.cdc.gov/coronavirus/2019-ncov/about/steps-when-sick.html  CDC - Ending Home Isolation: www.cdc.gov/coronavirus/2019-ncov/hcp/disposition-in-home-patients.html  CDC - Caring for Someone: www.cdc.gov/coronavirus/2019-ncov/if-you-are-sick/care-for-someone.html  [facility] clinical trials (COVID-19 research studies): clinicalaffairs.[facility]-clinical-trials  Below are the COVID-19 hotlines at the [place] [facility] ([facility]). Interpreters are available.  For health questions: Call 643-728-3392 or 1-389.239.1408 (7 a.m. to 7 p.m.)  For questions about schools and childcare: Call 249-481-4556 or 1-727.796.7734 (7 a.m. to 7 p.m.)

## 2021-09-01 NOTE — TELEPHONE ENCOUNTER
"Triage Call:     Mother, Sol is calling, CTC is not filled out appropriately.  Sol is patient's legal guardian.  Please see 2021 document that proves that Sol is patient's legal guardian. Per supervisor, Fany, if there is documentation in chart that Sol is patient's legal guardian, it is okay to disclose patient's health information.  She is calling about e-visit that was submitted for patient to get Covid test that was ordered. Covid test was already ordered as \"future\" in patient's chart. Gave information about how to schedule Covid test, including Covid scheduling number. RN advised if Sol has any other questions or concerns to call back.  Sol plans on calling Covid scheduling to get son scheduled.     Thu Bingham RN on 9/1/2021 at 11:03 AM      Reason for Disposition    General information question, no triage required and triager able to answer question    Health Information question, no triage required and triager able to answer question    Protocols used: INFORMATION ONLY CALL - NO TRIAGE-A-    COVID 19 Nurse Triage Plan/Patient Instructions    Please be aware that novel coronavirus (COVID-19) may be circulating in the community. If you develop symptoms such as fever, cough, or SOB or if you have concerns about the presence of another infection including coronavirus (COVID-19), please contact your health care provider or visit [email or web address].     Disposition/Instructions    Additional COVID19 information to add for patients.   How can I protect others?  If you have symptoms (fever, cough, body aches or trouble breathing): Stay home and away from others (self-isolate) until:    At least 10 days have passed since your symptoms started, And     You ve had no fever--and no medicine that reduces fever--for 1 full day (24 hours), And      Your other symptoms have resolved (gotten better).     If you don t have symptoms, but a test showed that you have COVID-19 (you tested " "positive):    Stay home and away from others (self-isolate). Follow the tips under \"How do I self-isolate?\" below for 10 days (20 days if you have a weak immune system).    You don't need to be retested for COVID-19 before going back to school or work. As long as you're fever-free and feeling better, you can go back to school, work and other activities after waiting the 10 or 20 days.     How do I self-isolate?    Stay in your own room, even for meals. Use your own bathroom if you can.     Stay away from others in your home. No hugging, kissing or shaking hands. No visitors.    Don t go to work, school or anywhere else.     Clean  high touch  surfaces often (doorknobs, counters, handles, etc.). Use a household cleaning spray or wipes. You ll find a full list on the EPA website:  www.epa.gov/pesticide-registration/list-n-disinfectants-use-against-sars-cov-2.    Cover your mouth and nose with a mask, tissue or washcloth to avoid spreading germs.    Wash your hands and face often. Use soap and water.    Caregivers in these groups are at risk for severe illness due to COVID-19:  o People 65 years and older  o People who live in a nursing home or long-term care facility  o People with chronic disease (lung, heart, cancer, diabetes, kidney, liver, immunologic)  o People who have a weakened immune system, including those who:  - Are in cancer treatment  - Take medicine that weakens the immune system, such as corticosteroids  - Had a bone marrow or organ transplant  - Have an immune deficiency  - Have poorly controlled HIV or AIDS  - Are obese (body mass index of 40 or higher)  - Smoke regularly    Caregivers should wear gloves while washing dishes, handling laundry and cleaning bedrooms and bathrooms.    Use caution when washing and drying laundry: Don t shake dirty laundry, and use the warmest water setting that you can.    For more tips, go to www.cdc.gov/coronavirus/2019-ncov/downloads/10Things.pdf.    How can I take " care of myself?  1. Get lots of rest. Drink extra fluids (unless a doctor has told you not to).     2. Take Tylenol (acetaminophen) for fever or pain. If you have liver or kidney problems, ask your family doctor if it s okay to take Tylenol.     Adults can take either:     650 mg (two 325 mg pills) every 4 to 6 hours, or     1,000 mg (two 500 mg pills) every 8 hours as needed.     Note: Don t take more than 3,000 mg in one day.   Acetaminophen is found in many medicines (both prescribed and over-the-counter medicines). Read all labels to be sure you don t take too much.     For children, check the Tylenol bottle for the right dose. The dose is based on the child s age or weight.    3. If you have other health problems (like cancer, heart failure, an organ transplant or severe kidney disease): Call your specialty clinic if you don t feel better in the next 2 days.    4. Know when to call 911: Emergency warning signs include:    Trouble breathing or shortness of breath    Pain or pressure in the chest that doesn t go away    Feeling confused like you haven t felt before, or not being able to wake up    Bluish-colored lips or face    What are the symptoms of COVID-19?     The most common symptoms are cough, fever and trouble breathing.     Less common symptoms include body aches, chills, diarrhea (loose, watery poops), fatigue (feeling very tired), headache, runny nose, sore throat and loss of smell.    COVID-19 can cause severe coughing (bronchitis) and lung infection (pneumonia).    How does it spread?     The virus may spread when a person coughs or sneezes into the air. The virus can travel about 6 feet this way, and it can live on surfaces.      Common  (household disinfectants) will kill the virus.    Who is at risk?  Anyone can catch COVID-19 if they re around someone who has the virus.    How can others protect themselves?     Stay away from people who have COVID-19 (or symptoms of COVID-19).    Wash  hands often with soap and water. Or, use hand  with at least 60% alcohol.    Avoid touching the eyes, nose or mouth.     Wear a face mask when you go out in public, when sick or when caring for a sick person.    Where can I get more information?    [facility]: About COVID-19: [email or web address]    CDC: What to Do If You re Sick: www.cdc.gov/coronavirus/2019-ncov/about/steps-when-sick.html    CDC: Ending Home Isolation: www.cdc.gov/coronavirus/2019-ncov/hcp/disposition-in-home-patients.html     CDC: Caring for Someone: www.cdc.gov/coronavirus/2019-ncov/if-you-are-sick/care-for-someone.html     [facility]: Interim Guidance for Hospital Discharge to Home: www.health.[facility].mn./diseases/coronavirus/hcp/hospdischarge.pdf    [facility] clinical trials (COVID-19 research studies): clinicalaffairs.[facility]-clinical-trials     Below are the COVID-19 hotlines at the [place] Department of Health ([facility]). Interpreters are available.   o For health questions: Call 897-065-4962 or 1-848.923.8128 (7 a.m. to 7 p.m.)  o For questions about schools and childcare: Call 461-685-6864 or 1-465.164.7825 (7 a.m. to 7 p.m.)          Thank you for taking steps to prevent the spread of this virus.  o Limit your contact with others.  o Wear a simple mask to cover your cough.  o Wash your hands well and often.    Resources    [facility]: About COVID-19: [email or web address]    CDC: What to Do If You're Sick: www.cdc.gov/coronavirus/2019-ncov/about/steps-when-sick.html    CDC: Ending Home Isolation: www.cdc.gov/coronavirus/2019-ncov/hcp/disposition-in-home-patients.html     CDC: Caring for Someone: www.cdc.gov/coronavirus/2019-ncov/if-you-are-sick/care-for-someone.html     [facility]: Interim Guidance for Hospital Discharge to Home: www.health.[facility].mn.us/diseases/coronavirus/hcp/hospdischarge.pdf    [facility] clinical trials (COVID-19 research studies):  clinicalaffairs.[facility]-clinical-trials     Below are the COVID-19 hotlines at the [place] Department of [facility] ([facility]). Interpreters are available.   o For health questions: Call 232-952-9009 or 1-614.200.1216 (7 a.m. to 7 p.m.)  o For questions about schools and childcare: Call 634-680-6365 or 1-553.333.6056 (7 a.m. to 7 p.m.)

## 2021-09-02 LAB — SARS-COV-2 RNA RESP QL NAA+PROBE: NEGATIVE

## 2021-09-07 ENCOUNTER — MEDICAL CORRESPONDENCE (OUTPATIENT)
Dept: HEALTH INFORMATION MANAGEMENT | Facility: CLINIC | Age: 31
End: 2021-09-07

## 2021-09-07 DIAGNOSIS — Z79.899 NEED FOR PROPHYLACTIC CHEMOTHERAPY: Primary | ICD-10-CM

## 2021-09-09 DIAGNOSIS — Z79.899 HIGH RISK MEDICATIONS (NOT ANTICOAGULANTS) LONG-TERM USE: Primary | ICD-10-CM

## 2021-09-22 ENCOUNTER — IMMUNIZATION (OUTPATIENT)
Dept: NURSING | Facility: CLINIC | Age: 31
End: 2021-09-22
Payer: MEDICAID

## 2021-09-22 PROCEDURE — 0001A PR COVID VAC PFIZER DIL RECON 30 MCG/0.3 ML IM: CPT

## 2021-09-22 PROCEDURE — 91300 PR COVID VAC PFIZER DIL RECON 30 MCG/0.3 ML IM: CPT

## 2021-10-09 DIAGNOSIS — E06.3 AUTOIMMUNE HYPOTHYROIDISM: ICD-10-CM

## 2021-10-11 RX ORDER — LEVOTHYROXINE SODIUM 125 UG/1
TABLET ORAL
Qty: 90 TABLET | Refills: 0 | Status: SHIPPED | OUTPATIENT
Start: 2021-10-11 | End: 2022-01-17

## 2021-10-13 ENCOUNTER — IMMUNIZATION (OUTPATIENT)
Dept: NURSING | Facility: CLINIC | Age: 31
End: 2021-10-13
Attending: INTERNAL MEDICINE
Payer: MEDICAID

## 2021-10-13 PROCEDURE — 91300 PR COVID VAC PFIZER DIL RECON 30 MCG/0.3 ML IM: CPT

## 2021-10-13 PROCEDURE — 0002A PR COVID VAC PFIZER DIL RECON 30 MCG/0.3 ML IM: CPT

## 2021-10-24 ENCOUNTER — HEALTH MAINTENANCE LETTER (OUTPATIENT)
Age: 31
End: 2021-10-24

## 2021-11-05 ENCOUNTER — HOSPITAL ENCOUNTER (EMERGENCY)
Facility: CLINIC | Age: 31
Discharge: HOME | End: 2021-11-05
Attending: EMERGENCY MEDICINE | Admitting: EMERGENCY MEDICINE
Payer: MEDICAID

## 2021-11-05 VITALS
TEMPERATURE: 97.5 F | HEART RATE: 79 BPM | RESPIRATION RATE: 18 BRPM | OXYGEN SATURATION: 98 % | DIASTOLIC BLOOD PRESSURE: 75 MMHG | SYSTOLIC BLOOD PRESSURE: 115 MMHG | BODY MASS INDEX: 24.83 KG/M2 | WEIGHT: 204 LBS

## 2021-11-05 DIAGNOSIS — R11.2 NAUSEA AND VOMITING, INTRACTABILITY OF VOMITING NOT SPECIFIED, UNSPECIFIED VOMITING TYPE: ICD-10-CM

## 2021-11-05 LAB
ANION GAP SERPL CALCULATED.3IONS-SCNC: 5 MMOL/L (ref 3–14)
BASOPHILS # BLD AUTO: 0 10E3/UL (ref 0–0.2)
BASOPHILS NFR BLD AUTO: 0 %
BUN SERPL-MCNC: 16 MG/DL (ref 7–30)
CALCIUM SERPL-MCNC: 9.8 MG/DL (ref 8.5–10.1)
CHLORIDE BLD-SCNC: 105 MMOL/L (ref 94–109)
CO2 SERPL-SCNC: 27 MMOL/L (ref 20–32)
CREAT SERPL-MCNC: 1.07 MG/DL (ref 0.66–1.25)
EOSINOPHIL # BLD AUTO: 0 10E3/UL (ref 0–0.7)
EOSINOPHIL NFR BLD AUTO: 0 %
ERYTHROCYTE [DISTWIDTH] IN BLOOD BY AUTOMATED COUNT: 12.1 % (ref 10–15)
GFR SERPL CREATININE-BSD FRML MDRD: >90 ML/MIN/1.73M2
GLUCOSE BLD-MCNC: 108 MG/DL (ref 70–99)
HCT VFR BLD AUTO: 48.2 % (ref 40–53)
HGB BLD-MCNC: 16.6 G/DL (ref 13.3–17.7)
HOLD SPECIMEN: NORMAL
IMM GRANULOCYTES # BLD: 0.1 10E3/UL
IMM GRANULOCYTES NFR BLD: 0 %
LYMPHOCYTES # BLD AUTO: 1.8 10E3/UL (ref 0.8–5.3)
LYMPHOCYTES NFR BLD AUTO: 14 %
MCH RBC QN AUTO: 29 PG (ref 26.5–33)
MCHC RBC AUTO-ENTMCNC: 34.4 G/DL (ref 31.5–36.5)
MCV RBC AUTO: 84 FL (ref 78–100)
MONOCYTES # BLD AUTO: 0.5 10E3/UL (ref 0–1.3)
MONOCYTES NFR BLD AUTO: 4 %
NEUTROPHILS # BLD AUTO: 10.8 10E3/UL (ref 1.6–8.3)
NEUTROPHILS NFR BLD AUTO: 82 %
NRBC # BLD AUTO: 0 10E3/UL
NRBC BLD AUTO-RTO: 0 /100
PLATELET # BLD AUTO: 244 10E3/UL (ref 150–450)
POTASSIUM BLD-SCNC: 3.8 MMOL/L (ref 3.4–5.3)
RBC # BLD AUTO: 5.73 10E6/UL (ref 4.4–5.9)
SODIUM SERPL-SCNC: 137 MMOL/L (ref 133–144)
WBC # BLD AUTO: 13.3 10E3/UL (ref 4–11)

## 2021-11-05 PROCEDURE — 96374 THER/PROPH/DIAG INJ IV PUSH: CPT

## 2021-11-05 PROCEDURE — 99285 EMERGENCY DEPT VISIT HI MDM: CPT | Mod: 25

## 2021-11-05 PROCEDURE — 36415 COLL VENOUS BLD VENIPUNCTURE: CPT | Performed by: EMERGENCY MEDICINE

## 2021-11-05 PROCEDURE — 250N000011 HC RX IP 250 OP 636: Performed by: EMERGENCY MEDICINE

## 2021-11-05 PROCEDURE — 85025 COMPLETE CBC W/AUTO DIFF WBC: CPT | Performed by: EMERGENCY MEDICINE

## 2021-11-05 PROCEDURE — 80048 BASIC METABOLIC PNL TOTAL CA: CPT | Performed by: EMERGENCY MEDICINE

## 2021-11-05 PROCEDURE — 96376 TX/PRO/DX INJ SAME DRUG ADON: CPT

## 2021-11-05 PROCEDURE — 258N000003 HC RX IP 258 OP 636: Performed by: EMERGENCY MEDICINE

## 2021-11-05 PROCEDURE — 96361 HYDRATE IV INFUSION ADD-ON: CPT

## 2021-11-05 PROCEDURE — 96375 TX/PRO/DX INJ NEW DRUG ADDON: CPT

## 2021-11-05 RX ORDER — ONDANSETRON 2 MG/ML
4 INJECTION INTRAMUSCULAR; INTRAVENOUS ONCE
Status: COMPLETED | OUTPATIENT
Start: 2021-11-05 | End: 2021-11-05

## 2021-11-05 RX ORDER — ONDANSETRON 4 MG/1
4 TABLET, ORALLY DISINTEGRATING ORAL EVERY 8 HOURS PRN
Qty: 10 TABLET | Refills: 0 | Status: SHIPPED | OUTPATIENT
Start: 2021-11-05 | End: 2021-11-08

## 2021-11-05 RX ORDER — SODIUM CHLORIDE 9 MG/ML
INJECTION, SOLUTION INTRAVENOUS CONTINUOUS
Status: DISCONTINUED | OUTPATIENT
Start: 2021-11-05 | End: 2021-11-05 | Stop reason: HOSPADM

## 2021-11-05 RX ORDER — LORAZEPAM 2 MG/ML
0.5 INJECTION INTRAMUSCULAR ONCE
Status: COMPLETED | OUTPATIENT
Start: 2021-11-05 | End: 2021-11-05

## 2021-11-05 RX ADMIN — SODIUM CHLORIDE 1000 ML: 9 INJECTION, SOLUTION INTRAVENOUS at 10:54

## 2021-11-05 RX ADMIN — ONDANSETRON 4 MG: 2 INJECTION INTRAMUSCULAR; INTRAVENOUS at 10:55

## 2021-11-05 RX ADMIN — ONDANSETRON 4 MG: 2 INJECTION INTRAMUSCULAR; INTRAVENOUS at 11:54

## 2021-11-05 RX ADMIN — SODIUM CHLORIDE 1000 ML: 9 INJECTION, SOLUTION INTRAVENOUS at 11:51

## 2021-11-05 RX ADMIN — LORAZEPAM 0.5 MG: 2 INJECTION INTRAMUSCULAR; INTRAVENOUS at 12:25

## 2021-11-05 RX ADMIN — LORAZEPAM 0.5 MG: 2 INJECTION INTRAMUSCULAR; INTRAVENOUS at 10:56

## 2021-11-05 RX ADMIN — SODIUM CHLORIDE 1000 ML: 9 INJECTION, SOLUTION INTRAVENOUS at 11:54

## 2021-11-05 ASSESSMENT — ENCOUNTER SYMPTOMS
NAUSEA: 1
NERVOUS/ANXIOUS: 1
VOMITING: 1

## 2021-11-05 NOTE — ED PROVIDER NOTES
History   Chief Complaint:  Nausea & Vomiting       The history is provided by a parent.      Ravi Dunne is a 31 year old male with history of cyclic vomiting syndrome, autism, autoimmune hypothyroidism, and anxiety who presents with nausea and vomiting. The patient's mother states that the patient became sick yesterday evening with nausea and vomiting. She states that he skipped dinner and his medications last night, and woke up with symptoms unchanged. She also notes that he has hemorrhoids. The patient has a history of cyclic vomiting, and the mother states that his presentation is somewhat similar to previous episodes, although he is typically not tearful as he is today. She states that he usually receives IV fluids and anxiety medication in the ED.    Review of Systems   Gastrointestinal: Positive for nausea and vomiting.   Psychiatric/Behavioral: The patient is nervous/anxious.    All other systems reviewed and are negative.    Allergies:  Sulfa Drugs    Medications:  Zyrtec  Lexapro  Atarax  Synthroid  Ativan  Reglan  Prilosec  Zofran  Trileptal  Seroquel  Mylicon    Past Medical History:     Anxiety  Autism  Autoimmune hypothyroidism  Cyclic vomiting syndrome  Developmental delay  Hyperlipidemia  Mood disorder    Family History:    Father: alcoholism, cancer  Brother: autism    Social History:  Patient presents with his mother    Physical Exam     Patient Vitals for the past 24 hrs:   BP Temp Pulse Resp SpO2 Weight   11/05/21 1230 115/75 -- 79 18 98 % --   11/05/21 1200 132/78 -- 76 18 96 % --   11/05/21 0947 (!) 144/76 97.5  F (36.4  C) 85 20 97 % 92.5 kg (204 lb)       Physical Exam  Eyes:  The pupils are equal and round    Conjunctivae and sclerae are normal  ENT:    The nose is normal    Pinnae are normal  CV:  Regular rate and rhythm     No edema  Resp:  Lungs are clear    Non-labored    No rales    No wheezing   GI:  Abdomen is soft and non-tender, there is no rigidity    No distension    No  rebound tenderness   MS:  Normal muscular tone    No asymmetric leg swelling  Skin:  No rash or acute skin lesions noted  Neuro:   Awake, alert.      Speech is normal and fluent.    Face is symmetric.     Moves all extremities    Emergency Department Course     Laboratory:  Labs Ordered and Resulted from Time of ED Arrival to Time of ED Departure   BASIC METABOLIC PANEL - Abnormal       Result Value    Sodium 137      Potassium 3.8      Chloride 105      Carbon Dioxide (CO2) 27      Anion Gap 5      Urea Nitrogen 16      Creatinine 1.07      Calcium 9.8      Glucose 108 (*)     GFR Estimate >90     CBC WITH PLATELETS AND DIFFERENTIAL - Abnormal    WBC Count 13.3 (*)     RBC Count 5.73      Hemoglobin 16.6      Hematocrit 48.2      MCV 84      MCH 29.0      MCHC 34.4      RDW 12.1      Platelet Count 244      % Neutrophils 82      % Lymphocytes 14      % Monocytes 4      % Eosinophils 0      % Basophils 0      % Immature Granulocytes 0      NRBCs per 100 WBC 0      Absolute Neutrophils 10.8 (*)     Absolute Lymphocytes 1.8      Absolute Monocytes 0.5      Absolute Eosinophils 0.0      Absolute Basophils 0.0      Absolute Immature Granulocytes 0.1 (*)     Absolute NRBCs 0.0            Emergency Department Course:  Reviewed:  I reviewed nursing notes, vitals, past medical history and Care Everywhere    Assessments:  1019 I obtained history and examined the patient as noted above.   1300 I rechecked the patient and explained findings.     Interventions:  1054 NS 1 L IV  1055 Zofran 4 mg IV  1056 Ativan 0.5 mg IV  1154 NS 1 L IV  1154 Zofran 4 mg IV  1255 Ativan 0.5 mg IV    Disposition:  The patient was discharged to home.     Impression & Plan     Medical Decision Making:  Ravi Dunne is a 31 year old male presents to the emergency department with vomiting. He has also had diarrhea. He has a history of cyclic vomiting syndrome and his caregiver reports that this is similar, although he is more tearful today than  typical. She notes that anxiety medication s and fluids tend to help his symptoms. He has a benign abdomen on exam and was palpated several times through ED stay. Lab work up shows a slight leukocytosis. He was given IV fluids, Zofran x2, and 2 doses of Ativan while here. He was able to eat crackers and clinically appeared improved. No focal areas of tenderness on abdominal exam. Felt that he was appropriate for outpatient management. He was given a refill for his Zofran medications used at home. He was encouraged to return with any new or worrisome symptoms.    Diagnosis:    ICD-10-CM    1. Nausea and vomiting, intractability of vomiting not specified, unspecified vomiting type  R11.2        Discharge Medications:  Discharge Medication List as of 11/5/2021  1:21 PM      START taking these medications    Details   !! ondansetron (ZOFRAN ODT) 4 MG ODT tab Take 1 tablet (4 mg) by mouth every 8 hours as needed for nausea or vomiting, Disp-10 tablet, R-0, Local Print       !! - Potential duplicate medications found. Please discuss with provider.          Scribe Disclosure:  I, Sharee Park, am serving as a scribe at 10:19 AM on 11/5/2021 to document services personally performed by Ernie Richard MD based on my observations and the provider's statements to me.            Ernie Richard MD  11/05/21 2637

## 2021-11-06 ENCOUNTER — HOSPITAL ENCOUNTER (EMERGENCY)
Facility: CLINIC | Age: 31
Discharge: HOME | End: 2021-11-06
Attending: EMERGENCY MEDICINE | Admitting: EMERGENCY MEDICINE
Payer: MEDICAID

## 2021-11-06 ENCOUNTER — TRANSFERRED RECORDS (OUTPATIENT)
Dept: HEALTH INFORMATION MANAGEMENT | Facility: CLINIC | Age: 31
End: 2021-11-06

## 2021-11-06 VITALS
WEIGHT: 204 LBS | HEIGHT: 76 IN | HEART RATE: 76 BPM | RESPIRATION RATE: 18 BRPM | SYSTOLIC BLOOD PRESSURE: 148 MMHG | OXYGEN SATURATION: 97 % | TEMPERATURE: 97 F | BODY MASS INDEX: 24.84 KG/M2 | DIASTOLIC BLOOD PRESSURE: 93 MMHG

## 2021-11-06 DIAGNOSIS — R11.15 CYCLICAL VOMITING: ICD-10-CM

## 2021-11-06 LAB
ALBUMIN SERPL-MCNC: 4.4 G/DL (ref 3.4–5)
ALP SERPL-CCNC: 79 U/L (ref 40–150)
ALT SERPL W P-5'-P-CCNC: 42 U/L (ref 0–70)
ANION GAP SERPL CALCULATED.3IONS-SCNC: 6 MMOL/L (ref 3–14)
AST SERPL W P-5'-P-CCNC: 17 U/L (ref 0–45)
BASOPHILS # BLD AUTO: 0 10E3/UL (ref 0–0.2)
BASOPHILS NFR BLD AUTO: 0 %
BILIRUB SERPL-MCNC: 0.8 MG/DL (ref 0.2–1.3)
BUN SERPL-MCNC: 16 MG/DL (ref 7–30)
CALCIUM SERPL-MCNC: 9 MG/DL (ref 8.5–10.1)
CHLORIDE BLD-SCNC: 107 MMOL/L (ref 94–109)
CO2 SERPL-SCNC: 25 MMOL/L (ref 20–32)
CREAT SERPL-MCNC: 0.92 MG/DL (ref 0.66–1.25)
EOSINOPHIL # BLD AUTO: 0 10E3/UL (ref 0–0.7)
EOSINOPHIL NFR BLD AUTO: 0 %
ERYTHROCYTE [DISTWIDTH] IN BLOOD BY AUTOMATED COUNT: 11.9 % (ref 10–15)
GFR SERPL CREATININE-BSD FRML MDRD: >90 ML/MIN/1.73M2
GLUCOSE BLD-MCNC: 95 MG/DL (ref 70–99)
HCT VFR BLD AUTO: 43.5 % (ref 40–53)
HGB BLD-MCNC: 14.8 G/DL (ref 13.3–17.7)
IMM GRANULOCYTES # BLD: 0 10E3/UL
IMM GRANULOCYTES NFR BLD: 0 %
LYMPHOCYTES # BLD AUTO: 1.5 10E3/UL (ref 0.8–5.3)
LYMPHOCYTES NFR BLD AUTO: 16 %
MCH RBC QN AUTO: 29 PG (ref 26.5–33)
MCHC RBC AUTO-ENTMCNC: 34 G/DL (ref 31.5–36.5)
MCV RBC AUTO: 85 FL (ref 78–100)
MONOCYTES # BLD AUTO: 0.4 10E3/UL (ref 0–1.3)
MONOCYTES NFR BLD AUTO: 5 %
NEUTROPHILS # BLD AUTO: 7.4 10E3/UL (ref 1.6–8.3)
NEUTROPHILS NFR BLD AUTO: 79 %
NRBC # BLD AUTO: 0 10E3/UL
NRBC BLD AUTO-RTO: 0 /100
PLATELET # BLD AUTO: 199 10E3/UL (ref 150–450)
POTASSIUM BLD-SCNC: 3.8 MMOL/L (ref 3.4–5.3)
PROT SERPL-MCNC: 8.1 G/DL (ref 6.8–8.8)
RBC # BLD AUTO: 5.11 10E6/UL (ref 4.4–5.9)
SODIUM SERPL-SCNC: 138 MMOL/L (ref 133–144)
WBC # BLD AUTO: 9.5 10E3/UL (ref 4–11)

## 2021-11-06 PROCEDURE — 96372 THER/PROPH/DIAG INJ SC/IM: CPT | Performed by: EMERGENCY MEDICINE

## 2021-11-06 PROCEDURE — 258N000003 HC RX IP 258 OP 636: Performed by: EMERGENCY MEDICINE

## 2021-11-06 PROCEDURE — 250N000011 HC RX IP 250 OP 636: Performed by: EMERGENCY MEDICINE

## 2021-11-06 PROCEDURE — 99284 EMERGENCY DEPT VISIT MOD MDM: CPT | Mod: 25

## 2021-11-06 PROCEDURE — 85025 COMPLETE CBC W/AUTO DIFF WBC: CPT | Performed by: EMERGENCY MEDICINE

## 2021-11-06 PROCEDURE — 96376 TX/PRO/DX INJ SAME DRUG ADON: CPT

## 2021-11-06 PROCEDURE — 96361 HYDRATE IV INFUSION ADD-ON: CPT

## 2021-11-06 PROCEDURE — 36415 COLL VENOUS BLD VENIPUNCTURE: CPT | Performed by: EMERGENCY MEDICINE

## 2021-11-06 PROCEDURE — 96374 THER/PROPH/DIAG INJ IV PUSH: CPT

## 2021-11-06 PROCEDURE — 84443 ASSAY THYROID STIM HORMONE: CPT | Performed by: INTERNAL MEDICINE

## 2021-11-06 PROCEDURE — 80053 COMPREHEN METABOLIC PANEL: CPT | Performed by: EMERGENCY MEDICINE

## 2021-11-06 PROCEDURE — 250N000009 HC RX 250

## 2021-11-06 PROCEDURE — 82040 ASSAY OF SERUM ALBUMIN: CPT | Performed by: EMERGENCY MEDICINE

## 2021-11-06 RX ORDER — WATER 10 ML/10ML
INJECTION INTRAMUSCULAR; INTRAVENOUS; SUBCUTANEOUS
Status: COMPLETED
Start: 2021-11-06 | End: 2021-11-06

## 2021-11-06 RX ORDER — ONDANSETRON 2 MG/ML
4 INJECTION INTRAMUSCULAR; INTRAVENOUS ONCE
Status: COMPLETED | OUTPATIENT
Start: 2021-11-06 | End: 2021-11-06

## 2021-11-06 RX ORDER — OLANZAPINE 10 MG/2ML
10 INJECTION, POWDER, FOR SOLUTION INTRAMUSCULAR ONCE
Status: COMPLETED | OUTPATIENT
Start: 2021-11-06 | End: 2021-11-06

## 2021-11-06 RX ADMIN — WATER 1.5 ML: 1 INJECTION INTRAMUSCULAR; INTRAVENOUS; SUBCUTANEOUS at 14:51

## 2021-11-06 RX ADMIN — ONDANSETRON 4 MG: 2 INJECTION INTRAMUSCULAR; INTRAVENOUS at 14:46

## 2021-11-06 RX ADMIN — OLANZAPINE 10 MG: 10 INJECTION, POWDER, FOR SOLUTION INTRAMUSCULAR at 14:50

## 2021-11-06 RX ADMIN — ONDANSETRON 4 MG: 2 INJECTION INTRAMUSCULAR; INTRAVENOUS at 12:50

## 2021-11-06 RX ADMIN — SODIUM CHLORIDE 1000 ML: 9 INJECTION, SOLUTION INTRAVENOUS at 14:49

## 2021-11-06 ASSESSMENT — MIFFLIN-ST. JEOR: SCORE: 1971.84

## 2021-11-06 ASSESSMENT — ENCOUNTER SYMPTOMS
NAUSEA: 1
ABDOMINAL PAIN: 1
FEVER: 0
VOMITING: 1
RECTAL PAIN: 1

## 2021-11-06 NOTE — ED TRIAGE NOTES
Pt was here yesterday for cyclical vomiting. Back today with abd pain and continuing to vomit, zofran at home not helping

## 2021-11-06 NOTE — ED PROVIDER NOTES
"  History   Chief Complaint:  Vomiting     The history is provided by the patient and a parent.      Ravi Dunne is a 31 year old male with a history of cyclic vomiting syndrome, autism, and hyperlipidemia who presents with his mother for vomiting. Per Chart Review, he was last seen in March and June of 2021, as well as yesterday for nausea and vomiting where he was also discharged the day of with Zofran. His mother stated that for a couple of days, he has been vomiting, feeling nauseous, anxious, as well as having abdominal pain and rectal pain. In addition, she has found blood in his underwear. Today, at about 800 to when he arrived in to the emergency room, he has vomited a total of 3 times. Just before he was seen, he had a bowel movement, where blood was in his stool, and had a hemorrhoid on the toilet seat. He states that his stomach hurts the most. The patient denies any fevers.    Review of Systems   Constitutional: Negative for fever.   Gastrointestinal: Positive for abdominal pain, nausea, rectal pain and vomiting (excessive).   Psychiatric/Behavioral:        Anxiety   All other systems reviewed and are negative.      Allergies:  Sulfa Drugs    Medications:  Cetirizine  Lexapro  Hydroxyzine  Levothyroxine  Lorazepam  Reglan  Omeprazole  Oxcarbazepine  Simethicone  Imitrex    Past Medical History:    Anxiety  Autism  Autoimmune hypothyroidism  Cyclic vomiting syndrome  Development delay  Hyperlipidemia  Mood disorder      Family History:    Alcoholism - father  Throat cancer - father  Autism spectrum disorder - brother    Social History:  The patient arrived with his mother.  The patient likes to eat [employer].    Physical Exam     Patient Vitals for the past 24 hrs:   BP Temp Pulse Resp SpO2 Height Weight   11/06/21 1500 (!) 148/93 -- 76 18 97 % -- --   11/06/21 1239 (!) 149/77 97  F (36.1  C) 77 20 98 % 1.93 m (6' 4\") 92.5 kg (204 lb)       Physical Exam  Eye:  Pupils are equal, round, and reactive.  " Extraocular movements intact.    ENT:  No rhinorrhea.  Moist mucus membranes.  Normal tongue and tonsil.    Cardiac:  Regular rate and rhythm.  No murmurs, gallops, or rubs.    Pulmonary:  Clear to auscultation bilaterally.  No wheezes, rales, or rhonchi.    Abdomen:  Mile tenderness in the epigastric without rebound.    Musculoskeletal:  Normal movement of all extremities without evidence for deficit.    Skin:  Warm and dry without rashes.    Neurologic:  Non-focal exam without asymmetric weakness or numbness.     Psychiatric:  Normal affect with appropriate interaction with examiner. Patient is autistic though at baseline per mother.       Emergency Department Course     Emergency Department Course:  Reviewed:  I reviewed the patient's nursing notes, vitals, past medical records, and Care Everywhere.     Assessments:  1435 I performed an exam of the patient and obtained history, as documented above.   1616 I rechecked the patient and they were feeling better to go home.    Interventions:  1250 Zofran 4 mg, IV  1446 ondansetron (ZOFRAN) injection 4 mg, IV  1449 0.9% sodium chloride BOLUS, IV  1450 OLANZapine (zyPREXA) injection 10 mg, IV  1451 sterile water (preservative free) injection, IV    Disposition:  The patient was discharged to home.     Impression & Plan     Medical Decision Making:  This unfortunate 31-year-old autistic patient with a history of cyclic vomiting returns to us after evaluation yesterday for ongoing issues of vomiting.  His mother notes that this is an ongoing issue for him, typically flaring up every 2 to 3 months.  No serious pathology has ever been found.  On my exam, he has some minimal discomfort in his epigastrium, but otherwise has no other worrisome signs on physical exam.  Laboratory investigation was obtained which is unremarkable.  He was given a dose of Zyprexa and Zofran with complete and total resolution of all symptoms.  I feel he is safe for discharge home without a CT and  the mother is in agreement.  They are advised to continue to work through their primary team on ways of controlling this.  Otherwise, they will return to us for any worsening of condition or other emergent concerns.    Diagnosis:    ICD-10-CM    1. Cyclical vomiting  R11.15        Discharge Medications:  Discharge Medication List as of 11/6/2021  4:25 PM        November 6, 2021   [facility] Emergency Department     Trierweiler, Chad A, MD  11/07/21 0116

## 2021-11-08 ENCOUNTER — OFFICE VISIT (OUTPATIENT)
Dept: INTERNAL MEDICINE | Facility: CLINIC | Age: 31
End: 2021-11-08
Payer: MEDICAID

## 2021-11-08 VITALS
HEART RATE: 76 BPM | TEMPERATURE: 97.9 F | OXYGEN SATURATION: 98 % | RESPIRATION RATE: 18 BRPM | DIASTOLIC BLOOD PRESSURE: 84 MMHG | SYSTOLIC BLOOD PRESSURE: 136 MMHG | WEIGHT: 201.6 LBS | BODY MASS INDEX: 24.54 KG/M2

## 2021-11-08 DIAGNOSIS — K64.4 EXTERNAL HEMORRHOIDS: Primary | ICD-10-CM

## 2021-11-08 DIAGNOSIS — R11.15 CYCLIC VOMITING SYNDROME: ICD-10-CM

## 2021-11-08 DIAGNOSIS — Z79.899 HIGH RISK MEDICATION USE: ICD-10-CM

## 2021-11-08 DIAGNOSIS — Z23 NEED FOR PROPHYLACTIC VACCINATION AND INOCULATION AGAINST INFLUENZA: ICD-10-CM

## 2021-11-08 DIAGNOSIS — R62.50 DEVELOPMENT DELAY: ICD-10-CM

## 2021-11-08 DIAGNOSIS — E06.3 AUTOIMMUNE HYPOTHYROIDISM: ICD-10-CM

## 2021-11-08 LAB — TSH SERPL DL<=0.005 MIU/L-ACNC: 3.21 MU/L (ref 0.4–4)

## 2021-11-08 PROCEDURE — 90686 IIV4 VACC NO PRSV 0.5 ML IM: CPT | Performed by: INTERNAL MEDICINE

## 2021-11-08 PROCEDURE — 99214 OFFICE O/P EST MOD 30 MIN: CPT | Mod: 25 | Performed by: INTERNAL MEDICINE

## 2021-11-08 PROCEDURE — 90471 IMMUNIZATION ADMIN: CPT | Performed by: INTERNAL MEDICINE

## 2021-11-08 NOTE — PROGRESS NOTES
Assessment & Plan     (K64.4) External hemorrhoids  (primary encounter diagnosis)  Comment: fiber, stool softner.   He and mother would liek it revmoed  Referral to colo rectal surgery clinic for discussino of removal.   Plan: Colorectal Surgery Referral, Colorectal Surgery        Referral            (R11.15) Cyclic vomiting syndrome  Comment: another episode, unclesar triggers, but suspect that many of them may be behavioral.   Plan:     (E06.3) Autoimmune hypothyroidism  Comment: recheck lanbs, titrate meds as needed.   Plan: TSH with free T4 reflex            (R62.50) Development delay  Comment: Known issue that I take into account for their medical decisions; no current exacerbations, or new concerns.  This condition is currently controlled on the current medical regimen.  Continue current therapy.   Plan: TSH with free T4 reflex, Oxcarbazepine level            (Z79.899) High risk medication use  Comment: Known issue that I take into account for their medical decisions; no current exacerbations, or new concerns.  This condition is currently controlled on the current medical regimen.  Continue current therapy.   The primary psychiatrist has requested labs that have not yet been done.   Orders placed today that can be done on the exisitng specimen from recent ER visit.   Will send all results to the psychiatry clinic (Dr. Burr and [facility])  Plan: TSH with free T4 reflex, Oxcarbazepine level            (Z23) Need for prophylactic vaccination and inoculation against influenza  Comment:   Plan: INFLUENZA VACCINE IM > 6 MONTHS VALENT IIV4         (AFLURIA/FLUZONE)                            Return in about 6 months (around 5/8/2022) for Routine Visit.    Bhaskar Gandara MD  [facility] JOBY Rodrigues is a 31 year old who presents for the following health issues  accompanied by his mother.    HPI     ED/UC Followup:    Facility:  [facility]   Date of  visit: 11/06/2021  Reason for visit: Vomiting  Current Status: Improving would also like to discuss hemorrhoids.     Reviewed [facility] Emergency Room notes.   Mom says that they used zyprexa injeciton that really helped with the agitation.     vomimting has not stopped and now reutrned to noirmal intake (as he usually dose)  No clear triggers idntified for this most recent episode.     Large hemorrhoid noticed.   Patient and mother complianing of it, they would like remvoed.   occ the hemorhoid gets imflamed and has blood on underwear.     BMs are usually once per day, difficult to get the true stool frequency and consistency from the patient due to developmental delay.        2.  reviwed most recent psychiatric clicni notes.   The primary psychiatrist has requeted labs that have not been done.   Orders placed to dya, can be done with exisitng specimen from recent ER visit.     3.  History of hypothyroidism.  On replacement therapy.  He has not experienced any significant side effects of this medication.   The patient denies of fatigue, weight changes, heat/cold intolerance, hair changes, nail changes, bowel changes.     Latest labs reviewed:    Lab Results   Component Value Date    TSH 0.45 07/06/2020    TSH 0.38 04/03/2019    TSH 0.80 05/02/2018    TSH 1.98 11/15/2017    TSH 8.48 08/11/2017    TSH 7.47 02/14/2017    TSH 1.17 06/24/2014    TSH 2.80 02/02/2010    TSH 2.52 04/23/2009         4.  The patient has had a history of former obesity.  Reviewed the weigth curves.   Their current BMI is:  Body mass index is 24.54 kg/m .  Reviewed previous attempts at weight loss which have not been successful in producing prolonged weigth loss.   Discussed current eating and exercise habits.  Has been eating a lot loss fast foods.     Reviewed weights in chart:  Wt Readings from Last 10 Encounters:   11/08/21 91.4 kg (201 lb 9.6 oz)   11/06/21 92.5 kg (204 lb)   11/05/21 92.5 kg (204 lb)   06/14/21 92.8 kg  (204 lb 8 oz)   04/09/21 95.5 kg (210 lb 9.6 oz)   02/16/21 99.3 kg (219 lb)   10/06/20 92.6 kg (204 lb 1.6 oz)   10/03/20 97.5 kg (215 lb)   07/06/20 94.6 kg (208 lb 8 oz)   06/26/20 94.6 kg (208 lb 9.6 oz)          **I reviewed the information recorded in the patient's EPIC chart (including but not limited to medical history, surgical history, family history, problem list, medication list, and allergy list) and updated the information as indicated based on the patients reported information.           Review of Systems   Constitutional, HEENT, cardiovascular, pulmonary, gi and gu systems are negative, except as otherwise noted.      Objective    /84   Pulse 76   Temp 97.9  F (36.6  C) (Tympanic)   Resp 18   Wt 91.4 kg (201 lb 9.6 oz)   SpO2 98%   BMI 24.54 kg/m    Body mass index is 24.54 kg/m .  Physical Exam   GENERAL alert and no distress  EYES:  Normal sclera,conjunctiva, EOMI  HENT: oral and posterior pharynx without lesions or erythema, facies symmetric  NECK: Neck supple. No LAD, without thyroidmegaly.  RESP: Clear to ausculation bilaterally without wheezes or crackles. Normal BS in all fields.  CV: RRR normal S1S2 without murmurs, rubs or gallops.  LYMPH: no cervical lymph adenopathy appreciated  MS: extremities- no gross deformities of the visible extremities noted,   EXT:  no lower extremity edema  PSYCH: Alert and oriented times 3; speech- coherent  SKIN:  No obvious significant skin lesions on visible portions of face  RECTAL:  Large external hemorrhoid

## 2021-11-08 NOTE — PATIENT INSTRUCTIONS
*  checkign the blood tests as requested by Dr. Burr, we will send these results to him when they are back     *  Continue all medications at the same doses.  Contact your usual pharmacy if you need refills.     *  Return to see me in 6 months, sooner if needed.  Use [employer] or Call [phone] to schedule the appointment with me.         HEMORRHOIDS:     *  Normalize the bowel patterns by increasing the intake of fiber and maintaining adequate water intake (at least 6-8 glasses per day).    *  Take the fiber supplement of your choice:  Capsules, tablets, chewable tablets, powders, etc.   Find one that works best for you and take it regularly.     --take Fiber capsule supplements, 2-3 capsules per day.      *  If the stools are consistently harder, then consider a stool softner such as colace (docusate) or senekot to help make the BMs larger and softer so they pass easier.           *  Preparation H (suppositories or cream) or similar over the counter product to help sooth these irritations.    *  Consider using Preparation H wipes after passing BMs as needed.  Do not overcleanse the rectal area as this will lead to more rectal irritation.     *  Sometimes sitting in a bathtub with Epsom salts for 10-15 minutes can help hemorrhoids as well.     *  If you are still having troubles with hemorrhoids despite these measures, then I can refer you to the Gastroenterology Clinic or the [facility] rectal surgeons who take care of this with procedures if needed.      [facility] Rectal Surg   [address] [place] [id number]   Phone: [phone]   Fax: [phone]

## 2021-12-13 ENCOUNTER — TRANSFERRED RECORDS (OUTPATIENT)
Dept: HEALTH INFORMATION MANAGEMENT | Facility: CLINIC | Age: 31
End: 2021-12-13
Payer: MEDICAID

## 2022-01-15 DIAGNOSIS — E06.3 AUTOIMMUNE HYPOTHYROIDISM: ICD-10-CM

## 2022-01-17 RX ORDER — LEVOTHYROXINE SODIUM 125 UG/1
TABLET ORAL
Qty: 90 TABLET | Refills: 1 | Status: SHIPPED | OUTPATIENT
Start: 2022-01-17 | End: 2022-07-15

## 2022-01-20 DIAGNOSIS — J30.2 SEASONAL ALLERGIC RHINITIS, UNSPECIFIED TRIGGER: ICD-10-CM

## 2022-01-21 RX ORDER — CETIRIZINE HYDROCHLORIDE 10 MG/1
TABLET ORAL
Qty: 90 TABLET | Refills: 2 | Status: SHIPPED | OUTPATIENT
Start: 2022-01-21 | End: 2022-10-28

## 2022-01-21 NOTE — TELEPHONE ENCOUNTER
Prescription approved per [facility] Refill Protocol.  Navin Novoa RN  [facility] Triage Nurse

## 2022-01-25 NOTE — TELEPHONE ENCOUNTER
Routing refill request to provider for review/approval because:    TSH   Date Value Ref Range Status   07/06/2020 0.45 0.40 - 4.00 mU/L Final             Faye Cain RN  [facility]   Yes

## 2022-02-01 ENCOUNTER — HOSPITAL ENCOUNTER (OUTPATIENT)
Dept: NUCLEAR MEDICINE | Facility: CLINIC | Age: 32
Setting detail: NUCLEAR MEDICINE
Discharge: HOME | End: 2022-02-01
Attending: INTERNAL MEDICINE | Admitting: INTERNAL MEDICINE
Payer: MEDICAID

## 2022-02-01 DIAGNOSIS — K64.4 EXTERNAL HEMORRHOIDS: ICD-10-CM

## 2022-02-01 DIAGNOSIS — R10.84 GENERALIZED ABDOMINAL PAIN: ICD-10-CM

## 2022-02-01 DIAGNOSIS — R11.15 CYCLIC VOMITING SYNDROME: ICD-10-CM

## 2022-02-01 PROCEDURE — A9541 TC99M SULFUR COLLOID: HCPCS | Performed by: INTERNAL MEDICINE

## 2022-02-01 PROCEDURE — 343N000001 HC RX 343: Performed by: INTERNAL MEDICINE

## 2022-02-01 PROCEDURE — 78264 GASTRIC EMPTYING IMG STUDY: CPT

## 2022-02-01 RX ADMIN — Medication 1 MILLICURIE: at 07:52

## 2022-02-20 ENCOUNTER — HOSPITAL ENCOUNTER (EMERGENCY)
Facility: CLINIC | Age: 32
Discharge: HOME | End: 2022-02-20
Attending: EMERGENCY MEDICINE | Admitting: EMERGENCY MEDICINE
Payer: MEDICAID

## 2022-02-20 VITALS
SYSTOLIC BLOOD PRESSURE: 130 MMHG | OXYGEN SATURATION: 97 % | BODY MASS INDEX: 24.47 KG/M2 | WEIGHT: 201 LBS | RESPIRATION RATE: 20 BRPM | HEART RATE: 76 BPM | TEMPERATURE: 97 F | DIASTOLIC BLOOD PRESSURE: 74 MMHG

## 2022-02-20 DIAGNOSIS — R11.0 NAUSEA: ICD-10-CM

## 2022-02-20 DIAGNOSIS — R10.13 EPIGASTRIC PAIN: ICD-10-CM

## 2022-02-20 PROCEDURE — 250N000011 HC RX IP 250 OP 636: Performed by: EMERGENCY MEDICINE

## 2022-02-20 PROCEDURE — 96361 HYDRATE IV INFUSION ADD-ON: CPT

## 2022-02-20 PROCEDURE — 99284 EMERGENCY DEPT VISIT MOD MDM: CPT | Mod: 25

## 2022-02-20 PROCEDURE — 96374 THER/PROPH/DIAG INJ IV PUSH: CPT

## 2022-02-20 PROCEDURE — 258N000003 HC RX IP 258 OP 636: Performed by: EMERGENCY MEDICINE

## 2022-02-20 PROCEDURE — 96372 THER/PROPH/DIAG INJ SC/IM: CPT | Performed by: EMERGENCY MEDICINE

## 2022-02-20 RX ORDER — ONDANSETRON 2 MG/ML
4 INJECTION INTRAMUSCULAR; INTRAVENOUS ONCE
Status: COMPLETED | OUTPATIENT
Start: 2022-02-20 | End: 2022-02-20

## 2022-02-20 RX ORDER — OLANZAPINE 10 MG/2ML
10 INJECTION, POWDER, FOR SOLUTION INTRAMUSCULAR ONCE
Status: COMPLETED | OUTPATIENT
Start: 2022-02-20 | End: 2022-02-20

## 2022-02-20 RX ORDER — WATER 10 ML/10ML
INJECTION INTRAMUSCULAR; INTRAVENOUS; SUBCUTANEOUS
Status: DISCONTINUED
Start: 2022-02-20 | End: 2022-02-21 | Stop reason: HOSPADM

## 2022-02-20 RX ADMIN — SODIUM CHLORIDE 1000 ML: 9 INJECTION, SOLUTION INTRAVENOUS at 21:14

## 2022-02-20 RX ADMIN — OLANZAPINE 10 MG: 10 INJECTION, POWDER, FOR SOLUTION INTRAMUSCULAR at 21:26

## 2022-02-20 RX ADMIN — ONDANSETRON 4 MG: 2 INJECTION INTRAMUSCULAR; INTRAVENOUS at 21:25

## 2022-02-20 ASSESSMENT — ENCOUNTER SYMPTOMS
NAUSEA: 1
FEVER: 0
VOMITING: 1
ABDOMINAL PAIN: 1

## 2022-02-21 ENCOUNTER — PATIENT OUTREACH (OUTPATIENT)
Dept: INTERNAL MEDICINE | Facility: CLINIC | Age: 32
End: 2022-02-21

## 2022-02-21 ENCOUNTER — OFFICE VISIT (OUTPATIENT)
Dept: INTERNAL MEDICINE | Facility: CLINIC | Age: 32
End: 2022-02-21
Payer: MEDICAID

## 2022-02-21 VITALS
OXYGEN SATURATION: 97 % | BODY MASS INDEX: 25.18 KG/M2 | HEART RATE: 77 BPM | WEIGHT: 206.9 LBS | DIASTOLIC BLOOD PRESSURE: 75 MMHG | TEMPERATURE: 97.4 F | SYSTOLIC BLOOD PRESSURE: 133 MMHG

## 2022-02-21 DIAGNOSIS — F39 EPISODIC MOOD DISORDER (H): ICD-10-CM

## 2022-02-21 DIAGNOSIS — R11.15 CYCLIC VOMITING SYNDROME: Primary | ICD-10-CM

## 2022-02-21 DIAGNOSIS — E06.3 AUTOIMMUNE HYPOTHYROIDISM: ICD-10-CM

## 2022-02-21 PROCEDURE — 99214 OFFICE O/P EST MOD 30 MIN: CPT | Performed by: INTERNAL MEDICINE

## 2022-02-21 NOTE — PATIENT INSTRUCTIONS
*  The next time you have another episode of acute vomiting, try the following medications:   (these were the same medications that the Emergency Room gave you that made you feel better).     --Quetiapine 50 mg tablet one time dose     AND     --Ondansetron 4 mg     *  Continue all medications at the same doses.  Contact your usual pharmacy if you need refills.

## 2022-02-21 NOTE — PROGRESS NOTES
Assessment & Plan     (R11.15) Cyclic vomiting syndrome  (primary encounter diagnosis)  Comment: No episode, no specific trigger.  Nuclear gastric emptying study shows mild delayed gastric emptying however unclear of the significance for this and unclear if he would require any specific treatments above what he is already getting.  As always I suspect this is most likely behaviorally mediated, trigger for him is increased rest and anxiety.    *  The next time you have another episode of acute vomiting, try the following medications:   (these were the same medications that the Emergency Room gave you that made you feel better).     --Quetiapine 50 mg tablet one time dose     AND     --Ondansetron 4 mg     *  Continue all medications at the same doses.  Contact your usual pharmacy if you need refills.           Plan:     (F39) Episodic mood disorder (H)  Comment: Known issue that I take into account for their medical decisions; no current exacerbations, or new concerns.  This condition is currently controlled on the current medical regimen.  Continue current therapy.   Reviewed most recent notes from the [facility] psychiatry clinic.  Plan:     (E06.3) Autoimmune hypothyroidism  Comment: This condition is currently controlled on the current medical regimen.  Continue current therapy.   Plan:                     Return in about 6 months (around 8/21/2022) for Annual physical.    Bhaskar Gandara MD  [facility]    Emma Rodrigues is a 31 year old who presents for the following health issues  accompanied by his mother.    HPI       1.  Another visit to the emergency room for acute cyclic vomiting syndrome with acute vomiting.  Reviewed ER notes.  Patient was given single dose of intramuscular olanzapine and ondansetron which helped relieve his symptoms.  No laboratory studies were performed.  No specific triggers other than overall increased mild anxiety since the death of his  father 6 months ago.    Feeling better  Eating well.    Seen by gastroenterology few times in late 2021.  Recent nuclear medicine gastric emptying study showed mildly delayed gastric emptying.    Has yet to follow-up with gastroenterology about this.    2.  History of hypothyroidism.  On replacement therapy.  He has not experienced any significant side effects of this medication.   The patient denies of fatigue, weight changes, heat/cold intolerance, hair changes, nail changes, bowel changes.     Latest labs reviewed:    Lab Results   Component Value Date    TSH 3.21 11/06/2021    TSH 0.45 07/06/2020    TSH 0.38 04/03/2019    TSH 0.80 05/02/2018    TSH 1.98 11/15/2017    TSH 8.48 08/11/2017    TSH 7.47 02/14/2017    TSH 1.17 06/24/2014    TSH 2.80 02/02/2010    TSH 2.52 04/23/2009         **I reviewed the information recorded in the patient's EPIC chart (including but not limited to medical history, surgical history, family history, problem list, medication list, and allergy list) and updated the information as indicated based on the patients reported information.         Review of Systems   Constitutional, HEENT, cardiovascular, pulmonary, gi and gu systems are negative, except as otherwise noted.      Objective    /75 (Cuff Size: Adult Regular)   Pulse 77   Temp 97.4  F (36.3  C) (Tympanic)   Wt 93.8 kg (206 lb 14.4 oz)   SpO2 97%   BMI 25.18 kg/m    Body mass index is 25.18 kg/m .  Physical Exam   GENERAL alert and no distress  EYES:  Normal sclera,conjunctiva, EOMI  HENT: facies symmetric  MS: extremities- no gross deformities of the visible extremities noted,   PSYCH: Alert and oriented times 3; speech- coherent  SKIN:  No obvious significant skin lesions on visible portions of face   NEURO:  Normal facial movements.  Appears to move normally

## 2022-02-21 NOTE — ED PROVIDER NOTES
History   Chief Complaint:  Nausea and Vomiting.     The history is provided by a parent and the patient.      Ravi Dunne is a 31 year old male with history of cyclic vomiting syndrome, autism, and hyperlipidemia who presents with nausea and epigastric pain. Per the patient's mother, he endorses epigastric abdominal pain and nausea but has not vomited yet.. She has not given the patient anything for the nausea. She also notes that the patient participated in a fasting study a few weeks ago where he had a gastric emptying study and it showed delayed gastric emptying. She denies fever or marijuana use. The patient endorses nausea and confirms eating a ham sandwich for lunch.  He was seen last in November and this usually happens every 2 to 3 months.  He got Zyprexa and Zofran and was dramatically improved and went home at that time.    EXAM: NM GASTRIC EMPTYING  LOCATION: [facility]  DATE/TIME: 2/1/2022 7:36 AM    IMPRESSION:    Delayed gastric emptying.      Review of Systems   Constitutional: Negative for fever.   Gastrointestinal: Positive for abdominal pain, nausea and vomiting.   All other systems reviewed and are negative.        Allergies:  Sulfa Drugs    Medications:  Lexapro  Atarax  Levothyroxine  Ativan  Reglan  Prilosec  Zofran  Trileptal  Seroquel  Mylicon  Imitrex    Past Medical History:     Anxiety  Autism  Autoimmune hypothyroidism  Cyclic vomiting syndrome  Development delay  Hyperlipidemia  Mood disorder     Family History:    Father: alcoholism, throat cancer  Brother: autistic    Social History:  The patient presents to the ED with his mother.    Physical Exam     Patient Vitals for the past 24 hrs:   BP Temp Pulse Resp SpO2 Weight   02/20/22 2239 130/74 -- 76 20 97 % --   02/20/22 2038 (!) 142/81 97  F (36.1  C) 80 16 96 % 91.2 kg (201 lb)       Physical Exam  Nursing note and vitals reviewed.    Constitutional:  Alert but appears uncomfortable.    HENT:     Nose  normal.  No discharge.      Oral mucosa is moist.  Eyes:    Conjunctivae are normal without injection.  Pupils are equal.  Cardiovascular:  Normal rate, regular rhythm with normal S1 and S2.      Normal heart sounds and peripheral pulses 2+ and equal.       No murmur or janie.  Pulmonary:  Effort normal and breath sounds clear to auscultation bilaterally.     No respiratory distress.  No stridor.     No wheezes. No rales.     GI:    Soft. No distension and no mass.  There is some epigastric tenderness without rebound or guarding.  Neurological:   Alert and appropriate. No focal weakness.     Exhibits good muscle tone. Coordination normal.      GCS eye subscore is 4. GCS verbal subscore is 5.      GCS motor subscore is 6.   Skin:    Skin is warm and dry. No rash noted.     Emergency Department Course     Emergency Department Course:         Reviewed:  I reviewed nursing notes, vitals, past medical history and Care Everywhere    Assessments:  2056 I obtained history and examined the patient as noted above.     Interventions:  2114 NS Bolus 1 L IV  2125 Zofran 4 mg IV  2126 Zyprexa 10 mg Intramuscular    Disposition:  The patient was discharged to home.     Impression & Plan     Medical Decision Making:  Patient comes in with a history of cyclical vomiting and intermittent abdominal pain.  This just started an hour before presentation and no fevers or chills.  His exam here just reveals some mild epigastric pain.  His work-ups in the past have usually been negative.  I explained to mom that I would give him a liter of fluids, he was given 4 mg of IV Zofran and 10 mg of IM Zyprexa.  After about an hour, his symptoms had resolved and he was feeling much better and wanted to go home.  Mom's questions were answered.  They will follow up as scheduled to go over the gastric emptying study results and for further recommendations.  He can always return if he develops fevers with worsening pain and vomiting.    Home, bland  foods the next 24 hours.  Schedule an appointment with your doctor in the clinic.  If you develop fevers with worsening pain and vomiting, return to the emergency room.    Diagnosis:    ICD-10-CM    1. Epigastric pain  R10.13    2. Nausea  R11.0        Discharge Medications:  Discharge Medication List as of 2/20/2022 10:26 PM          Scribe Disclosure:  I, Gaurav Henry, am serving as a scribe at 8:52 PM on 2/20/2022 to document services personally performed by Birdie Davenport MD based on my observations and the provider's statements to me.          Birdie Davenport MD  02/21/22 0033

## 2022-02-21 NOTE — TELEPHONE ENCOUNTER
What type of discharge? Emergency Department  Risk of Hospital admission or ED visit: 69%  Is a TCM episode required? No  When should the patient follow up with PCP? within 30 days of discharge.    Bushra Bautista RN  [facility]

## 2022-02-21 NOTE — TELEPHONE ENCOUNTER
In review of chart, pt has a future appt with Dr. Gandara in 15 minutes. No phone call necessary at this time.     Appointments in Next Year    Feb 21, 2022  3:30 PM  (Arrive by 3:15 PM)  ED/Hospital Follow Up with Bhaskar Gandara MD  [facility] ([facility] ) [phone]

## 2022-02-21 NOTE — DISCHARGE INSTRUCTIONS
Home, bland foods the next 24 hours.  Schedule an appointment with your doctor in the clinic.  If you develop fevers with worsening pain and vomiting, return to the emergency room.

## 2022-02-21 NOTE — ED TRIAGE NOTES
Pt here with mother for abd pain that started around 6pm. Mother states he has a hx of this and is frequently see in the ED. PT alert follows commands. Points to his epigastric area when asked if he's in pain. VSS. Pt of SNEHAL.

## 2022-04-10 ENCOUNTER — HEALTH MAINTENANCE LETTER (OUTPATIENT)
Age: 32
End: 2022-04-10

## 2022-05-12 ENCOUNTER — TRANSFERRED RECORDS (OUTPATIENT)
Dept: HEALTH INFORMATION MANAGEMENT | Facility: CLINIC | Age: 32
End: 2022-05-12
Payer: MEDICAID

## 2022-05-14 ENCOUNTER — OFFICE VISIT (OUTPATIENT)
Dept: URGENT CARE | Facility: URGENT CARE | Age: 32
End: 2022-05-14
Payer: MEDICAID

## 2022-05-14 VITALS
HEART RATE: 87 BPM | RESPIRATION RATE: 24 BRPM | WEIGHT: 208 LBS | BODY MASS INDEX: 25.32 KG/M2 | OXYGEN SATURATION: 98 % | TEMPERATURE: 98.1 F | DIASTOLIC BLOOD PRESSURE: 76 MMHG | SYSTOLIC BLOOD PRESSURE: 137 MMHG

## 2022-05-14 DIAGNOSIS — R05.9 COUGH: Primary | ICD-10-CM

## 2022-05-14 LAB — DEPRECATED S PYO AG THROAT QL EIA: NEGATIVE

## 2022-05-14 PROCEDURE — U0003 INFECTIOUS AGENT DETECTION BY NUCLEIC ACID (DNA OR RNA); SEVERE ACUTE RESPIRATORY SYNDROME CORONAVIRUS 2 (SARS-COV-2) (CORONAVIRUS DISEASE [COVID-19]), AMPLIFIED PROBE TECHNIQUE, MAKING USE OF HIGH THROUGHPUT TECHNOLOGIES AS DESCRIBED BY CMS-2020-01-R: HCPCS | Performed by: NURSE PRACTITIONER

## 2022-05-14 PROCEDURE — 87651 STREP A DNA AMP PROBE: CPT | Performed by: NURSE PRACTITIONER

## 2022-05-14 PROCEDURE — 99213 OFFICE O/P EST LOW 20 MIN: CPT | Mod: CS | Performed by: NURSE PRACTITIONER

## 2022-05-14 PROCEDURE — U0005 INFEC AGEN DETEC AMPLI PROBE: HCPCS | Performed by: NURSE PRACTITIONER

## 2022-05-14 RX ORDER — AZITHROMYCIN 250 MG/1
TABLET, FILM COATED ORAL
Qty: 6 TABLET | Refills: 0 | Status: SHIPPED | OUTPATIENT
Start: 2022-05-14 | End: 2022-05-19

## 2022-05-14 RX ORDER — BENZONATATE 200 MG/1
200 CAPSULE ORAL 3 TIMES DAILY PRN
Qty: 20 CAPSULE | Refills: 0 | Status: SHIPPED | OUTPATIENT
Start: 2022-05-14 | End: 2023-05-17

## 2022-05-14 NOTE — PROGRESS NOTES
Assessment & Plan       ICD-10-CM    1. Cough  R05.9 Symptomatic; Unknown COVID-19 Virus (Coronavirus) by PCR Nose     Streptococcus A Rapid Screen w/Reflex to PCR - Clinic Collect     Group A Streptococcus PCR Throat Swab     benzonatate (TESSALON) 200 MG capsule     azithromycin (ZITHROMAX) 250 MG tablet   tessalon PRN and zpak daily x 5 days  RST negative.    Throat culture and COVID swab pending.    Push fluids  Lots of handwashing.    Rest as able.   Will call if any other labs positive.    F/u in the clinic if symptoms persist or worsen.     - Symptomatic; Unknown COVID-19 Virus (Coronavirus) by PCR Nose  - Streptococcus A Rapid Screen w/Reflex to PCR - Clinic Collect  - Group A Streptococcus PCR Throat Swab     Return in about 2 days (around 5/16/2022) for with regular provider if symptoms persist.    SAURAV Delacruz CNP  [facility] JOBY Rodrigues is a 31 year old male who presents to clinic today for the following health issues:  Chief Complaint   Patient presents with     Cough     Lingering for 2 weeks     Pharyngitis     Since today     HPI    URI Adult    Onset of symptoms was 2 week(s) ago.  Course of illness is worsening.    Severity moderate  Current and Associated symptoms: runny nose, stuffy nose, cough - productive, sore throat and fatigue  Treatment measures tried include OTC Cough med, Fluids and Rest.  Predisposing factors include seasonal allergies.      Review of Systems  Constitutional, HEENT, cardiovascular, pulmonary, GI, , musculoskeletal, neuro, skin, endocrine and psych systems are negative, except as otherwise noted.      Objective    /76 (BP Location: Left arm, Patient Position: Sitting, Cuff Size: Adult Regular)   Pulse 87   Temp 98.1  F (36.7  C) (Tympanic)   Resp 24   Wt 94.3 kg (208 lb)   SpO2 98%   BMI 25.32 kg/m    Physical Exam   GENERAL: healthy, alert and no distress  EYES: Eyes grossly normal to inspection, PERRL and  conjunctivae and sclerae normal  HENT: ear canals and TM's normal, nose and mouth without ulcers or lesions  NECK: no adenopathy, no asymmetry, masses, or scars and thyroid normal to palpation  RESP: lungs clear to auscultation - no rales, rhonchi or wheezes and decreased breath sounds bibasilar  CV: regular rate and rhythm, normal S1 S2, no S3 or S4, no murmur, click or rub, no peripheral edema and peripheral pulses strong  ABDOMEN: soft, nontender, no hepatosplenomegaly, no masses and bowel sounds normal  MS: no gross musculoskeletal defects noted, no edema  SKIN: no suspicious lesions or rashes

## 2022-05-15 LAB
GROUP A STREP BY PCR: NOT DETECTED
SARS-COV-2 RNA RESP QL NAA+PROBE: NEGATIVE

## 2022-06-19 ENCOUNTER — HOSPITAL ENCOUNTER (EMERGENCY)
Facility: CLINIC | Age: 32
Discharge: HOME | End: 2022-06-20
Attending: EMERGENCY MEDICINE | Admitting: EMERGENCY MEDICINE
Payer: MEDICAID

## 2022-06-19 DIAGNOSIS — R11.2 NON-INTRACTABLE VOMITING WITH NAUSEA, UNSPECIFIED VOMITING TYPE: ICD-10-CM

## 2022-06-19 DIAGNOSIS — K31.84 GASTROPARESIS: ICD-10-CM

## 2022-06-19 LAB
ANION GAP SERPL CALCULATED.3IONS-SCNC: 4 MMOL/L (ref 3–14)
BASOPHILS # BLD AUTO: 0 10E3/UL (ref 0–0.2)
BASOPHILS NFR BLD AUTO: 0 %
BUN SERPL-MCNC: 13 MG/DL (ref 7–30)
CALCIUM SERPL-MCNC: 9.4 MG/DL (ref 8.5–10.1)
CHLORIDE BLD-SCNC: 99 MMOL/L (ref 94–109)
CO2 SERPL-SCNC: 30 MMOL/L (ref 20–32)
CREAT SERPL-MCNC: 0.99 MG/DL (ref 0.66–1.25)
EOSINOPHIL # BLD AUTO: 0.1 10E3/UL (ref 0–0.7)
EOSINOPHIL NFR BLD AUTO: 1 %
ERYTHROCYTE [DISTWIDTH] IN BLOOD BY AUTOMATED COUNT: 11.8 % (ref 10–15)
GFR SERPL CREATININE-BSD FRML MDRD: >90 ML/MIN/1.73M2
GLUCOSE BLD-MCNC: 108 MG/DL (ref 70–99)
HCT VFR BLD AUTO: 43.7 % (ref 40–53)
HGB BLD-MCNC: 15.1 G/DL (ref 13.3–17.7)
HOLD SPECIMEN: NORMAL
IMM GRANULOCYTES # BLD: 0 10E3/UL
IMM GRANULOCYTES NFR BLD: 0 %
LIPASE SERPL-CCNC: 180 U/L (ref 73–393)
LYMPHOCYTES # BLD AUTO: 1.7 10E3/UL (ref 0.8–5.3)
LYMPHOCYTES NFR BLD AUTO: 15 %
MCH RBC QN AUTO: 29.2 PG (ref 26.5–33)
MCHC RBC AUTO-ENTMCNC: 34.6 G/DL (ref 31.5–36.5)
MCV RBC AUTO: 85 FL (ref 78–100)
MONOCYTES # BLD AUTO: 0.5 10E3/UL (ref 0–1.3)
MONOCYTES NFR BLD AUTO: 4 %
NEUTROPHILS # BLD AUTO: 9 10E3/UL (ref 1.6–8.3)
NEUTROPHILS NFR BLD AUTO: 80 %
NRBC # BLD AUTO: 0 10E3/UL
NRBC BLD AUTO-RTO: 0 /100
PLATELET # BLD AUTO: 182 10E3/UL (ref 150–450)
POTASSIUM BLD-SCNC: 3.6 MMOL/L (ref 3.4–5.3)
RBC # BLD AUTO: 5.17 10E6/UL (ref 4.4–5.9)
SODIUM SERPL-SCNC: 133 MMOL/L (ref 133–144)
WBC # BLD AUTO: 11.3 10E3/UL (ref 4–11)

## 2022-06-19 PROCEDURE — 82248 BILIRUBIN DIRECT: CPT | Performed by: EMERGENCY MEDICINE

## 2022-06-19 PROCEDURE — 96375 TX/PRO/DX INJ NEW DRUG ADDON: CPT

## 2022-06-19 PROCEDURE — 83690 ASSAY OF LIPASE: CPT | Performed by: EMERGENCY MEDICINE

## 2022-06-19 PROCEDURE — 85025 COMPLETE CBC W/AUTO DIFF WBC: CPT | Performed by: EMERGENCY MEDICINE

## 2022-06-19 PROCEDURE — 99285 EMERGENCY DEPT VISIT HI MDM: CPT | Mod: 25

## 2022-06-19 PROCEDURE — C9803 HOPD COVID-19 SPEC COLLECT: HCPCS

## 2022-06-19 PROCEDURE — 87637 SARSCOV2&INF A&B&RSV AMP PRB: CPT | Performed by: EMERGENCY MEDICINE

## 2022-06-19 PROCEDURE — 96361 HYDRATE IV INFUSION ADD-ON: CPT

## 2022-06-19 PROCEDURE — 80053 COMPREHEN METABOLIC PANEL: CPT | Performed by: EMERGENCY MEDICINE

## 2022-06-19 PROCEDURE — 36415 COLL VENOUS BLD VENIPUNCTURE: CPT | Performed by: EMERGENCY MEDICINE

## 2022-06-19 PROCEDURE — 80048 BASIC METABOLIC PNL TOTAL CA: CPT | Performed by: EMERGENCY MEDICINE

## 2022-06-19 PROCEDURE — 96374 THER/PROPH/DIAG INJ IV PUSH: CPT | Mod: 59

## 2022-06-19 RX ORDER — KETOROLAC TROMETHAMINE 15 MG/ML
15 INJECTION, SOLUTION INTRAMUSCULAR; INTRAVENOUS ONCE
Status: COMPLETED | OUTPATIENT
Start: 2022-06-20 | End: 2022-06-20

## 2022-06-19 RX ORDER — ONDANSETRON 2 MG/ML
4 INJECTION INTRAMUSCULAR; INTRAVENOUS EVERY 30 MIN PRN
Status: DISCONTINUED | OUTPATIENT
Start: 2022-06-19 | End: 2022-06-20 | Stop reason: HOSPADM

## 2022-06-19 ASSESSMENT — ENCOUNTER SYMPTOMS
HEADACHES: 0
DIARRHEA: 0
SORE THROAT: 0
COUGH: 0
ABDOMINAL PAIN: 1
DYSURIA: 1
NAUSEA: 1
VOMITING: 1
BACK PAIN: 0

## 2022-06-20 ENCOUNTER — PATIENT OUTREACH (OUTPATIENT)
Dept: INTERNAL MEDICINE | Facility: CLINIC | Age: 32
End: 2022-06-20
Payer: MEDICAID

## 2022-06-20 ENCOUNTER — APPOINTMENT (OUTPATIENT)
Dept: CT IMAGING | Facility: CLINIC | Age: 32
End: 2022-06-20
Attending: EMERGENCY MEDICINE
Payer: MEDICAID

## 2022-06-20 VITALS
TEMPERATURE: 97.9 F | RESPIRATION RATE: 13 BRPM | SYSTOLIC BLOOD PRESSURE: 132 MMHG | HEIGHT: 77 IN | WEIGHT: 194 LBS | OXYGEN SATURATION: 97 % | HEART RATE: 82 BPM | BODY MASS INDEX: 22.91 KG/M2 | DIASTOLIC BLOOD PRESSURE: 74 MMHG

## 2022-06-20 LAB
ALBUMIN SERPL-MCNC: 4.5 G/DL (ref 3.4–5)
ALBUMIN UR-MCNC: 50 MG/DL
ALP SERPL-CCNC: 86 U/L (ref 40–150)
ALT SERPL W P-5'-P-CCNC: 53 U/L (ref 0–70)
APPEARANCE UR: CLEAR
AST SERPL W P-5'-P-CCNC: 27 U/L (ref 0–45)
BILIRUB DIRECT SERPL-MCNC: <0.1 MG/DL (ref 0–0.2)
BILIRUB SERPL-MCNC: 0.4 MG/DL (ref 0.2–1.3)
BILIRUB UR QL STRIP: NEGATIVE
COLOR UR AUTO: YELLOW
FLUAV RNA SPEC QL NAA+PROBE: NEGATIVE
FLUBV RNA RESP QL NAA+PROBE: NEGATIVE
GLUCOSE UR STRIP-MCNC: NEGATIVE MG/DL
HGB UR QL STRIP: NEGATIVE
KETONES UR STRIP-MCNC: NEGATIVE MG/DL
LEUKOCYTE ESTERASE UR QL STRIP: NEGATIVE
NITRATE UR QL: NEGATIVE
PH UR STRIP: 8 [PH] (ref 5–7)
PROT SERPL-MCNC: 7.7 G/DL (ref 6.8–8.8)
RBC URINE: 2 /HPF
RSV RNA SPEC NAA+PROBE: NEGATIVE
SARS-COV-2 RNA RESP QL NAA+PROBE: NEGATIVE
SP GR UR STRIP: 1 (ref 1–1.03)
UROBILINOGEN UR STRIP-MCNC: NORMAL MG/DL
WBC URINE: 0 /HPF

## 2022-06-20 PROCEDURE — 250N000009 HC RX 250: Performed by: EMERGENCY MEDICINE

## 2022-06-20 PROCEDURE — 250N000011 HC RX IP 250 OP 636: Performed by: EMERGENCY MEDICINE

## 2022-06-20 PROCEDURE — 258N000003 HC RX IP 258 OP 636: Performed by: EMERGENCY MEDICINE

## 2022-06-20 PROCEDURE — 74177 CT ABD & PELVIS W/CONTRAST: CPT

## 2022-06-20 PROCEDURE — 81001 URINALYSIS AUTO W/SCOPE: CPT | Performed by: EMERGENCY MEDICINE

## 2022-06-20 RX ORDER — METOCLOPRAMIDE 10 MG/1
10 TABLET ORAL 3 TIMES DAILY PRN
Qty: 20 TABLET | Refills: 0 | Status: SHIPPED | OUTPATIENT
Start: 2022-06-20 | End: 2022-06-22

## 2022-06-20 RX ORDER — IOPAMIDOL 755 MG/ML
98 INJECTION, SOLUTION INTRAVASCULAR ONCE
Status: COMPLETED | OUTPATIENT
Start: 2022-06-20 | End: 2022-06-20

## 2022-06-20 RX ORDER — METOCLOPRAMIDE HYDROCHLORIDE 5 MG/ML
10 INJECTION INTRAMUSCULAR; INTRAVENOUS ONCE
Status: COMPLETED | OUTPATIENT
Start: 2022-06-20 | End: 2022-06-20

## 2022-06-20 RX ORDER — ONDANSETRON 4 MG/1
4 TABLET, ORALLY DISINTEGRATING ORAL EVERY 8 HOURS PRN
Qty: 15 TABLET | Refills: 0 | Status: SHIPPED | OUTPATIENT
Start: 2022-06-20 | End: 2022-07-27

## 2022-06-20 RX ADMIN — ONDANSETRON 4 MG: 2 INJECTION INTRAMUSCULAR; INTRAVENOUS at 00:15

## 2022-06-20 RX ADMIN — METOCLOPRAMIDE 10 MG: 5 INJECTION, SOLUTION INTRAMUSCULAR; INTRAVENOUS at 02:27

## 2022-06-20 RX ADMIN — IOPAMIDOL 98 ML: 755 INJECTION, SOLUTION INTRAVENOUS at 00:33

## 2022-06-20 RX ADMIN — SODIUM CHLORIDE 68 ML: 9 INJECTION, SOLUTION INTRAVENOUS at 00:34

## 2022-06-20 RX ADMIN — SODIUM CHLORIDE 1000 ML: 9 INJECTION, SOLUTION INTRAVENOUS at 00:15

## 2022-06-20 RX ADMIN — KETOROLAC TROMETHAMINE 15 MG: 15 INJECTION, SOLUTION INTRAMUSCULAR; INTRAVENOUS at 00:15

## 2022-06-20 NOTE — TELEPHONE ENCOUNTER
What type of discharge? Emergency Department  Risk of Hospital admission or ED visit: 69%  Is a TCM episode required? No  When should the patient follow up with PCP? within 30 days of discharge.    Montserrat Forde RN  [facility]

## 2022-06-20 NOTE — ED NOTES
Pt ready for discharge. Pt is alert and oriented at baseline able to ambulate without difficulty. No other complaints at this time. Reviewed discharge instructions with patient and guardian. all questions answered. Pt and guardian agreeable with plan.

## 2022-06-20 NOTE — ED TRIAGE NOTES
Mother states pt has Hx of cyclical vomiting recently Dx as gastroperesis. Have been trying to do smaller meals but pt has not been following plan. Today after dinner pt c/o abd pain and had 1 emesis in route. Large amount per Mother's report. Follows up with [facility] recently.     Triage Assessment     Row Name 06/19/22 2017       Triage Assessment (Adult)    Airway WDL WDL       Respiratory WDL    Respiratory WDL WDL       Skin Circulation/Temperature WDL    Skin Circulation/Temperature WDL WDL       Cardiac WDL    Cardiac WDL WDL       Peripheral/Neurovascular WDL    Peripheral Neurovascular WDL WDL       Cognitive/Neuro/Behavioral WDL    Cognitive/Neuro/Behavioral WDL WDL

## 2022-06-20 NOTE — ED PROVIDER NOTES
History   Chief Complaint:  Abdominal pain, Nausea and Vomiting.      The history is provided by the patient and a parent.      Ravi Dunne is a 31 year old male with history of autism, gastroparesis and cyclic vomiting syndrome and hemorrhoids who presents with abdominal pain, nausea and vomiting. Patient's mother reports he was diagnosed with cyclic vomiting syndrome at age 3, and last February the  diagnosis was reportedly changed to gastroparesis. Patient started experiencing these symptoms after dinner this evening. Confirms dysuria and dry-heaving as well as increased pain in his abdomen while laying down. Denies diarrhea, headache, sore throat, back pain, cough, history of surgeries or any changes in medication. Patient has not taken any medications for his symptoms this evening and threw up 3 times en route to the ED. No fever at home. No trauma/injuries.     Review of Systems   HENT: Negative for sore throat.    Respiratory: Negative for cough.    Gastrointestinal: Positive for abdominal pain, nausea and vomiting. Negative for diarrhea.   Genitourinary: Positive for dysuria.   Musculoskeletal: Negative for back pain.   Neurological: Negative for headaches.   All other systems reviewed and are negative.    Allergies:  Sulfa Drugs  Oat Bran    Medications:  benzonatate (TESSALON) 200 MG capsule  cetirizine (ZYRTEC) 10 MG tablet  escitalopram (LEXAPRO) 20 MG tablet  hydrOXYzine (ATARAX) 25 MG tablet  levothyroxine (SYNTHROID/LEVOTHROID) 125 MCG tablet  LORazepam (ATIVAN) 0.5 MG tablet  metoclopramide (REGLAN) 5 MG tablet  omeprazole (PRILOSEC) 20 MG DR capsule  ondansetron (ZOFRAN-ODT) 4 MG ODT tab  OXcarbazepine (TRILEPTAL) 300 MG tablet  OXcarbazepine (TRILEPTAL) 600 MG tablet  QUEtiapine (SEROQUEL) 25 MG tablet  QUEtiapine (SEROQUEL) 50 MG tablet  simethicone (MYLICON) 125 MG chewable tablet  SUMAtriptan (IMITREX) 20 MG/ACT nasal spray    Past Medical History:     Autism   Anxiety  Hyperlipidemia  "  Cyclic vomiting syndrome   Episodic mood disorder    Hemorrhoids   GERD    Family History:    Father - Alcoholism, Cancer   Brother - Autism     Social History:  The patient presents to the ED with his mother.     Physical Exam     Patient Vitals for the past 24 hrs:   BP Temp Temp src Pulse Resp SpO2 Height Weight   06/20/22 0400 132/74 -- -- 82 13 -- -- --   06/19/22 2021 125/82 97.9  F (36.6  C) Oral 78 18 97 % 1.956 m (6' 5\") 88 kg (194 lb)   06/19/22 2017 (!) 172/93 (!) 101.1  F (38.4  C) Temporal 111 14 100 % 1.803 m (5' 11\") 79.4 kg (175 lb)       Physical Exam  General: Nontoxic. Appears uncomfortable.   Head:  Scalp, face, and head appear normal  Eyes:  Pupils are equal, round, reactive to light    Conjunctivae non-injected and sclerae white  ENT:    The external nose is normal    Pinnae are normal  Neck:  Normal range of motion    There is no rigidity noted    Trachea is in the midline  CV:  Regular rate and rhythm     Normal S1/S2, no S3/S4    No murmur or rub. Radial pulses 2+ bilaterally.  Resp:  Lungs are clear and equal bilaterally  There is no tachypnea    No increased work of breathing    No rales, wheezing, or rhonchi  GI:  Abdomen is soft, no rigidity or guarding    No distension, or mass    + RLQ and suprapubic tenderness. No rebound tenderness   MS:  Normal muscular tone    Symmetric motor strength    No lower extremity edema  Skin:  No rash or acute skin lesions noted  Neuro: Awake and alert  Speech is normal and fluent  Moves all extremities spontaneously  Psych:  Normal affect. Appropriate interactions.      Emergency Department Course     Imaging:  CT Abdomen Pelvis w Contrast   Final Result   IMPRESSION:    1.  Normal appendix.   2.  Gastric distention.    3.  No findings to account for the patient's right lower quadrant pain.         Report per radiology    Laboratory:  Labs Ordered and Resulted from Time of ED Arrival to Time of ED Departure   BASIC METABOLIC PANEL - Abnormal       " Result Value    Sodium 133      Potassium 3.6      Chloride 99      Carbon Dioxide (CO2) 30      Anion Gap 4      Urea Nitrogen 13      Creatinine 0.99      Calcium 9.4      Glucose 108 (*)     GFR Estimate >90     CBC WITH PLATELETS AND DIFFERENTIAL - Abnormal    WBC Count 11.3 (*)     RBC Count 5.17      Hemoglobin 15.1      Hematocrit 43.7      MCV 85      MCH 29.2      MCHC 34.6      RDW 11.8      Platelet Count 182      % Neutrophils 80      % Lymphocytes 15      % Monocytes 4      % Eosinophils 1      % Basophils 0      % Immature Granulocytes 0      NRBCs per 100 WBC 0      Absolute Neutrophils 9.0 (*)     Absolute Lymphocytes 1.7      Absolute Monocytes 0.5      Absolute Eosinophils 0.1      Absolute Basophils 0.0      Absolute Immature Granulocytes 0.0      Absolute NRBCs 0.0     ROUTINE UA WITH MICROSCOPIC REFLEX TO CULTURE - Abnormal    Color Urine Yellow      Appearance Urine Clear      Glucose Urine Negative      Bilirubin Urine Negative      Ketones Urine Negative      Specific Gravity Urine 1.005      Blood Urine Negative      pH Urine 8.0 (*)     Protein Albumin Urine 50  (*)     Urobilinogen Urine Normal      Nitrite Urine Negative      Leukocyte Esterase Urine Negative      RBC Urine 2      WBC Urine 0     LIPASE - Normal    Lipase 180     INFLUENZA A/B & SARS-COV2 PCR MULTIPLEX - Normal    Influenza A PCR Negative      Influenza B PCR Negative      RSV PCR Negative      SARS CoV2 PCR Negative     HEPATIC FUNCTION PANEL - Normal    Bilirubin Total 0.4      Bilirubin Direct <0.1      Protein Total 7.7      Albumin 4.5      Alkaline Phosphatase 86      AST 27      ALT 53        Emergency Department Course:         Reviewed:  I reviewed nursing notes, vitals, past medical history and Care Everywhere    Assessments:  2333 I obtained history and examined the patient as noted above.   0330 I rechecked the patient and explained findings.     Interventions:  0015 NS 1000 mL, IV  0015 Zofran 4 mg,  IV  0015 Toradol 15 mg, IV  0034 Saline 8 mL, IV  0227 Reglan 10 mg, IV    Disposition:  The patient was discharged to home.     Impression & Plan     Medical Decision Making:  Ravi Dunne is a 31 year old male Struve gastroparesis who presents with vomiting and abdominal pain.  On my evaluation he is well-appearing, hemodynamically stable and afebrile.  Abdominal exam is benign without evidence of peritonitis or acute surgical emergency.  He does have some mild lower abdominal tenderness.  Initial triage vitals showed a temporal temperature of 101 however oral temperature 4 minutes later showed a normal temperature of 97.9.  The temporal reading is felt to be spurious.  Patient has had no fever at home does not have a tactile fever on exam.  A broad differential diagnosis is considered.  Work-up in the emergency department is ultimately consistent with flare of his underlying gastroparesis.  CT scan of the abdomen and pelvis was obtained there was no evidence of appendicitis cholecystitis, or other acute intra-abdominal process.  There is significant gastric distention consistent with known history of gastroparesis.  Patient was treated with IV fluids, Zofran and Reglan with significant improvement in his symptoms.  COVID-19 and influenza PCR negative.  CBC, CMP, lipase are reassuring.  Urinalysis negative for infection.  Patient significantly improved and had no recurrence of his symptoms.  I recommended supportive care at home with Zofran, Reglan and close follow-up with his primary care physician and/or gastroenterologist.  The patient and the patient's mother were agreeable with the plan of care.  Close return precautions were provided and he was discharged in stable and improved condition.    Diagnosis:    ICD-10-CM    1. Non-intractable vomiting with nausea, unspecified vomiting type  R11.2    2. Gastroparesis  K31.84        Discharge Medications:  Discharge Medication List as of 6/20/2022  4:12 AM       START taking these medications    Details   !! ondansetron (ZOFRAN ODT) 4 MG ODT tab Take 1 tablet (4 mg) by mouth every 8 hours as needed for nausea or vomiting, Disp-15 tablet, R-0, Local PrintMay substitute non-ODT formulation per patient preference/insurance coverage.       !! - Potential duplicate medications found. Please discuss with provider.          Scribe Disclosure:  I, SURENDRA FALCON, am serving as a scribe at 11:33 PM on 6/19/2022 to document services personally performed by Sukhdeep Venegas MD based on my observations and the provider's statements to me.          Sukhdeep Venegas MD  06/20/22 1883

## 2022-06-21 NOTE — TELEPHONE ENCOUNTER
"    Hospital/TCU/ED for chronic condition Discharge Protocol    \"Hi, my name is Kaitlynn Mota RN, a registered nurse, and I am calling from [facility].  I am calling to follow up and see how things are going for you after your recent emergency visit/hospital/TCU stay.\"    Tell me how you are doing now that you are home?\" His stomach still bothers him, so I made an appointment for tomorrow to see his PCP. He still has anxiety and other issues.      Discharge Instructions    \"Let's review your discharge instructions.  What is/are the follow-up recommendations?  Pt. Response: Follow up with PCP and F/U with [facility]. [facility] is months out for scheduling. [facility] scheduled for [facility] in September.    \"Has an appointment with your primary care provider been scheduled?\"   Yes. (confirm) 6/22/22    \"When you see the provider, I would recommend that you bring your medications with you.\"    Medications    \"Tell me what changed about your medicines when you discharged?\"    Changes to chronic meds?    0-1    \"What questions do you have about your medications?\"    None     New diagnoses of heart failure, COPD, diabetes, or MI?    No              Post Discharge Medication Reconciliation Status: discharge medications reconciled, continue medications without change.    Was [facility] referral placed (*Make sure to put transitions as reason for referral)?   No    Call Summary    \"What questions or concerns do you have about your recent visit and your follow-up care?\"     Patient is only able to eat small amounts of food, mother is concerned patient is not getting adequate intake to maintain current weight status.     \"If you have questions or things don't continue to improve, we encourage you contact us through the main clinic number (give number).  Even if the clinic is not open, triage nurses are available 24/7 to help you.     We would like you to know that our clinic has extended hours (provide information).  We also have " "urgent care (provide details on closest location and hours/contact info)\"      \"Thank you for your time and take care!\"             "

## 2022-06-22 ENCOUNTER — OFFICE VISIT (OUTPATIENT)
Dept: INTERNAL MEDICINE | Facility: CLINIC | Age: 32
End: 2022-06-22
Payer: MEDICAID

## 2022-06-22 VITALS
DIASTOLIC BLOOD PRESSURE: 76 MMHG | WEIGHT: 188.3 LBS | OXYGEN SATURATION: 97 % | BODY MASS INDEX: 22.33 KG/M2 | SYSTOLIC BLOOD PRESSURE: 144 MMHG | TEMPERATURE: 98.3 F | HEART RATE: 97 BPM

## 2022-06-22 DIAGNOSIS — R11.15 CYCLIC VOMITING SYNDROME: ICD-10-CM

## 2022-06-22 DIAGNOSIS — Z23 HIGH PRIORITY FOR 2019-NCOV VACCINE: ICD-10-CM

## 2022-06-22 DIAGNOSIS — K31.84 GASTROPARESIS: Primary | ICD-10-CM

## 2022-06-22 DIAGNOSIS — F41.9 ANXIETY: ICD-10-CM

## 2022-06-22 DIAGNOSIS — Z11.59 NEED FOR HEPATITIS C SCREENING TEST: ICD-10-CM

## 2022-06-22 DIAGNOSIS — K30 DELAYED GASTRIC EMPTYING: ICD-10-CM

## 2022-06-22 PROCEDURE — 91306 COVID-19,PF,MODERNA (18+ YRS BOOSTER .25ML): CPT | Performed by: INTERNAL MEDICINE

## 2022-06-22 PROCEDURE — 99214 OFFICE O/P EST MOD 30 MIN: CPT | Mod: 25 | Performed by: INTERNAL MEDICINE

## 2022-06-22 PROCEDURE — 0064A COVID-19,PF,MODERNA (18+ YRS BOOSTER .25ML): CPT | Performed by: INTERNAL MEDICINE

## 2022-06-22 RX ORDER — METOCLOPRAMIDE 10 MG/1
TABLET ORAL
Qty: 90 TABLET | Refills: 2 | Status: SHIPPED | OUTPATIENT
Start: 2022-06-22 | End: 2024-08-05

## 2022-06-22 RX ORDER — LORAZEPAM 0.5 MG/1
0.5 TABLET ORAL 2 TIMES DAILY PRN
Qty: 15 TABLET | Refills: 0 | Status: SHIPPED | OUTPATIENT
Start: 2022-06-22

## 2022-06-22 ASSESSMENT — PATIENT HEALTH QUESTIONNAIRE - PHQ9
SUM OF ALL RESPONSES TO PHQ QUESTIONS 1-9: 11
10. IF YOU CHECKED OFF ANY PROBLEMS, HOW DIFFICULT HAVE THESE PROBLEMS MADE IT FOR YOU TO DO YOUR WORK, TAKE CARE OF THINGS AT HOME, OR GET ALONG WITH OTHER PEOPLE: VERY DIFFICULT
SUM OF ALL RESPONSES TO PHQ QUESTIONS 1-9: 11

## 2022-06-22 NOTE — PROGRESS NOTES
Assessment & Plan   Problem List Items Addressed This Visit     Anxiety    Relevant Medications    LORazepam (ATIVAN) 0.5 MG tablet    Cyclic vomiting syndrome    Relevant Medications    LORazepam (ATIVAN) 0.5 MG tablet    Delayed gastric emptying    Relevant Medications    metoclopramide (REGLAN) 10 MG tablet    Other Relevant Orders    REVIEW OF HEALTH MAINTENANCE PROTOCOL ORDERS (Completed)    Gastroparesis - Primary    Relevant Medications    metoclopramide (REGLAN) 10 MG tablet    Other Relevant Orders    REVIEW OF HEALTH MAINTENANCE PROTOCOL ORDERS (Completed)      Other Visit Diagnoses     Need for hepatitis C screening test        Relevant Orders    REVIEW OF HEALTH MAINTENANCE PROTOCOL ORDERS (Completed)    High priority for 2019-nCoV vaccine        Relevant Orders    REVIEW OF HEALTH MAINTENANCE PROTOCOL ORDERS (Completed)    COVID-19,PF,MODERNA (18+ Yrs BOOSTER .25mL) (Completed)              Gastroparesis (delayed stomach emptying).        Generally try to eat smaller meals, eat slowly.        Trial of Metoclopramide (generic Reglan) to help reduce the vomiting.      --take Metoclopramide 1/2 or 1 tablet twice per day, once or twice per day as needed for nausea/vomiting or before meals.         Continue all other medications at the same doses.  Contact your usual pharmacy if you need refills.        OK to use Lorazepam 0.5 mg once per day as needed for acute anxiety or during acute episodes of vomiting.        Return to see me in 6 months, sooner if needed.  Use [employer] or Call [phone] to schedule the appointment with me.            Depression Screening Follow Up    PHQ 6/22/2022   PHQ-9 Total Score 11   Q9: Thoughts of better off dead/self-harm past 2 weeks Not at all         Follow Up Actions Taken           Return in about 3 months (around 9/22/2022) for Routine Visit.    Bhaskar Gandara MD  [facility] Rodrigues is a 31 year old accompanied  by his mother., presenting for the following health issues:  Imm/Inj (COVID-19 VACCINE) and ER F/U      HPI     ED/UC Followup:    Facility:  [facility]  Date of visit: 06/19/2022  Reason for visit: Vomiting  Current Status: improved.     Has regular episodes of intense suden vomiting.     Frequent ER visits.     multiple visits with GI    Gastric emptying showed delayed emptying, given diagnosis of gastroparesis, but no specific treatments offered.     2.  History of hypothyroidism.  On replacement therapy.  He has not experienced any significant side effects of this medication.   The patient denies of fatigue, weight changes, heat/cold intolerance, hair changes, nail changes, bowel changes.     Latest labs reviewed:    Lab Results   Component Value Date    TSH 3.21 11/06/2021    TSH 0.45 07/06/2020    TSH 0.38 04/03/2019    TSH 0.80 05/02/2018    TSH 1.98 11/15/2017    TSH 8.48 08/11/2017    TSH 7.47 02/14/2017    TSH 1.17 06/24/2014    TSH 2.80 02/02/2010    TSH 2.52 04/23/2009          3.  Severe autisim and delayed development.   Followed y [facility] psychaitrist.       **I reviewed the information recorded in the patient's EPIC chart (including but not limited to medical history, surgical history, family history, problem list, medication list, and allergy list) and updated the information as indicated based on the patients reported information.             Review of Systems   Constitutional, HEENT, cardiovascular, pulmonary, gi and gu systems are negative, except as otherwise noted.      Objective    BP (!) 144/76   Pulse 97   Temp 98.3  F (36.8  C) (Tympanic)   Wt 85.4 kg (188 lb 4.8 oz)   SpO2 97%   BMI 22.33 kg/m    Body mass index is 22.33 kg/m .  Physical Exam   GENERAL alert and no distress  EYES:  Normal sclera,conjunctiva, EOMI  HENT: oral and posterior pharynx without lesions or erythema, facies symmetric  NECK: Neck supple. No LAD, without thyroidmegaly.  RESP: Clear to ausculation  bilaterally without wheezes or crackles. Normal BS in all fields.  CV: RRR normal S1S2 without murmurs, rubs or gallops.  LYMPH: no cervical lymph adenopathy appreciated  MS: extremities- no gross deformities of the visible extremities noted,   EXT:  no lower extremity edema  PSYCH: Alert and oriented times 3; speech- coherent  SKIN:  No obvious significant skin lesions on visible portions of face                     .  ..  Answers for HPI/ROS submitted by the patient on 6/22/2022  If you checked off any problems, how difficult have these problems made it for you to do your work, take care of things at home, or get along with other people?: Very difficult  PHQ9 TOTAL SCORE: 11

## 2022-06-22 NOTE — PATIENT INSTRUCTIONS
Gastroparesis (delayed stomach emptying).      Generally try to eat smaller meals, eat slowly.      Trial of Metoclopramide (generic Reglan) to help reduce the vomiting.      --take Metoclopramide 1/2 or 1 tablet twice per day, once or twice per day as needed for nausea/vomiting or before meals.       Continue all other medications at the same doses.  Contact your usual pharmacy if you need refills.      OK to use Lorazepam 0.5 mg once per day as needed for acute anxiety or during acute episodes of vomiting.      Return to see me in 6 months, sooner if needed.  Use [employer] or Call [phone] to schedule the appointment with me.

## 2022-07-10 DIAGNOSIS — R11.15 INTRACTABLE CYCLICAL VOMITING WITH NAUSEA: ICD-10-CM

## 2022-07-27 ENCOUNTER — HOSPITAL ENCOUNTER (EMERGENCY)
Facility: CLINIC | Age: 32
Discharge: HOME | End: 2022-07-27
Attending: EMERGENCY MEDICINE | Admitting: EMERGENCY MEDICINE
Payer: MEDICAID

## 2022-07-27 ENCOUNTER — APPOINTMENT (OUTPATIENT)
Dept: GENERAL RADIOLOGY | Facility: CLINIC | Age: 32
End: 2022-07-27
Attending: EMERGENCY MEDICINE
Payer: MEDICAID

## 2022-07-27 VITALS
TEMPERATURE: 97.8 F | DIASTOLIC BLOOD PRESSURE: 96 MMHG | RESPIRATION RATE: 20 BRPM | BODY MASS INDEX: 22.89 KG/M2 | WEIGHT: 188 LBS | HEART RATE: 71 BPM | SYSTOLIC BLOOD PRESSURE: 136 MMHG | OXYGEN SATURATION: 97 % | HEIGHT: 76 IN

## 2022-07-27 DIAGNOSIS — R11.2 NAUSEA AND VOMITING, INTRACTABILITY OF VOMITING NOT SPECIFIED, UNSPECIFIED VOMITING TYPE: ICD-10-CM

## 2022-07-27 LAB
ALBUMIN SERPL-MCNC: 4.5 G/DL (ref 3.4–5)
ALP SERPL-CCNC: 71 U/L (ref 40–150)
ALT SERPL W P-5'-P-CCNC: 32 U/L (ref 0–70)
ANION GAP SERPL CALCULATED.3IONS-SCNC: 7 MMOL/L (ref 3–14)
AST SERPL W P-5'-P-CCNC: 20 U/L (ref 0–45)
BASOPHILS # BLD AUTO: 0 10E3/UL (ref 0–0.2)
BASOPHILS NFR BLD AUTO: 0 %
BILIRUB SERPL-MCNC: 0.5 MG/DL (ref 0.2–1.3)
BUN SERPL-MCNC: 9 MG/DL (ref 7–30)
CALCIUM SERPL-MCNC: 9.2 MG/DL (ref 8.5–10.1)
CHLORIDE BLD-SCNC: 100 MMOL/L (ref 94–109)
CO2 SERPL-SCNC: 26 MMOL/L (ref 20–32)
CREAT SERPL-MCNC: 0.84 MG/DL (ref 0.66–1.25)
EOSINOPHIL # BLD AUTO: 0.1 10E3/UL (ref 0–0.7)
EOSINOPHIL NFR BLD AUTO: 2 %
ERYTHROCYTE [DISTWIDTH] IN BLOOD BY AUTOMATED COUNT: 11.8 % (ref 10–15)
GFR SERPL CREATININE-BSD FRML MDRD: >90 ML/MIN/1.73M2
GLUCOSE BLD-MCNC: 117 MG/DL (ref 70–99)
HCT VFR BLD AUTO: 40 % (ref 40–53)
HGB BLD-MCNC: 14.5 G/DL (ref 13.3–17.7)
HOLD SPECIMEN: NORMAL
IMM GRANULOCYTES # BLD: 0 10E3/UL
IMM GRANULOCYTES NFR BLD: 0 %
LYMPHOCYTES # BLD AUTO: 1.6 10E3/UL (ref 0.8–5.3)
LYMPHOCYTES NFR BLD AUTO: 28 %
MCH RBC QN AUTO: 29.2 PG (ref 26.5–33)
MCHC RBC AUTO-ENTMCNC: 36.3 G/DL (ref 31.5–36.5)
MCV RBC AUTO: 81 FL (ref 78–100)
MONOCYTES # BLD AUTO: 0.2 10E3/UL (ref 0–1.3)
MONOCYTES NFR BLD AUTO: 4 %
NEUTROPHILS # BLD AUTO: 3.8 10E3/UL (ref 1.6–8.3)
NEUTROPHILS NFR BLD AUTO: 66 %
NRBC # BLD AUTO: 0 10E3/UL
NRBC BLD AUTO-RTO: 0 /100
PLATELET # BLD AUTO: 174 10E3/UL (ref 150–450)
POTASSIUM BLD-SCNC: 3.8 MMOL/L (ref 3.4–5.3)
PROT SERPL-MCNC: 7.5 G/DL (ref 6.8–8.8)
RBC # BLD AUTO: 4.96 10E6/UL (ref 4.4–5.9)
SODIUM SERPL-SCNC: 133 MMOL/L (ref 133–144)
WBC # BLD AUTO: 5.8 10E3/UL (ref 4–11)

## 2022-07-27 PROCEDURE — 96372 THER/PROPH/DIAG INJ SC/IM: CPT | Performed by: PHYSICIAN ASSISTANT

## 2022-07-27 PROCEDURE — 80053 COMPREHEN METABOLIC PANEL: CPT | Performed by: EMERGENCY MEDICINE

## 2022-07-27 PROCEDURE — 74019 RADEX ABDOMEN 2 VIEWS: CPT

## 2022-07-27 PROCEDURE — 250N000011 HC RX IP 250 OP 636: Performed by: PHYSICIAN ASSISTANT

## 2022-07-27 PROCEDURE — 258N000003 HC RX IP 258 OP 636: Performed by: PHYSICIAN ASSISTANT

## 2022-07-27 PROCEDURE — 36415 COLL VENOUS BLD VENIPUNCTURE: CPT | Performed by: EMERGENCY MEDICINE

## 2022-07-27 PROCEDURE — 96361 HYDRATE IV INFUSION ADD-ON: CPT

## 2022-07-27 PROCEDURE — 96374 THER/PROPH/DIAG INJ IV PUSH: CPT

## 2022-07-27 PROCEDURE — 85018 HEMOGLOBIN: CPT | Performed by: EMERGENCY MEDICINE

## 2022-07-27 PROCEDURE — 99284 EMERGENCY DEPT VISIT MOD MDM: CPT | Mod: 25

## 2022-07-27 RX ORDER — ONDANSETRON 4 MG/1
4 TABLET, ORALLY DISINTEGRATING ORAL EVERY 8 HOURS PRN
Qty: 10 TABLET | Refills: 0 | Status: SHIPPED | OUTPATIENT
Start: 2022-07-27 | End: 2022-07-30

## 2022-07-27 RX ORDER — ONDANSETRON 2 MG/ML
4 INJECTION INTRAMUSCULAR; INTRAVENOUS ONCE
Status: COMPLETED | OUTPATIENT
Start: 2022-07-27 | End: 2022-07-27

## 2022-07-27 RX ORDER — OLANZAPINE 10 MG/2ML
10 INJECTION, POWDER, FOR SOLUTION INTRAMUSCULAR ONCE
Status: COMPLETED | OUTPATIENT
Start: 2022-07-27 | End: 2022-07-27

## 2022-07-27 RX ORDER — SODIUM CHLORIDE 9 MG/ML
INJECTION, SOLUTION INTRAVENOUS CONTINUOUS
Status: DISCONTINUED | OUTPATIENT
Start: 2022-07-27 | End: 2022-07-27 | Stop reason: HOSPADM

## 2022-07-27 RX ORDER — WATER 10 ML/10ML
INJECTION INTRAMUSCULAR; INTRAVENOUS; SUBCUTANEOUS
Status: DISCONTINUED
Start: 2022-07-27 | End: 2022-07-27 | Stop reason: HOSPADM

## 2022-07-27 RX ADMIN — OLANZAPINE 10 MG: 10 INJECTION, POWDER, LYOPHILIZED, FOR SOLUTION INTRAMUSCULAR at 15:52

## 2022-07-27 RX ADMIN — ONDANSETRON 4 MG: 2 INJECTION INTRAMUSCULAR; INTRAVENOUS at 15:54

## 2022-07-27 RX ADMIN — SODIUM CHLORIDE 1000 ML: 9 INJECTION, SOLUTION INTRAVENOUS at 15:52

## 2022-07-27 ASSESSMENT — ENCOUNTER SYMPTOMS
COUGH: 0
NAUSEA: 1
SHORTNESS OF BREATH: 0
ABDOMINAL PAIN: 1
VOMITING: 0
DIARRHEA: 0
RHINORRHEA: 0

## 2022-07-27 NOTE — ED PROVIDER NOTES
Rapid Assessment Note    History:   Ravi Dunne is a 31 year old male who presents with abdominal pain and nausea. Per the patient's mother, he was recently diagnosed with gastroenteritis which they are having trouble managing at home. His management is complicated by his autism. She reports today he has complained of nausea, abdominal pain, constipation and pain when he attempts a bowel movement. He has an appointment with his GI specialist Dr. Byers in September.     Exam:   General:  Alert, interactive  Cardiovascular:  Well perfused  Lungs:  No respiratory distress, no accessory muscle use  Neuro:  Moving all 4 extremities  Skin:  Warm, dry  Psych:  Normal affect  ***    Plan of Care:   I evaluated the patient and developed an initial plan of care. I discussed this plan and explained that I, or one of my partners, would be returning to complete the evaluation.     I, Yaron Goodwin, am serving as a scribe to document services personally performed by Mehran Botello MD ***, based on my observations and the provider's statements to me.    07/27/2022  [employer]    Portions of this medical record were completed by a scribe. UPON MY REVIEW AND AUTHENTICATION BY ELECTRONIC SIGNATURE, this confirms (a) I performed the applicable clinical services, and (b) the record is accurate.

## 2022-07-27 NOTE — ED PROVIDER NOTES
"  History   Chief Complaint:  Nausea and Abdominal Pain       The history is provided by the patient and a parent.      Ravi Dunne is a 31 year old male with history of autism, gastroparesis, hyperlipidemia who presents with nausea and abdominal pain. The patient has a history of gastroparesis and the mother has been having trouble managing it lately although they have an appointment with the GI doctor in september. The mother states that they were in here before on June 20th and she is trying to get his portions under control to deter these episodes. The patient states that he is currently feeling nauseated but has not vomited today. The patient denies any diarrhea, vomiting today, chest pain, shortness of breath, cough or rhinorrhea.       Review of Systems   HENT: Negative for rhinorrhea.    Respiratory: Negative for cough and shortness of breath.    Cardiovascular: Negative for chest pain.   Gastrointestinal: Positive for abdominal pain and nausea. Negative for diarrhea and vomiting.   All other systems reviewed and are negative.    Allergies:  Sulfa Drugs    Medications:  Tessalon   Zyrtec   Lexapro     Synthroid   Ativan   Reglan   Zofran   Trileptal   Seroquel   Mylicon   Imitrex   Atarax     Past Medical History:     Autism   Developmental delay  Anxiety   Hyperlipidemia   Cyclic vomiting syndrome   Autoimmune hypothyroidism   Nausea and vomiting   Episodic mood disorder  Delayed gastric emptying  Gastroparesis      Past Surgical History:    The mother denies any past surgical history     Family History:    Alcoholism   Cancer   Autism     Social History:  Patient came from home.  Patient is accompanied in the ED with mother.    Physical Exam     Patient Vitals for the past 24 hrs:   BP Temp Pulse Resp SpO2 Height Weight   07/27/22 1426 (!) 136/96 97.8  F (36.6  C) 71 20 97 % 1.93 m (6' 4\") 85.3 kg (188 lb)       Physical Exam  General: Well appearing, pleasant male, resting on exam bed  HEENT: No " evidence of trauma.  Conjunctive are clear.  Extraocular eye movements intact.  Neck range of motion intact.  Nose and throat clear.  Respiratory: Good effort  Cardiovascular: Good perfusion  Gastrointestinal: Soft, nontender.   Musculoskeletal: Atraumatic  Skin: Exposed skin clear.  Neurologic: Alert.  Psych:  Patient is cooperative, with normal affect.      Emergency Department Course     Imaging:  XR Abdomen 2 Views   Final Result   IMPRESSION: No evidence of bowel obstruction or pneumoperitoneum.   Small volume formed stool throughout the colon. Distended,   fluid-filled stomach, unchanged from prior CT and consistent with   known gastroparesis. Lung bases are clear. No acute bony abnormality.      HÉCTOR RAGSDALE MD            SYSTEM ID:  [id number]        Report per radiology    Laboratory:  Labs Ordered and Resulted from Time of ED Arrival to Time of ED Departure   COMPREHENSIVE METABOLIC PANEL - Abnormal       Result Value    Sodium 133      Potassium 3.8      Chloride 100      Carbon Dioxide (CO2) 26      Anion Gap 7      Urea Nitrogen 9      Creatinine 0.84      Calcium 9.2      Glucose 117 (*)     Alkaline Phosphatase 71      AST 20      ALT 32      Protein Total 7.5      Albumin 4.5      Bilirubin Total 0.5      GFR Estimate >90     CBC WITH PLATELETS AND DIFFERENTIAL    WBC Count 5.8      RBC Count 4.96      Hemoglobin 14.5      Hematocrit 40.0      MCV 81      MCH 29.2      MCHC 36.3      RDW 11.8      Platelet Count 174      % Neutrophils 66      % Lymphocytes 28      % Monocytes 4      % Eosinophils 2      % Basophils 0      % Immature Granulocytes 0      NRBCs per 100 WBC 0      Absolute Neutrophils 3.8      Absolute Lymphocytes 1.6      Absolute Monocytes 0.2      Absolute Eosinophils 0.1      Absolute Basophils 0.0      Absolute Immature Granulocytes 0.0      Absolute NRBCs 0.0          Emergency Department Course       Reviewed:  I reviewed nursing notes, vitals, past medical history and Care  Everywhere    Assessments:  1536 I obtained history and examined the patient as noted above.   1645 I rechecked the patient and explained findings.     Interventions:  1552 NS 1000 mL IV  1552 Zyprexa 10 mg IM  1554 Zofran 4 mg IV    Disposition:  The patient was discharged to home.     Impression & Plan     Medical Decision Making:  Ravi Dunne is a 31 year old male with a history of autism, presents emergency room today for evaluation of nausea and abdominal pain for the past couple days.  See HPI.  His vitals are unremarkable.  His abdominal exam is benign.  He had labs obtained in triage that were unremarkable.  He actually has not vomited.  He remains clinically and vitally stable for almost 3 hours.  He was given the interventions above and desires discharge.  I feel that this is reasonable.  Mother and patient are quite confident that this is a similar presentation to prior ER visits where he was diagnosed with gastroparesis.  Radiograph today completed in triage that appears consistent with prior gastroparesis images.  I had a discussion with mom regarding further imaging today versus symptomatic care at home.  The latter was decided as he is feeling better.  Patient is to return with new or worsening symptoms.  No further questions and agrees with plan.    Diagnosis:    ICD-10-CM    1. Nausea and vomiting, intractability of vomiting not specified, unspecified vomiting type  R11.2        Discharge Medications:  Discharge Medication List as of 7/27/2022  4:51 PM          Scribe Disclosure:  I, Clyde Lackey, am serving as a scribe at 3:15 PM on 7/27/2022 to document services personally performed by Parag Fish PA-C based on my observations and the provider's statements to me.        Parag Fish PA-C  07/27/22 1700

## 2022-07-27 NOTE — ED TRIAGE NOTES
C/o abd pain, unable to take colace, feeling nauseated     Triage Assessment     Row Name 07/27/22 8198       Triage Assessment (Adult)    Airway WDL WDL       Respiratory WDL    Respiratory WDL WDL       Cardiac WDL    Cardiac WDL WDL       Cognitive/Neuro/Behavioral WDL    Cognitive/Neuro/Behavioral WDL WDL

## 2022-08-03 ENCOUNTER — OFFICE VISIT (OUTPATIENT)
Dept: INTERNAL MEDICINE | Facility: CLINIC | Age: 32
End: 2022-08-03
Payer: MEDICAID

## 2022-08-03 VITALS
WEIGHT: 191 LBS | TEMPERATURE: 98.1 F | HEART RATE: 65 BPM | DIASTOLIC BLOOD PRESSURE: 71 MMHG | SYSTOLIC BLOOD PRESSURE: 112 MMHG | BODY MASS INDEX: 23.25 KG/M2 | OXYGEN SATURATION: 97 %

## 2022-08-03 DIAGNOSIS — R11.15 CYCLIC VOMITING SYNDROME: Primary | ICD-10-CM

## 2022-08-03 DIAGNOSIS — F41.9 ANXIETY: ICD-10-CM

## 2022-08-03 DIAGNOSIS — K31.84 GASTROPARESIS: ICD-10-CM

## 2022-08-03 PROCEDURE — 99214 OFFICE O/P EST MOD 30 MIN: CPT | Performed by: INTERNAL MEDICINE

## 2022-08-03 ASSESSMENT — PATIENT HEALTH QUESTIONNAIRE - PHQ9
SUM OF ALL RESPONSES TO PHQ QUESTIONS 1-9: 4
SUM OF ALL RESPONSES TO PHQ QUESTIONS 1-9: 4
10. IF YOU CHECKED OFF ANY PROBLEMS, HOW DIFFICULT HAVE THESE PROBLEMS MADE IT FOR YOU TO DO YOUR WORK, TAKE CARE OF THINGS AT HOME, OR GET ALONG WITH OTHER PEOPLE: VERY DIFFICULT

## 2022-08-03 NOTE — PROGRESS NOTES
Assessment & Plan   Problem List Items Addressed This Visit     Anxiety    Cyclic vomiting syndrome - Primary    Gastroparesis                  I suspect that this most recent episode of acute vomiting was probably related to more functional matters related to acute stress.        Continue the fiber supplementation, and continue to use the Miralax powder every day, or however often it takes to get regular bowel movements, somewhere between twice per day to every other day.         Continue all medications at the same doses.  Contact your usual pharmacy if you need refills.        Use the Metoclopramide and or the Lorazepam as needed for future acute episodes of nausea and vomiting.        The [facility]ology [facility] recommended a atrial of another medication on a daily basis to help prevent episodes called Amitriptyline. However, this medication has possible interactions with numerous other more important medications from your psychiatrist, so I would prefer to avoid it for now.         Follow up with the Gastroenterology Clinic as planned in September.         Consider removing gluten from his diet to see if this has any impact on your intestinal issues or possibly improve autism symptoms.                  Return in about 6 months (around 2/3/2023) for Medication check, Routine Visit.    Bhaskar Gandara MD  [facility]    Emma Rodrigues is a 31 year old accompanied by his mother, presenting for the following health issues:  Hospital F/U      History of Present Illness       Reason for visit:  Follow up from ER visit    He eats 2-3 servings of fruits and vegetables daily.He consumes 1 sweetened beverage(s) daily.He exercises with enough effort to increase his heart rate 30 to 60 minutes per day.  He exercises with enough effort to increase his heart rate 7 days per week.   He is taking medications regularly.    Today's PHQ-9         PHQ-9 Total Score: 4    PHQ-9 Q9  Thoughts of better off dead/self-harm past 2 weeks :   Not at all    How difficult have these problems made it for you to do your work, take care of things at home, or get along with other people: Very difficult         Hospital Follow-up Visit:    Hospital/Nursing Home/IP Rehab Facility: [facility]  Date of Admission: 7/27/22  Date of Discharge: 7/27/22   Reason(s) for Admission: Nausea and vomiting, intractability of vomiting not specified, unspecified vomiting type        Was your hospitalization related to COVID-19? No   Problems taking medications regularly:  None  Medication changes since discharge: None  Problems adhering to non-medication therapy:  None    Summary of hospitalization:  [facility] discharge summary reviewed  Diagnostic Tests/Treatments reviewed.  Follow up needed: none  Other Healthcare Providers Involved in Patient s Care:         Specialist appointment - Gastroenterology  Update since discharge: improved. Post Medication Reconciliation Status:        Plan of care communicated with mother         **I reviewed the information recorded in the patient's EPIC chart (including but not limited to medical history, surgical history, family history, problem list, medication list, and allergy list) and updated the information as indicated based on the patients reported information.         Review of Systems   Constitutional, HEENT, cardiovascular, pulmonary, gi and gu systems are negative, except as otherwise noted.      Objective    /71   Pulse 65   Temp 98.1  F (36.7  C)   Wt 86.6 kg (191 lb)   SpO2 97%   BMI 23.25 kg/m    Body mass index is 23.25 kg/m .  Physical Exam     GENERAL alert and no distress  EYES:  Normal sclera,conjunctiva, EOMI  HENT: oral and posterior pharynx without lesions or erythema, facies symmetric  NECK: Neck supple. No LAD, without thyroidmegaly.  RESP: Clear to ausculation bilaterally without wheezes or crackles. Normal BS  in all fields.  CV: RRR normal S1S2 without murmurs, rubs or gallops.  LYMPH: no cervical lymph adenopathy appreciated  MS: extremities- no gross deformities of the visible extremities noted,   EXT:  no lower extremity edema  PSYCH: Alert and oriented times 3; speech- coherent  SKIN:  No obvious significant skin lesions on visible portions of face                     .  ..

## 2022-09-12 ENCOUNTER — TRANSFERRED RECORDS (OUTPATIENT)
Dept: HEALTH INFORMATION MANAGEMENT | Facility: CLINIC | Age: 32
End: 2022-09-12

## 2022-09-12 LAB
ALT SERPL-CCNC: 24 IU/L (ref 0–44)
AST SERPL-CCNC: 17 IU/L (ref 0–40)
CREATININE (EXTERNAL): 1 MG/DL (ref 0.76–1.27)
GFR ESTIMATED (EXTERNAL): 102 ML/MIN/1.73
GLUCOSE (EXTERNAL): 86 MG/DL (ref 65–99)
POTASSIUM (EXTERNAL): 4.5 MMOL/L (ref 3.5–5.2)
TSH SERPL-ACNC: 2.1 UIU/ML (ref 0.45–4.5)

## 2022-09-13 ENCOUNTER — TRANSFERRED RECORDS (OUTPATIENT)
Dept: HEALTH INFORMATION MANAGEMENT | Facility: CLINIC | Age: 32
End: 2022-09-13

## 2022-10-14 DIAGNOSIS — E06.3 AUTOIMMUNE HYPOTHYROIDISM: ICD-10-CM

## 2022-10-14 RX ORDER — LEVOTHYROXINE SODIUM 125 UG/1
TABLET ORAL
Qty: 90 TABLET | Refills: 2 | Status: SHIPPED | OUTPATIENT
Start: 2022-10-14 | End: 2023-07-10

## 2022-10-14 NOTE — TELEPHONE ENCOUNTER
Prescription approved per [facility] Refill Protocol.  Justice L. Phoenix, RN

## 2022-10-15 ENCOUNTER — HEALTH MAINTENANCE LETTER (OUTPATIENT)
Age: 32
End: 2022-10-15

## 2022-10-27 DIAGNOSIS — J30.2 SEASONAL ALLERGIC RHINITIS, UNSPECIFIED TRIGGER: ICD-10-CM

## 2022-10-28 RX ORDER — CETIRIZINE HYDROCHLORIDE 10 MG/1
TABLET ORAL
Qty: 90 TABLET | Refills: 2 | Status: SHIPPED | OUTPATIENT
Start: 2022-10-28 | End: 2023-07-10

## 2022-10-28 NOTE — TELEPHONE ENCOUNTER
Prescription approved per [facility] Refill Protocol.  Navin Novoa RN  [facility] Triage Nurse

## 2022-11-14 ENCOUNTER — DOCUMENTATION ONLY (OUTPATIENT)
Dept: LAB | Facility: CLINIC | Age: 32
End: 2022-11-14

## 2022-11-14 DIAGNOSIS — Z11.59 NEED FOR HEPATITIS C SCREENING TEST: ICD-10-CM

## 2022-11-14 DIAGNOSIS — Z79.899 ENCOUNTER FOR LONG-TERM (CURRENT) USE OF HIGH-RISK MEDICATION: Primary | ICD-10-CM

## 2023-01-01 ENCOUNTER — HOSPITAL ENCOUNTER (EMERGENCY)
Facility: CLINIC | Age: 33
Discharge: HOME | End: 2023-01-01
Attending: EMERGENCY MEDICINE | Admitting: EMERGENCY MEDICINE
Payer: MEDICAID

## 2023-01-01 VITALS
TEMPERATURE: 98.5 F | HEART RATE: 91 BPM | DIASTOLIC BLOOD PRESSURE: 73 MMHG | OXYGEN SATURATION: 98 % | SYSTOLIC BLOOD PRESSURE: 126 MMHG | RESPIRATION RATE: 16 BRPM

## 2023-01-01 DIAGNOSIS — R11.2 NAUSEA AND VOMITING, UNSPECIFIED VOMITING TYPE: ICD-10-CM

## 2023-01-01 DIAGNOSIS — R10.13 ABDOMINAL PAIN, EPIGASTRIC: ICD-10-CM

## 2023-01-01 LAB
ALBUMIN SERPL-MCNC: 4.5 G/DL (ref 3.4–5)
ALP SERPL-CCNC: 76 U/L (ref 40–150)
ALT SERPL W P-5'-P-CCNC: 47 U/L (ref 0–70)
ANION GAP SERPL CALCULATED.3IONS-SCNC: 6 MMOL/L (ref 3–14)
AST SERPL W P-5'-P-CCNC: 24 U/L (ref 0–45)
BASOPHILS # BLD AUTO: 0 10E3/UL (ref 0–0.2)
BASOPHILS NFR BLD AUTO: 0 %
BILIRUB SERPL-MCNC: 0.9 MG/DL (ref 0.2–1.3)
BUN SERPL-MCNC: 7 MG/DL (ref 7–30)
CALCIUM SERPL-MCNC: 9.4 MG/DL (ref 8.5–10.1)
CHLORIDE BLD-SCNC: 100 MMOL/L (ref 94–109)
CO2 SERPL-SCNC: 28 MMOL/L (ref 20–32)
CREAT SERPL-MCNC: 0.82 MG/DL (ref 0.66–1.25)
EOSINOPHIL # BLD AUTO: 0 10E3/UL (ref 0–0.7)
EOSINOPHIL NFR BLD AUTO: 0 %
ERYTHROCYTE [DISTWIDTH] IN BLOOD BY AUTOMATED COUNT: 11.7 % (ref 10–15)
GFR SERPL CREATININE-BSD FRML MDRD: >90 ML/MIN/1.73M2
GLUCOSE BLD-MCNC: 104 MG/DL (ref 70–99)
HCT VFR BLD AUTO: 42.6 % (ref 40–53)
HGB BLD-MCNC: 15.1 G/DL (ref 13.3–17.7)
HOLD SPECIMEN: NORMAL
HOLD SPECIMEN: NORMAL
IMM GRANULOCYTES # BLD: 0 10E3/UL
IMM GRANULOCYTES NFR BLD: 0 %
LIPASE SERPL-CCNC: 96 U/L (ref 73–393)
LYMPHOCYTES # BLD AUTO: 2.2 10E3/UL (ref 0.8–5.3)
LYMPHOCYTES NFR BLD AUTO: 21 %
MCH RBC QN AUTO: 29.5 PG (ref 26.5–33)
MCHC RBC AUTO-ENTMCNC: 35.4 G/DL (ref 31.5–36.5)
MCV RBC AUTO: 83 FL (ref 78–100)
MONOCYTES # BLD AUTO: 0.5 10E3/UL (ref 0–1.3)
MONOCYTES NFR BLD AUTO: 5 %
NEUTROPHILS # BLD AUTO: 7.7 10E3/UL (ref 1.6–8.3)
NEUTROPHILS NFR BLD AUTO: 74 %
NRBC # BLD AUTO: 0 10E3/UL
NRBC BLD AUTO-RTO: 0 /100
PLATELET # BLD AUTO: 212 10E3/UL (ref 150–450)
POTASSIUM BLD-SCNC: 3.5 MMOL/L (ref 3.4–5.3)
PROT SERPL-MCNC: 8 G/DL (ref 6.8–8.8)
RBC # BLD AUTO: 5.11 10E6/UL (ref 4.4–5.9)
SODIUM SERPL-SCNC: 134 MMOL/L (ref 133–144)
WBC # BLD AUTO: 10.5 10E3/UL (ref 4–11)

## 2023-01-01 PROCEDURE — 83690 ASSAY OF LIPASE: CPT | Performed by: EMERGENCY MEDICINE

## 2023-01-01 PROCEDURE — 80053 COMPREHEN METABOLIC PANEL: CPT | Performed by: EMERGENCY MEDICINE

## 2023-01-01 PROCEDURE — 85025 COMPLETE CBC W/AUTO DIFF WBC: CPT | Performed by: EMERGENCY MEDICINE

## 2023-01-01 PROCEDURE — 36415 COLL VENOUS BLD VENIPUNCTURE: CPT | Performed by: EMERGENCY MEDICINE

## 2023-01-01 PROCEDURE — 96361 HYDRATE IV INFUSION ADD-ON: CPT

## 2023-01-01 PROCEDURE — 250N000011 HC RX IP 250 OP 636: Performed by: EMERGENCY MEDICINE

## 2023-01-01 PROCEDURE — 258N000003 HC RX IP 258 OP 636: Performed by: EMERGENCY MEDICINE

## 2023-01-01 PROCEDURE — 96375 TX/PRO/DX INJ NEW DRUG ADDON: CPT

## 2023-01-01 PROCEDURE — 99284 EMERGENCY DEPT VISIT MOD MDM: CPT | Mod: 25

## 2023-01-01 PROCEDURE — 96374 THER/PROPH/DIAG INJ IV PUSH: CPT

## 2023-01-01 RX ORDER — DIPHENHYDRAMINE HYDROCHLORIDE 50 MG/ML
25 INJECTION INTRAMUSCULAR; INTRAVENOUS ONCE
Status: COMPLETED | OUTPATIENT
Start: 2023-01-01 | End: 2023-01-01

## 2023-01-01 RX ORDER — KETOROLAC TROMETHAMINE 15 MG/ML
15 INJECTION, SOLUTION INTRAMUSCULAR; INTRAVENOUS ONCE
Status: COMPLETED | OUTPATIENT
Start: 2023-01-01 | End: 2023-01-01

## 2023-01-01 RX ORDER — METOCLOPRAMIDE HYDROCHLORIDE 5 MG/ML
10 INJECTION INTRAMUSCULAR; INTRAVENOUS ONCE
Status: COMPLETED | OUTPATIENT
Start: 2023-01-01 | End: 2023-01-01

## 2023-01-01 RX ADMIN — SODIUM CHLORIDE 1000 ML: 9 INJECTION, SOLUTION INTRAVENOUS at 20:13

## 2023-01-01 RX ADMIN — METOCLOPRAMIDE 10 MG: 5 INJECTION, SOLUTION INTRAMUSCULAR; INTRAVENOUS at 20:18

## 2023-01-01 RX ADMIN — DIPHENHYDRAMINE HYDROCHLORIDE 25 MG: 50 INJECTION, SOLUTION INTRAMUSCULAR; INTRAVENOUS at 20:19

## 2023-01-01 RX ADMIN — KETOROLAC TROMETHAMINE 15 MG: 15 INJECTION, SOLUTION INTRAMUSCULAR; INTRAVENOUS at 20:20

## 2023-01-01 ASSESSMENT — ENCOUNTER SYMPTOMS
DIARRHEA: 0
COUGH: 0
FEVER: 0
VOMITING: 1
ABDOMINAL PAIN: 1
NERVOUS/ANXIOUS: 1

## 2023-01-01 ASSESSMENT — ACTIVITIES OF DAILY LIVING (ADL): ADLS_ACUITY_SCORE: 35

## 2023-01-02 ENCOUNTER — PATIENT OUTREACH (OUTPATIENT)
Dept: INTERNAL MEDICINE | Facility: CLINIC | Age: 33
End: 2023-01-02

## 2023-01-02 ENCOUNTER — TELEPHONE (OUTPATIENT)
Dept: INTERNAL MEDICINE | Facility: CLINIC | Age: 33
End: 2023-01-02

## 2023-01-02 ENCOUNTER — APPOINTMENT (OUTPATIENT)
Dept: CT IMAGING | Facility: CLINIC | Age: 33
End: 2023-01-02
Attending: EMERGENCY MEDICINE
Payer: MEDICAID

## 2023-01-02 ENCOUNTER — PATIENT OUTREACH (OUTPATIENT)
Dept: CARE COORDINATION | Facility: CLINIC | Age: 33
End: 2023-01-02

## 2023-01-02 ENCOUNTER — HOSPITAL ENCOUNTER (EMERGENCY)
Facility: CLINIC | Age: 33
Discharge: HOME | End: 2023-01-02
Attending: PHYSICIAN ASSISTANT | Admitting: PHYSICIAN ASSISTANT
Payer: MEDICAID

## 2023-01-02 VITALS
TEMPERATURE: 97 F | SYSTOLIC BLOOD PRESSURE: 138 MMHG | HEART RATE: 68 BPM | OXYGEN SATURATION: 97 % | DIASTOLIC BLOOD PRESSURE: 82 MMHG | RESPIRATION RATE: 18 BRPM

## 2023-01-02 DIAGNOSIS — R10.84 ABDOMINAL PAIN, GENERALIZED: ICD-10-CM

## 2023-01-02 LAB
ALBUMIN SERPL-MCNC: 4.8 G/DL (ref 3.4–5)
ALP SERPL-CCNC: 80 U/L (ref 40–150)
ALT SERPL W P-5'-P-CCNC: 44 U/L (ref 0–70)
ANION GAP SERPL CALCULATED.3IONS-SCNC: 9 MMOL/L (ref 3–14)
AST SERPL W P-5'-P-CCNC: 25 U/L (ref 0–45)
BASOPHILS # BLD AUTO: 0 10E3/UL (ref 0–0.2)
BASOPHILS NFR BLD AUTO: 1 %
BILIRUB SERPL-MCNC: 0.8 MG/DL (ref 0.2–1.3)
BUN SERPL-MCNC: 7 MG/DL (ref 7–30)
CALCIUM SERPL-MCNC: 9.8 MG/DL (ref 8.5–10.1)
CHLORIDE BLD-SCNC: 106 MMOL/L (ref 94–109)
CO2 SERPL-SCNC: 23 MMOL/L (ref 20–32)
CREAT SERPL-MCNC: 0.81 MG/DL (ref 0.66–1.25)
EOSINOPHIL # BLD AUTO: 0 10E3/UL (ref 0–0.7)
EOSINOPHIL NFR BLD AUTO: 0 %
ERYTHROCYTE [DISTWIDTH] IN BLOOD BY AUTOMATED COUNT: 11.9 % (ref 10–15)
GFR SERPL CREATININE-BSD FRML MDRD: >90 ML/MIN/1.73M2
GLUCOSE BLD-MCNC: 98 MG/DL (ref 70–99)
HCT VFR BLD AUTO: 44.7 % (ref 40–53)
HGB BLD-MCNC: 15.6 G/DL (ref 13.3–17.7)
HOLD SPECIMEN: NORMAL
HOLD SPECIMEN: NORMAL
IMM GRANULOCYTES # BLD: 0 10E3/UL
IMM GRANULOCYTES NFR BLD: 1 %
LIPASE SERPL-CCNC: 96 U/L (ref 73–393)
LYMPHOCYTES # BLD AUTO: 1.5 10E3/UL (ref 0.8–5.3)
LYMPHOCYTES NFR BLD AUTO: 19 %
MCH RBC QN AUTO: 29.5 PG (ref 26.5–33)
MCHC RBC AUTO-ENTMCNC: 34.9 G/DL (ref 31.5–36.5)
MCV RBC AUTO: 85 FL (ref 78–100)
MONOCYTES # BLD AUTO: 0.4 10E3/UL (ref 0–1.3)
MONOCYTES NFR BLD AUTO: 6 %
NEUTROPHILS # BLD AUTO: 5.9 10E3/UL (ref 1.6–8.3)
NEUTROPHILS NFR BLD AUTO: 73 %
NRBC # BLD AUTO: 0 10E3/UL
NRBC BLD AUTO-RTO: 0 /100
PLATELET # BLD AUTO: 234 10E3/UL (ref 150–450)
POTASSIUM BLD-SCNC: 3.7 MMOL/L (ref 3.4–5.3)
PROT SERPL-MCNC: 8.1 G/DL (ref 6.8–8.8)
RBC # BLD AUTO: 5.29 10E6/UL (ref 4.4–5.9)
SODIUM SERPL-SCNC: 138 MMOL/L (ref 133–144)
WBC # BLD AUTO: 7.9 10E3/UL (ref 4–11)

## 2023-01-02 PROCEDURE — 80053 COMPREHEN METABOLIC PANEL: CPT | Performed by: PHYSICIAN ASSISTANT

## 2023-01-02 PROCEDURE — 85025 COMPLETE CBC W/AUTO DIFF WBC: CPT | Performed by: EMERGENCY MEDICINE

## 2023-01-02 PROCEDURE — 250N000011 HC RX IP 250 OP 636: Performed by: EMERGENCY MEDICINE

## 2023-01-02 PROCEDURE — 83690 ASSAY OF LIPASE: CPT | Performed by: EMERGENCY MEDICINE

## 2023-01-02 PROCEDURE — 250N000009 HC RX 250: Performed by: EMERGENCY MEDICINE

## 2023-01-02 PROCEDURE — 36415 COLL VENOUS BLD VENIPUNCTURE: CPT | Performed by: EMERGENCY MEDICINE

## 2023-01-02 PROCEDURE — 74177 CT ABD & PELVIS W/CONTRAST: CPT

## 2023-01-02 PROCEDURE — 83690 ASSAY OF LIPASE: CPT | Performed by: PHYSICIAN ASSISTANT

## 2023-01-02 PROCEDURE — 80053 COMPREHEN METABOLIC PANEL: CPT | Performed by: EMERGENCY MEDICINE

## 2023-01-02 PROCEDURE — 258N000003 HC RX IP 258 OP 636: Performed by: EMERGENCY MEDICINE

## 2023-01-02 PROCEDURE — 96374 THER/PROPH/DIAG INJ IV PUSH: CPT | Mod: 59

## 2023-01-02 PROCEDURE — 96361 HYDRATE IV INFUSION ADD-ON: CPT

## 2023-01-02 PROCEDURE — 99285 EMERGENCY DEPT VISIT HI MDM: CPT | Mod: 25

## 2023-01-02 PROCEDURE — 85025 COMPLETE CBC W/AUTO DIFF WBC: CPT | Performed by: PHYSICIAN ASSISTANT

## 2023-01-02 RX ORDER — IOPAMIDOL 755 MG/ML
96 INJECTION, SOLUTION INTRAVASCULAR ONCE
Status: COMPLETED | OUTPATIENT
Start: 2023-01-02 | End: 2023-01-02

## 2023-01-02 RX ORDER — KETOROLAC TROMETHAMINE 15 MG/ML
15 INJECTION, SOLUTION INTRAMUSCULAR; INTRAVENOUS ONCE
Status: DISCONTINUED | OUTPATIENT
Start: 2023-01-02 | End: 2023-01-02 | Stop reason: HOSPADM

## 2023-01-02 RX ORDER — ONDANSETRON 2 MG/ML
4 INJECTION INTRAMUSCULAR; INTRAVENOUS ONCE
Status: COMPLETED | OUTPATIENT
Start: 2023-01-02 | End: 2023-01-02

## 2023-01-02 RX ORDER — METOCLOPRAMIDE HYDROCHLORIDE 5 MG/ML
10 INJECTION INTRAMUSCULAR; INTRAVENOUS ONCE
Status: DISCONTINUED | OUTPATIENT
Start: 2023-01-02 | End: 2023-01-02 | Stop reason: HOSPADM

## 2023-01-02 RX ORDER — DIPHENHYDRAMINE HYDROCHLORIDE 50 MG/ML
25 INJECTION INTRAMUSCULAR; INTRAVENOUS ONCE
Status: DISCONTINUED | OUTPATIENT
Start: 2023-01-02 | End: 2023-01-02 | Stop reason: HOSPADM

## 2023-01-02 RX ADMIN — SODIUM CHLORIDE 68 ML: 9 INJECTION, SOLUTION INTRAVENOUS at 17:17

## 2023-01-02 RX ADMIN — ONDANSETRON 4 MG: 2 INJECTION INTRAMUSCULAR; INTRAVENOUS at 11:57

## 2023-01-02 RX ADMIN — SODIUM CHLORIDE 1000 ML: 9 INJECTION, SOLUTION INTRAVENOUS at 11:57

## 2023-01-02 RX ADMIN — IOPAMIDOL 96 ML: 755 INJECTION, SOLUTION INTRAVENOUS at 17:17

## 2023-01-02 ASSESSMENT — ENCOUNTER SYMPTOMS
VOMITING: 1
DIFFICULTY URINATING: 0
ABDOMINAL PAIN: 1
FEVER: 0
DIARRHEA: 1
SHORTNESS OF BREATH: 0

## 2023-01-02 ASSESSMENT — ACTIVITIES OF DAILY LIVING (ADL)
ADLS_ACUITY_SCORE: 33
ADLS_ACUITY_SCORE: 35

## 2023-01-02 NOTE — TELEPHONE ENCOUNTER
Reason for Call:  Appointment Request    Patient requesting this type of appt:  Hospital/ED Follow-Up     Requested provider: Bhaskar Gandara    Reason patient unable to be scheduled: Not within requested timeframe    When does patient want to be seen/preferred time: 1-2 days    Comments: wants as soon as possible at [facility]    Could we send this information to you in [facility] or would you prefer to receive a phone call?:   Patient would prefer a phone call   Okay to leave a detailed message?: Yes at Home number on file [phone] (home)    Call taken on 1/2/2023 at 9:38 AM by Misty Leyva

## 2023-01-02 NOTE — PROGRESS NOTES
Clinic Care Coordination Contact  Care Team Conversations    CC consulted on status of guardianship. Per chart review, patient is under legal guardianship. Proper guardianship paperwork on file and it has been vetted by the [employer] department. Per chart review Ravi (father) and Carmen (mother) are co-guardians. Updated clinic staff and management on above. No further CC needs identified at this time.     Lupe Person, MSW/LICSW covering for MARIA A FernandoW  Social Work Care Coordinator  [facility], [place], and [place]  Phone: [phone]

## 2023-01-02 NOTE — ED TRIAGE NOTES
Mom states he has gastroparesis.  Patient shaking and very anxious in triage       Triage Assessment     Row Name 01/01/23 1948       Triage Assessment (Adult)    Airway WDL WDL       Respiratory WDL    Respiratory WDL WDL       Skin Circulation/Temperature WDL    Skin Circulation/Temperature WDL WDL       Cardiac WDL    Cardiac WDL WDL       Peripheral/Neurovascular WDL    Peripheral Neurovascular WDL WDL       Cognitive/Neuro/Behavioral WDL    Cognitive/Neuro/Behavioral WDL X    Mood/Behavior anxious;agitated

## 2023-01-02 NOTE — DISCHARGE INSTRUCTIONS
Discharge Instructions  Vomiting    You have been seen today for vomiting (throwing up). This is usually caused by a virus, but some bacteria, parasites, medicines or other medical conditions can cause similar symptoms. At this time your provider does not find that your vomiting is a sign of anything dangerous or life-threatening. However, sometimes the signs of serious illness do not show up right away. If you have new or worse symptoms, you may need to be seen again in the Emergency Department or by your primary provider. Remember that serious problems like appendicitis can start as vomiting.    Generally, every Emergency Department visit should have a follow-up clinic visit with either a primary or a specialty clinic/provider. Please follow-up as instructed by your emergency provider today.    Return to the Emergency Department if:  You keep vomiting and you are not able to keep liquids down.   You feel you are getting dehydrated, such as being very thirsty, not urinating (peeing) at least every 8-12 hours, or feeling faint or lightheaded.   You develop a new fever, or your fever continues for more than 2 days.   You have abdominal (belly pain) that seems worse than cramps, is in one spot, or is getting worse over time. Appendicitis usually causes pain in the right lower abdomen (to the right and below your belly button) so watch for pain in this location.  You have blood in your vomit or stools.   You feel very weak.  You are not starting to improve within 24 hours of your visit here.     What can I do to help myself?  The most important thing to do is to drink clear liquids. If you have been vomiting a lot, it is best to have only small, frequent sips of liquids. Drinking too much at once may cause more vomiting. If you are vomiting often, you must replace minerals, sodium and potassium lost with your illness. Pedialyte  is the best available rehydration liquid but some find that it doesn t taste good so sports  drinks are an alterative. You can also drink clear liquids such as water, weak tea, apple juice, and 7-Up . Avoid acid liquids (orange), caffeine (coffee) or alcohol. Do not drink milk until you no longer have diarrhea (loose stools).   After liquids are staying down, you may start eating mild foods. Soda crackers, toast, plain noodles, gelatin, applesauce and bananas are good first choices. Avoid foods that have acid, are spicy, fatty or have a lot of fiber (such as meats, coarse grains, vegetables). You may start eating these foods again in about 3 days when you are better.   Sometimes treatment includes prescription medicine to prevent nausea (sick to your stomach) and vomiting. If your provider prescribes these for you, take them as directed.   Do not take ibuprofen, naproxen, or other nonsteroidal anti-inflammatory (NSAID) medicines without checking with your healthcare provider.     If you were given a prescription for medicine here today, be sure to read all of the information (including the package insert) that comes with your prescription.  This will include important information about the medicine, its side effects, and any warnings that you need to know about.  The pharmacist who fills the prescription can provide more information and answer questions you may have about the medicine.  If you have questions or concerns that the pharmacist cannot address, please call or return to the Emergency Department.     Remember that you can always come back to the Emergency Department if you are not able to see your regular provider in the amount of time listed above, if you get any new symptoms, or if there is anything that worries you.    Discharge Instructions  Abdominal Pain    Abdominal pain (belly pain) can be caused by many things. Your evaluation today does not show the exact cause for your pain. Your provider today has decided that it is unlikely your pain is due to a life threatening problem, or a problem  requiring surgery or hospital admission. Sometimes those problems cannot be found right away, so it is very important that you follow up as directed.  Sometimes only the changes which occur over time allow the cause of your pain to be found.    Generally, every Emergency Department visit should have a follow-up clinic visit with either a primary or a specialty clinic/provider. Please follow-up as instructed by your emergency provider today. With abdominal pain, we often recommend very close follow-up, such as the following day.    ADULTS:  Return to the Emergency Department right away if:    You get an oral temperature above 102oF or as directed by your provider.  You have blood in your stools. This may be bright red or appear as black, tarry stools.    You keep vomiting (throwing up) or cannot drink liquids.  You see blood when you vomit.   You cannot have a bowel movement or you cannot pass gas.  Your stomach gets bloated or bigger.  Your skin or the whites of your eyes look yellow.  You faint.  You have bloody, frequent or painful urination (peeing).  You have new symptoms or anything that worries you.    CHILDREN:  Return to the Emergency Department right away if your child has any of the above-listed symptoms or the following:    Pushes your hand away or screams/cries when his/her belly is touched.  You notice your child is very fussy or weak.  Your child is very tired and is too tired to eat or drink.  Your child is dehydrated.  Signs of dehydration can be:  Significant change in the amount of wet diapers/urine.  Your infant or child starts to have dry mouth and lips, or no saliva (spit) or tears.    PREGNANT WOMEN:  Return to the Emergency Department right away if you have any of the above-listed symptoms or the following:    You have bleeding, leaking fluid or passing tissue from the vagina.  You have worse pain or cramping, or pain in your shoulder or back.  You have vomiting that will not stop.  You have a  temperature of 100oF or more.  Your baby is not moving as much as usual.  You faint.  You get a bad headache with or without eye problems and abdominal pain.  You have a seizure.  You have unusual discharge from your vagina and abdominal pain.    Abdominal pain is pretty common during pregnancy.  Your pain may or may not be related to your pregnancy. You should follow-up closely with your OB provider so they can evaluate you and your baby.  Until you follow-up with your regular provider, do the following:     Avoid sex and do not put anything in your vagina.  Drink clear fluids.  Only take medications approved by your provider.    MORE INFORMATION:    Appendicitis:  A possible cause of abdominal pain in any person who still has their appendix is acute appendicitis. Appendicitis is often hard to diagnose.  Testing does not always rule out early appendicitis or other causes of abdominal pain. Close follow-up with your provider and re-evaluations may be needed to figure out the reason for your abdominal pain.    Follow-up:  It is very important that you make an appointment with your clinic and go to the appointment.  If you do not follow-up with your primary provider, it may result in missing an important development which could result in permanent injury or disability and/or lasting pain.  If there is any problem keeping your appointment, call your provider or return to the Emergency Department.    Medications:  Take your medications as directed by your provider today.  Before using over-the-counter medications, ask your provider and make sure to take the medications as directed.  If you have any questions about medications, ask your provider.    Diet:  Resume your normal diet as much as possible, but do not eat fried, fatty or spicy foods while you have pain.  Do not drink alcohol or have caffeine.  Do not smoke tobacco.    Probiotics: If you have been given an antibiotic, you may want to also take a probiotic pill  "or eat yogurt with live cultures. Probiotics have \"good bacteria\" to help your intestines stay healthy. Studies have shown that probiotics help prevent diarrhea (loose stools) and other intestine problems (including C. diff infection) when you take antibiotics. You can buy these without a prescription in the pharmacy section of the store.     If you were given a prescription for medicine here today, be sure to read all of the information (including the package insert) that comes with your prescription.  This will include important information about the medicine, its side effects, and any warnings that you need to know about.  The pharmacist who fills the prescription can provide more information and answer questions you may have about the medicine.  If you have questions or concerns that the pharmacist cannot address, please call or return to the Emergency Department.       Remember that you can always come back to the Emergency Department if you are not able to see your regular provider in the amount of time listed above, if you get any new symptoms, or if there is anything that worries you.    "

## 2023-01-02 NOTE — ED PROVIDER NOTES
History     Chief Complaint:  Anxiety and Abdominal Pain       HPI   Ravi Dunne is a 32 year old male who has a history of autism and presents with anxiety and abdominal pain. Patient started to rock back and forth at around 1530, asking to have dinner and other evening routine items. He started then to have vomiting episodes soon after. Mother denies fever, cough, or diarrhea. She has noticed constipation.  Mother notes that he has very frequent episodes similar to this that improved with antiemetics.  Previously he has had imaging of the abdomen without acute pathology identified.  She reports that gastroenterology believe that he has gastroparesis.    Independent Historian: history from patient's mother      ROS:  Review of Systems   Constitutional: Negative for fever.   Respiratory: Negative for cough.    Gastrointestinal: Positive for abdominal pain and vomiting. Negative for diarrhea.   Psychiatric/Behavioral: The patient is nervous/anxious.    All other systems reviewed and are negative.      Allergies:  Sulfa Drugs  Unknown [No Clinical Screening - See Comments]     Medications:    benzonatate  cetirizine   escitalopram   hydroxyzine   levothyroxine   Lorazepam   metoclopramide   omeprazole   Oxcarbazepine   Quetiapine   simethicone   Sumatriptan    Past Medical History:    Anxiety  Hypothyroidism  Cyclic vomiting syndrome  Development delay  Hyperlipidemia  Mood disorder  Gastroparesis  Autism      Family History:    family history includes Alcoholism in his father; Autism Spectrum Disorder in his brother; Cancer (age of onset: 50) in his father.    Social History:   reports that he has never smoked. He has never used smokeless tobacco. He reports that he does not drink alcohol and does not use drugs.  PCP: Bhaskar Gandara     Physical Exam     Patient Vitals for the past 24 hrs:   BP Temp Temp src Pulse Resp SpO2   01/01/23 2026 126/73 98.5  F (36.9  C) Temporal -- -- --   01/01/23 1953 (!)  150/98 -- -- 91 16 98 %        Physical Exam  VS: Reviewed per above  HENT: normal speech  EYES: sclera anicteric  CV: Rate as noted, regular rhythm.   RESP: Effort normal. Breath sounds are normal bilaterally.  GI: mild general tenderness without rebound/guarding, not distended.  NEURO: Alert, moving all extremities  MSK: No deformity of the extremities  SKIN: Warm and dry      Emergency Department Course     Laboratory:  Labs Ordered and Resulted from Time of ED Arrival to Time of ED Departure   COMPREHENSIVE METABOLIC PANEL - Abnormal       Result Value    Sodium 134      Potassium 3.5      Chloride 100      Carbon Dioxide (CO2) 28      Anion Gap 6      Urea Nitrogen 7      Creatinine 0.82      Calcium 9.4      Glucose 104 (*)     Alkaline Phosphatase 76      AST 24      ALT 47      Protein Total 8.0      Albumin 4.5      Bilirubin Total 0.9      GFR Estimate >90     LIPASE - Normal    Lipase 96     CBC WITH PLATELETS AND DIFFERENTIAL    WBC Count 10.5      RBC Count 5.11      Hemoglobin 15.1      Hematocrit 42.6      MCV 83      MCH 29.5      MCHC 35.4      RDW 11.7      Platelet Count 212      % Neutrophils 74      % Lymphocytes 21      % Monocytes 5      % Eosinophils 0      % Basophils 0      % Immature Granulocytes 0      NRBCs per 100 WBC 0      Absolute Neutrophils 7.7      Absolute Lymphocytes 2.2      Absolute Monocytes 0.5      Absolute Eosinophils 0.0      Absolute Basophils 0.0      Absolute Immature Granulocytes 0.0      Absolute NRBCs 0.0        Emergency Department Course & Assessments:    Interventions:  Medications   0.9% sodium chloride BOLUS (0 mLs Intravenous Stopped 1/1/23 2105)   metoclopramide (REGLAN) injection 10 mg (10 mg Intravenous Given 1/1/23 2018)   diphenhydrAMINE (BENADRYL) injection 25 mg (25 mg Intravenous Given 1/1/23 2019)   ketorolac (TORADOL) injection 15 mg (15 mg Intravenous Given 1/1/23 2020)      Consultations/Discussion of Management or Tests:  2015 I obtained patient  history and examination.    Disposition:  The patient was discharged to home.     Impression & Plan    Medical Decision Making:  Patient presents to the ER for evaluation of nausea and vomiting and epigastric discomfort.  Vital signs reassuring.  He has mild abdominal tenderness.  No peritoneal signs.  Low suspicion for sinister or surgical intra-abdominal process.  After Reglan, Benadryl, Toradol, he was feeling much improved and desired to leave.  He tolerated p.o.  I reviewed the medical records and see that he has numerous previous visits for similar symptoms with negative abdominal imaging during those visits.  Mother in agreement to withhold abdominal imaging given resolution of symptoms with medications.  Return precautions discussed prior to discharge.      Diagnosis:    ICD-10-CM    1. Nausea and vomiting, unspecified vomiting type  R11.2       2. Abdominal pain, epigastric  R10.13            Discharge Medications:  New Prescriptions    No medications on file        1/1/2023   Chris Navarro MD Lindenbaum, Elan, MD  01/01/23 2736

## 2023-01-02 NOTE — TELEPHONE ENCOUNTER
What type of discharge? Emergency Department  Risk of Hospital admission or ED visit: 62%  Is a TCM episode required? No  When should the patient follow up with PCP? within 30 days of discharge.    Montserrat Forde RN  [facility]

## 2023-01-02 NOTE — ED NOTES
Bed: ED18  Expected date:   Expected time:   Means of arrival:   Comments:  Triage   Group Topic: BH Check-in/Symptom Rating    Date: 5/27/2022  Start Time: 0830  End Time: 0915  Facilitators: Shellie Jackson MS; Jn Hardin    Focus: Symptoms/goals  Number in attendance: 7  Pt was recruited for group but did not attend. Efforts to encourage participation in programming on the unit will continue.   Shellie Jackson, MS

## 2023-01-02 NOTE — ED TRIAGE NOTES
Pt presents to the ED with nausea and abd pain.      Triage Assessment     Row Name 01/02/23 1146       Triage Assessment (Adult)    Airway WDL WDL       Respiratory WDL    Respiratory WDL WDL       Skin Circulation/Temperature WDL    Skin Circulation/Temperature WDL WDL       Cardiac WDL    Cardiac WDL WDL       Peripheral/Neurovascular WDL    Peripheral Neurovascular WDL WDL       Cognitive/Neuro/Behavioral WDL    Cognitive/Neuro/Behavioral WDL WDL

## 2023-01-02 NOTE — ED NOTES
Rapid Assessment Note    History:   The patients mother states that for the past couple days the patient has had abdominal pain and diarrhea with blood in it. The patient has been soiling his pants. He was seen in the ED yesterday and given IV meds but had a restless night at home and needed to come back in today. He knows that he says pathophoresis and hemorrhoids. He did not get any imaging yesterday in the ED.    Exam:   ***    Plan of Care:   I evaluated the patient and developed an initial plan of care. I discussed this plan and explained that I, or one of my partners, would be returning to complete the evaluation.     I, Jesus Reardon, am serving as a scribe to document services personally performed by Trey Bradley J, MD based on my observations and the provider's statements to me.    11/5/2018  [employer]    Portions of this medical record were completed by a scribe. UPON MY REVIEW AND AUTHENTICATION BY ELECTRONIC SIGNATURE, this confirms (a) I performed the applicable clinical services, and (b) the record is accurate.

## 2023-01-02 NOTE — TELEPHONE ENCOUNTER
Patient Contact    Attempt # 1    Was call answered?  No.  Left message on voicemail with information to call me back.

## 2023-01-03 ENCOUNTER — TELEPHONE (OUTPATIENT)
Dept: INTERNAL MEDICINE | Facility: CLINIC | Age: 33
End: 2023-01-03

## 2023-01-03 NOTE — TELEPHONE ENCOUNTER
Reason for Call:  ER Follow up only wants PCP - gastroparesis    Patient requesting this type of appt: Chronic Diease Management/Medication/Follow-Up    Requested provider: PCP    Reason patient unable to be scheduled:No openings    When does patient want to be seen/preferred time: ASAP    Comments: PT in ER [facility] 1/1 and 1/2 due to gastroparesis    Could we send this information to you in [facility] or would you prefer to receive a phone call?:  Call    Call taken on 1/3/2023 at 9:18 AM by Danette Elaine

## 2023-01-03 NOTE — ED PROVIDER NOTES
History     Chief Complaint:  Abdominal Pain     33 y/o male with hx of autism presents with mom for continued abd pain with loose stools and vomiting.  No fevers.  Seen here last night for same.      No fevers  No noted trouble breathing  No cough  Urinating normally  Has blood with stools sometimes, known hemorrhoids    Cyclic vomiting syndrome  Anxiety  Developmental delay  Autism    PCP and GI established  Local residents  Pt lives with mom    No recent ABX or foreign travel     The history is provided by the patient, a parent and medical records. No  was used.      Independent Historian: No.  Mom provides most history.    Review of External Notes:   1/1/23: [facility]: Autism/Anxiety.  Abd pain. Vomiting. + constipation. GI thinks pt has gastroparesis. Labs obtained and unremarkable.  Medicated for nausea.  Felt stable for discharge.     ROS:  Review of Systems   Constitutional: Negative for fever.   Respiratory: Negative for shortness of breath.    Gastrointestinal: Positive for abdominal pain, diarrhea and vomiting.   Genitourinary: Negative for difficulty urinating.     Allergies:  Sulfa Drugs  Unknown [No Clinical Screening - See Comments]     Medications:    benzonatate (TESSALON) 200 MG capsule  cetirizine (ZYRTEC) 10 MG tablet  escitalopram (LEXAPRO) 20 MG tablet  hydrOXYzine (ATARAX) 25 MG tablet  levothyroxine (SYNTHROID/LEVOTHROID) 125 MCG tablet  LORazepam (ATIVAN) 0.5 MG tablet  metoclopramide (REGLAN) 10 MG tablet  omeprazole (PRILOSEC) 20 MG DR capsule  OXcarbazepine (TRILEPTAL) 300 MG tablet  OXcarbazepine (TRILEPTAL) 600 MG tablet  QUEtiapine (SEROQUEL) 25 MG tablet  QUEtiapine (SEROQUEL) 50 MG tablet  simethicone (MYLICON) 125 MG chewable tablet  SUMAtriptan (IMITREX) 20 MG/ACT nasal spray      Past Medical History:    Past Medical History:   Diagnosis Date     Anxiety      Autism      Autoimmune hypothyroidism 02/14/2017     Cyclic vomiting syndrome      Development  delay      Hyperlipidemia 2008     Mood disorder (H)      Past Surgical History:    Past Surgical History:   Procedure Laterality Date     NO HISTORY OF SURGERY        Family History:    family history includes Alcoholism in his father; Autism Spectrum Disorder in his brother; Cancer (age of onset: 50) in his father.    Social History:   reports that he has never smoked. He has never used smokeless tobacco. He reports that he does not drink alcohol and does not use drugs.  PCP: Bhaskar Gandara     Physical Exam     Patient Vitals for the past 24 hrs:   BP Temp Temp src Pulse Resp SpO2   01/02/23 1145 138/82 97  F (36.1  C) Temporal 68 18 97 %      Physical Exam  Vitals and nursing note reviewed.   Constitutional:       General: He is not in acute distress.     Appearance: He is not ill-appearing, toxic-appearing or diaphoretic.   HENT:      Head: Normocephalic.      Right Ear: External ear normal.      Left Ear: External ear normal.      Mouth/Throat:      Mouth: Mucous membranes are moist.   Eyes:      Conjunctiva/sclera: Conjunctivae normal.   Cardiovascular:      Rate and Rhythm: Normal rate and regular rhythm.      Pulses: Normal pulses.      Heart sounds: Normal heart sounds.   Pulmonary:      Effort: Pulmonary effort is normal. No respiratory distress.      Breath sounds: Normal breath sounds.   Abdominal:      General: There is no distension.      Palpations: Abdomen is soft.      Tenderness: There is no guarding or rebound.      Comments: Mild diffuse TTP.  Soft abd.     Genitourinary:     Comments: Mom at  as chaperone:  Testes normal lie, non-tender  No scrotal erythema or edema  No hernia  No penile discharge  Circumcised    Anal exam: no thrombosed external hemorrhoids. No induration or swelling or TTP at anus.   Musculoskeletal:         General: Normal range of motion.      Cervical back: Normal range of motion and neck supple. No rigidity.   Skin:     General: Skin is warm.      Capillary  Refill: Capillary refill takes less than 2 seconds.   Neurological:      Mental Status: He is alert.      Comments: Alert  BAL   Psychiatric:      Comments: Cooperative and directable.          Emergency Department Course     Imaging:  CT Abdomen Pelvis w Contrast   Final Result   IMPRESSION:    1.  No acute findings.      2.  Two small simple cysts left kidney. No follow-up is recommended.      3.  No significant changes since 06/20/2022.         Report per radiology    Laboratory:  Labs Ordered and Resulted from Time of ED Arrival to Time of ED Departure   COMPREHENSIVE METABOLIC PANEL - Normal       Result Value    Sodium 138      Potassium 3.7      Chloride 106      Carbon Dioxide (CO2) 23      Anion Gap 9      Urea Nitrogen 7      Creatinine 0.81      Calcium 9.8      Glucose 98      Alkaline Phosphatase 80      AST 25      ALT 44      Protein Total 8.1      Albumin 4.8      Bilirubin Total 0.8      GFR Estimate >90     LIPASE - Normal    Lipase 96     CBC WITH PLATELETS AND DIFFERENTIAL    WBC Count 7.9      RBC Count 5.29      Hemoglobin 15.6      Hematocrit 44.7      MCV 85      MCH 29.5      MCHC 34.9      RDW 11.9      Platelet Count 234      % Neutrophils 73      % Lymphocytes 19      % Monocytes 6      % Eosinophils 0      % Basophils 1      % Immature Granulocytes 1      NRBCs per 100 WBC 0      Absolute Neutrophils 5.9      Absolute Lymphocytes 1.5      Absolute Monocytes 0.4      Absolute Eosinophils 0.0      Absolute Basophils 0.0      Absolute Immature Granulocytes 0.0      Absolute NRBCs 0.0     ROUTINE UA WITH MICROSCOPIC REFLEX TO CULTURE      Emergency Department Course & Assessments:     Assessments/Consults:  1830: Exam.  Labs/CT noted obtained through PIT process. Discussed results and dispo plan.     Interventions:  Medications   metoclopramide (REGLAN) injection 10 mg (has no administration in time range)   diphenhydrAMINE (BENADRYL) injection 25 mg (has no administration in time range)    ketorolac (TORADOL) injection 15 mg (has no administration in time range)   ondansetron (ZOFRAN) injection 4 mg (4 mg Intravenous Given 23 1157)   0.9% sodium chloride BOLUS (0 mLs Intravenous Stopped 23 1548)   iopamidol (ISOVUE-370) solution 96 mL (96 mLs Intravenous Given 23 1717)   Saline Flush (68 mLs Intravenous Given 23 171)      Disposition:  The patient was discharged to home.     Impression & Plan      Medical Decision Makin y/o male with hx of autism presents with mom for continued abd pain with loose stools and vomiting.  No fevers.  Seen here last night for same.      Exam as above.  D/W pt and mom reassuring evaluation here with reassuring labs (no leukocytosis/penia, anemia, or abnl platelets; no electrolyte derangement and has normal renal/liver function and normal lipase).  We also discussed CT obtained today of A/P without acute process and that pt is felt stable for discharge as this is not suspected to be acute surgical emergency or worrisome bacterial intestinal infection (as no enteritis or colitis on exam and again or fevers, etc).  Testes normal lie, not suspected this is referred pain from torsion.  UA ordered from triage but pt circumcised, no fevers, denies change to urination, not felt this will  as not suspected this is UTI with V/D.  Discussed supportive care and ideal to see PCP and/or GI within the next week. Pt and mom educated on S/S that should prompt ED re-eval.  Questions answered. Verbalized understanding. Comfortable with plan and appreciative.     Diagnosis:    ICD-10-CM    1. Abdominal pain, generalized  R10.84          Discharge Medications:  New Prescriptions    No medications on file      2023   Mavis Antoine PA-C [employer], Leah M, PA-C  23

## 2023-01-04 ASSESSMENT — ANXIETY QUESTIONNAIRES
4. TROUBLE RELAXING: SEVERAL DAYS
5. BEING SO RESTLESS THAT IT IS HARD TO SIT STILL: SEVERAL DAYS
1. FEELING NERVOUS, ANXIOUS, OR ON EDGE: SEVERAL DAYS
IF YOU CHECKED OFF ANY PROBLEMS ON THIS QUESTIONNAIRE, HOW DIFFICULT HAVE THESE PROBLEMS MADE IT FOR YOU TO DO YOUR WORK, TAKE CARE OF THINGS AT HOME, OR GET ALONG WITH OTHER PEOPLE: SOMEWHAT DIFFICULT
3. WORRYING TOO MUCH ABOUT DIFFERENT THINGS: SEVERAL DAYS
GAD7 TOTAL SCORE: 7
7. FEELING AFRAID AS IF SOMETHING AWFUL MIGHT HAPPEN: SEVERAL DAYS
GAD7 TOTAL SCORE: 7
7. FEELING AFRAID AS IF SOMETHING AWFUL MIGHT HAPPEN: SEVERAL DAYS
6. BECOMING EASILY ANNOYED OR IRRITABLE: SEVERAL DAYS
8. IF YOU CHECKED OFF ANY PROBLEMS, HOW DIFFICULT HAVE THESE MADE IT FOR YOU TO DO YOUR WORK, TAKE CARE OF THINGS AT HOME, OR GET ALONG WITH OTHER PEOPLE?: SOMEWHAT DIFFICULT
GAD7 TOTAL SCORE: 7
2. NOT BEING ABLE TO STOP OR CONTROL WORRYING: SEVERAL DAYS

## 2023-01-04 ASSESSMENT — PATIENT HEALTH QUESTIONNAIRE - PHQ9
SUM OF ALL RESPONSES TO PHQ QUESTIONS 1-9: 11
SUM OF ALL RESPONSES TO PHQ QUESTIONS 1-9: 11
10. IF YOU CHECKED OFF ANY PROBLEMS, HOW DIFFICULT HAVE THESE PROBLEMS MADE IT FOR YOU TO DO YOUR WORK, TAKE CARE OF THINGS AT HOME, OR GET ALONG WITH OTHER PEOPLE: VERY DIFFICULT

## 2023-01-04 NOTE — TELEPHONE ENCOUNTER
"Please advise.   - Mother is requesting a UA to be completed this week (pt has 1/9 Hosp F/U appt) or to reschedule Hosp. F/U for this week if possible.       Mother reporting possible UTI symptoms, stating patient strains to urinate and says 'it hurts to go potty'. Pt is unable to sit still and is miserable.  Patient is repeatedly asking to go back to hospital, tells mother he is hot and then cold. Mother is not sure if patient is really feeling feverish and chills, or saying this so he will go back to hospital. Mother has not checked temp since ED visit and states patient has not felt hot to her. Patient is in background requesting to get into the clinic tomorrow. She has tried Colace (pt has hemorrhoids) in hopes pain is caused by constipation.     UA was not completed at 1/1 or 1/2 ED visit.            Hospital/TCU/ED for chronic condition Discharge Protocol    \"Hi, my name is Kaitlynn Mota RN, a registered nurse, and I am calling from [facility].  I am calling to follow up and see how things are going for you after your recent emergency visit/hospital/TCU stay.\"    Tell me how you are doing now that you are home?\" Mother reports patient is not improving and has developed urinary issues since ED visit.       Discharge Instructions    \"Let's review your discharge instructions.  What is/are the follow-up recommendations?  Pt. Response: See PCP and/or GI within the next week.     \"Has an appointment with your primary care provider been scheduled?\"   Yes. (confirm) 1/9/2023    \"When you see the provider, I would recommend that you bring your medications with you.\"    Medications    \"Tell me what changed about your medicines when you discharged?\"    Changes to chronic meds?    0-1    \"What questions do you have about your medications?\"    None     New diagnoses of heart failure, COPD, diabetes, or MI?    No              Post Discharge Medication Reconciliation Status: discharge medications " "reconciled, continue medications without change.    Was MTM referral placed (*Make sure to put transitions as reason for referral)?   No    Call Summary    \"What questions or concerns do you have about your recent visit and your follow-up care?\"     none    \"If you have questions or things don't continue to improve, we encourage you contact us through the main clinic number (give number).  Even if the clinic is not open, triage nurses are available 24/7 to help you.     We would like you to know that our clinic has extended hours (provide information).  We also have urgent care (provide details on closest location and hours/contact info)\"      \"Thank you for your time and take care!\"             "

## 2023-01-04 NOTE — TELEPHONE ENCOUNTER
I can see him tomorrow morning or Friday morning at 800 in the huddle spot if they want to be seen this week, otherwise see me on Monday.   We can check his urine when he is here.

## 2023-01-05 ENCOUNTER — OFFICE VISIT (OUTPATIENT)
Dept: INTERNAL MEDICINE | Facility: CLINIC | Age: 33
End: 2023-01-05
Payer: MEDICAID

## 2023-01-05 VITALS
DIASTOLIC BLOOD PRESSURE: 70 MMHG | SYSTOLIC BLOOD PRESSURE: 100 MMHG | BODY MASS INDEX: 22.69 KG/M2 | HEART RATE: 74 BPM | TEMPERATURE: 98.6 F | WEIGHT: 186.4 LBS | OXYGEN SATURATION: 99 %

## 2023-01-05 DIAGNOSIS — K59.00 CONSTIPATION, UNSPECIFIED CONSTIPATION TYPE: ICD-10-CM

## 2023-01-05 DIAGNOSIS — N41.0 ACUTE PROSTATITIS: Primary | ICD-10-CM

## 2023-01-05 DIAGNOSIS — K64.8 INTERNAL HEMORRHOID: ICD-10-CM

## 2023-01-05 DIAGNOSIS — F39 EPISODIC MOOD DISORDER (H): ICD-10-CM

## 2023-01-05 DIAGNOSIS — R30.0 DYSURIA: ICD-10-CM

## 2023-01-05 LAB
ALBUMIN UR-MCNC: ABNORMAL MG/DL
APPEARANCE UR: CLEAR
BACTERIA #/AREA URNS HPF: ABNORMAL /HPF
BILIRUB UR QL STRIP: ABNORMAL
COLOR UR AUTO: YELLOW
GLUCOSE UR STRIP-MCNC: NEGATIVE MG/DL
HGB UR QL STRIP: NEGATIVE
KETONES UR STRIP-MCNC: NEGATIVE MG/DL
LEUKOCYTE ESTERASE UR QL STRIP: NEGATIVE
MUCOUS THREADS #/AREA URNS LPF: PRESENT /LPF
NITRATE UR QL: NEGATIVE
PH UR STRIP: 6 [PH] (ref 5–7)
RBC #/AREA URNS AUTO: ABNORMAL /HPF
SP GR UR STRIP: >=1.03 (ref 1–1.03)
UROBILINOGEN UR STRIP-ACNC: 1 E.U./DL
WBC #/AREA URNS AUTO: ABNORMAL /HPF

## 2023-01-05 PROCEDURE — 81001 URINALYSIS AUTO W/SCOPE: CPT | Performed by: INTERNAL MEDICINE

## 2023-01-05 PROCEDURE — 90471 IMMUNIZATION ADMIN: CPT | Mod: SL | Performed by: INTERNAL MEDICINE

## 2023-01-05 PROCEDURE — 96127 BRIEF EMOTIONAL/BEHAV ASSMT: CPT | Performed by: INTERNAL MEDICINE

## 2023-01-05 PROCEDURE — 99214 OFFICE O/P EST MOD 30 MIN: CPT | Mod: 25 | Performed by: INTERNAL MEDICINE

## 2023-01-05 PROCEDURE — 90686 IIV4 VACC NO PRSV 0.5 ML IM: CPT | Performed by: INTERNAL MEDICINE

## 2023-01-05 RX ORDER — CIPROFLOXACIN 500 MG/1
500 TABLET, FILM COATED ORAL 2 TIMES DAILY
Qty: 28 TABLET | Refills: 0 | Status: SHIPPED | OUTPATIENT
Start: 2023-01-05 | End: 2023-01-19

## 2023-01-05 ASSESSMENT — PATIENT HEALTH QUESTIONNAIRE - PHQ9
SUM OF ALL RESPONSES TO PHQ QUESTIONS 1-9: 11
10. IF YOU CHECKED OFF ANY PROBLEMS, HOW DIFFICULT HAVE THESE PROBLEMS MADE IT FOR YOU TO DO YOUR WORK, TAKE CARE OF THINGS AT HOME, OR GET ALONG WITH OTHER PEOPLE: VERY DIFFICULT

## 2023-01-05 ASSESSMENT — ANXIETY QUESTIONNAIRES: GAD7 TOTAL SCORE: 7

## 2023-01-05 NOTE — PROGRESS NOTES
Assessment & Plan     (N41.0) Acute prostatitis  (primary encounter diagnosis)  Comment: Difficult determine patient's symptoms given his lack of communications abilities.  Urine negative for kidney or bladder  Problems, suspect he may have a low-grade prostate infection.  Normally would recommend sulfa based antibiotic is first-line treatment, however the patient's mother is adamant that he not receive any sulfa antibiotics because the patient's father had a very severe allergic reaction to sulfa.  We will use ciprofloxacin 500 mg twice a day for 14 days, reviewed side effects including  Tendinopathy and in rare cases of tendon rupture.  Plan: ciprofloxacin (CIPRO) 500 MG tablet            (R30.0) Dysuria  Comment:   Plan: UA with Microscopic reflex to Culture - Clinic         Collect, Urine Microscopic            (K64.8) Internal hemorrhoid  Comment: Continue fiber supplementation, continue stool softeners.  Follow-up with the gastroenterology clinic.  Plan:     (K59.00) Constipation, unspecified constipation type  Comment:   Plan:     (F39) Episodic mood disorder (H)  Comment: Known issue that I take into account for their medical decisions; no current exacerbations, or new concerns.  This condition is currently controlled on the current medical regimen.  Continue current therapy.   Plan: Continue regular follow-up with psychiatry              MED REC REQUIRED  Post Medication Reconciliation Status:       Depression Screening Follow Up    PHQ 1/4/2023   PHQ-9 Total Score 11   Q9: Thoughts of better off dead/self-harm past 2 weeks Not at all         Follow Up Actions Taken  Crisis resource information provided in After Visit Summary  Continue current psychiatric follow-up         No follow-ups on file.    Bhaskar Gandara MD  [facility]    Emma Rodrigues is a 32 year old, presenting for the following health issues:  ER F/U      History of Present Illness       Mental  Health Follow-up:  Patient presents to follow-up on Depression & Anxiety.Patient's depression since last visit has been:  Worse  The patient is not having other symptoms associated with depression.  Patient's anxiety since last visit has been:  Worse  The patient is not having other symptoms associated with anxiety.  Any significant life events: grief or loss and health concerns  Patient is not feeling anxious or having panic attacks.  Patient has no concerns about alcohol or drug use.    Reason for visit:  Follow up from ER visits.    He eats 2-3 servings of fruits and vegetables daily.He consumes 2 sweetened beverage(s) daily.He exercises with enough effort to increase his heart rate 20 to 29 minutes per day.  He exercises with enough effort to increase his heart rate 4 days per week.   He is taking medications regularly.    Today's PHQ-9         PHQ-9 Total Score: 11    PHQ-9 Q9 Thoughts of better off dead/self-harm past 2 weeks :   Not at all    How difficult have these problems made it for you to do your work, take care of things at home, or get along with other people: Very difficult  Today's SHAYNE-7 Score: 7     Mom describes some bright red blood per rectum recently.  Has a history of internal hemorrhoids.  Has been having some constipation lately.  Taking Colace intermittently at home.    Mom reports that he has had some difficulty with urination with frequency and some slight pain.  No blood in the urine.    Ongoing diltiazem with episodic mood disorder.  Followed by outside psychiatrist.    **I reviewed the information recorded in the patient's EPIC chart (including but not limited to medical history, surgical history, family history, problem list, medication list, and allergy list) and updated the information as indicated based on the patients reported information.       Results for orders placed or performed in visit on 01/05/23   UA with Microscopic reflex to Culture - Clinic Collect     Status: Abnormal     Specimen: Urine, Midstream   Result Value Ref Range    Color Urine Yellow Colorless, Straw, Light Yellow, Yellow    Appearance Urine Clear Clear    Glucose Urine Negative Negative mg/dL    Bilirubin Urine Small (A) Negative    Ketones Urine Negative Negative mg/dL    Specific Gravity Urine >=1.030 1.003 - 1.035    Blood Urine Negative Negative    pH Urine 6.0 5.0 - 7.0    Protein Albumin Urine Trace (A) Negative mg/dL    Urobilinogen Urine 1.0 0.2, 1.0 E.U./dL    Nitrite Urine Negative Negative    Leukocyte Esterase Urine Negative Negative   Urine Microscopic     Status: Abnormal   Result Value Ref Range    Bacteria Urine Moderate (A) None Seen /HPF    RBC Urine 0-2 0-2 /HPF /HPF    WBC Urine 0-5 0-5 /HPF /HPF    Mucus Urine Present (A) None Seen /LPF    Narrative    Urine Culture not indicated        Review of Systems   Constitutional, HEENT, cardiovascular, pulmonary, gi and gu systems are negative, except as otherwise noted.      Objective    /70   Pulse 74   Temp 98.6  F (37  C) (Tympanic)   Wt 84.6 kg (186 lb 6.4 oz)   SpO2 99%   BMI 22.69 kg/m    Body mass index is 22.69 kg/m .  Physical Exam   GENERAL alert and no distress; limited communication vocabulary due to severe autism, history predominantly obtained through the mother  EYES:  Normal sclera,conjunctiva, EOMI  HENT: oral and posterior pharynx without lesions or erythema, facies symmetric  NECK: Neck supple. No LAD, without thyroidmegaly.  RESP: Clear to ausculation bilaterally without wheezes or crackles. Normal BS in all fields.  CV: RRR normal S1S2 without murmurs, rubs or gallops.  LYMPH: no cervical lymph adenopathy appreciated  MS: extremities- no gross deformities of the visible extremities noted,   EXT:  no lower extremity edema  SKIN:  No obvious significant skin lesions on visible portions of face   RECTAL: Prostate normal in size and texture, but slightly softer than expected, no nodules, some discomfort with palpation of the  prostate.  No external hemorrhoid.

## 2023-01-13 ENCOUNTER — TRANSFERRED RECORDS (OUTPATIENT)
Dept: HEALTH INFORMATION MANAGEMENT | Facility: CLINIC | Age: 33
End: 2023-01-13
Payer: MEDICAID

## 2023-03-30 ENCOUNTER — TRANSFERRED RECORDS (OUTPATIENT)
Dept: HEALTH INFORMATION MANAGEMENT | Facility: CLINIC | Age: 33
End: 2023-03-30
Payer: MEDICAID

## 2023-05-05 NOTE — DISCHARGE SUMMARY
"[facility]  Hospitalist Discharge Summary      Date of Admission:  5/13/2020  Date of Discharge:  5/14/2020  Discharging Provider: Lillian Pedersen PA-C      Discharge Diagnoses   Cyclic vomiting syndrome, recurrent - improved  Abdominal pain, improved    Chronic stable diagnoses addressed this visit  Autism  Developmental delay  Mood disorder  Anxiety  Hypothyroidism    Follow-ups Needed After Discharge   Follow-up Appointments     Follow-up and recommended labs and tests       Follow up with primary care provider, Bhaskar Gandara, as needed.    No follow up labs or test are needed.             Discharge Disposition   Discharged to home with mother  Condition at discharge: Stable    Hospital Course   Ravi Dunne is a 29 year old male with a history of autism, developmental delay, mood disorder, anxiety, and hypothyroidism who presented to the ER with nausea and vomiting.  This is his fourth visit to the ER over the past 2 days for the same problem.  He was registered to observation for further evaluation and management.     Cyclic vomiting syndrome, recurrent - improving  4th visit to the ER in the past two days with complaints of \"bellyache\".  Had a noninfectious appearing UA as well as CT scan with contrast of abdomen and pelvis which was unrevealing other than a stable renal cyst.  Labs unremarkable. Last emesis day of admission. N/V relieved by abortive medication, sumatriptan SQ x1, for the cyclic vomiting syndrome. Bowel regimen for presumed constipation with 2 hard stools and belly ache, tolerated senokot and bisacodyl. Urinating frequently with IVF, PVR 18mL. - IV fluids until tolerating more PO, soup for lunch and had no bouts of emesis day of discharge. Mom reports he has enough antiemetics at home and did not need any medications prescribed on discharge.      Chronic stable diagnoses:  Autism  Developmental delay  Mood disorder  Anxiety  Hypothyroidism  - Continue PTA meds " Patient would like communication of their results via:    Cell Phone:   Telephone Information:   Mobile [phone]     Okay to leave a message containing results? Yes     Health Maintenance Due   Topic Date Due   • COVID-19 Vaccine (4 - Booster for Moderna series) 03/08/2022   • Pneumococcal Vaccine 0-64 (2 - PCV) 04/15/2023                  as able.    Consultations This Hospital Stay   None    Code Status   Full Code    Time Spent on this Encounter   I, Lillian Pedersen PA-C, personally saw the patient today and spent greater than 30 minutes discharging this patient.       Lillian Pedersen PA-C  [facility]  ______________________________________________________________________    Physical Exam   Vital Signs: Temp: 98.1  F (36.7  C) Temp src: Oral BP: (!) 142/86 Pulse: 86 Heart Rate: 71 Resp: 18 SpO2: 98 % O2 Device: None (Room air)    Weight: 220 lbs 0 oz          Data           Recent Labs   Lab 05/13/20  1124 05/12/20 2008   WBC 7.9 8.2   HGB 14.5 14.8   MCV 85 85    215    141   POTASSIUM 4.0 4.3   CHLORIDE 108 108   CO2 29 25   BUN 9 9   CR 1.03 1.01   ANIONGAP 4 8   YULIYA 9.3 9.1   * 102*   ALBUMIN  --  4.3   PROTTOTAL  --  7.7   BILITOTAL  --  0.9   ALKPHOS  --  78   ALT  --  41   AST  --  40   LIPASE  --  136     No results found for this or any previous visit (from the past 24 hour(s)).     Medications          cetirizine  10 mg Oral Daily     escitalopram  20 mg Oral At Bedtime     levothyroxine  125 mcg Oral QAM AC     omeprazole  20 mg Oral Daily     OXcarbazepine  900 mg Oral At Bedtime     QUEtiapine  25 mg Oral BID     QUEtiapine  50 mg Oral At Bedtime     sennosides  1-2 tablet Oral BID     sodium chloride (PF)  3 mL Intracatheter Q8H                        Primary Care Physician   Bhaskar Gandara    Discharge Orders      Reason for your hospital stay    Admitted with cyclic vomiting and bellyache. You improved with medications and IV fluids and were able to eat and go home.     Follow-up and recommended labs and tests     Follow up with primary care provider, Bhaskar Gandara, as needed.  No follow up labs or test are needed.     Activity    Your activity upon discharge: activity as tolerated     Diet    Follow this diet upon discharge: Orders Placed This Encounter      Full  Liquid Diet, advance as able at home       Significant Results and Procedures   Results for orders placed or performed during the hospital encounter of 05/12/20   CT Abdomen Pelvis w Contrast    Narrative    CT ABDOMEN PELVIS WITH CONTRAST 5/12/2020 9:45 PM    CLINICAL HISTORY: Abdominal pain and vomiting    TECHNIQUE: CT scan of the abdomen and pelvis was performed following  injection of IV contrast. Multiplanar reformats were obtained. Dose  reduction techniques were used.  CONTRAST: 111 mL Isovue-370    COMPARISON: 4/2/2013.    FINDINGS:   LOWER CHEST: Normal.    HEPATOBILIARY: Normal.    PANCREAS: Normal.    SPLEEN: Normal.    ADRENAL GLANDS: Normal.    KIDNEYS/BLADDER: Simple cyst in the mid left kidney is unchanged from  2013 and requires no further follow-up.    BOWEL: Normal appendix. No bowel obstruction or free intraperitoneal  air.    PELVIC ORGANS: Normal.    ADDITIONAL FINDINGS: None.    MUSCULOSKELETAL: Normal.      Impression    IMPRESSION:   1.  Unremarkable CT of the abdomen and pelvis. No cause for pain  demonstrated.    SARI DOYLE MD       Discharge Medications   Current Discharge Medication List      CONTINUE these medications which have NOT CHANGED    Details   cetirizine (ZYRTEC) 10 MG tablet Take 1 tablet (10 mg) by mouth daily  Qty: 90 tablet, Refills: 0    Comments: Medication is being filled for 1 time refill only due to:  Patient needs to be seen because it has been more than one year since last visit.  Associated Diagnoses: Seasonal allergic rhinitis, unspecified trigger      escitalopram (LEXAPRO) 20 MG tablet Take 1 tablet (20 mg) by mouth At Bedtime    Comments: Prescribed by psychiatrist at [facility] [place], [place] (Dr. Burr)  Associated Diagnoses: Anxiety; Autism      levothyroxine (SYNTHROID/LEVOTHROID) 125 MCG tablet Take 1 tablet (125 mcg) by mouth daily  Qty: 90 tablet, Refills: 0    Comments: OVERDUE FOR OFFICE VISIT  Associated Diagnoses: Autoimmune hypothyroidism      metoclopramide  (REGLAN) 5 MG tablet Take 1 tablet (5 mg) by mouth 3 times daily as needed (nausea/vomiting) As needed for nausea/vomiting  Qty: 15 tablet, Refills: 0      omeprazole (PRILOSEC) 20 MG DR capsule Take 1 capsule (20 mg) by mouth daily  Qty: 90 capsule, Refills: 0    Comments: OVERDUE FOR OFFICE VISIT  Associated Diagnoses: Intractable cyclical vomiting with nausea      ondansetron (ZOFRAN ODT) 4 MG ODT tab Take 1 tablet (4 mg) by mouth every 8 hours as needed for nausea  Qty: 10 tablet, Refills: 0      !! OXcarbazepine (TRILEPTAL) 300 MG tablet Take 300 mg by mouth At Bedtime Takes in addition to 600 mg tab = 900 mg total at bedtime    Comments: Prescribed by psychiatrist at [facility] [place] (Dr. Burr)  Associated Diagnoses: Autism; Anxiety      !! OXcarbazepine (TRILEPTAL) 600 MG tablet Take 600 mg by mouth At Bedtime Takes in addition to 300 mg tab = 900 mg total at bedtime      !! QUEtiapine (SEROQUEL) 25 MG tablet Take 25 mg by mouth 2 times daily At 3 pm and 6 pm    Comments: Prescribed by psychiatrist at [facility] [place] (Dr. Burr)  Associated Diagnoses: Autism; Anxiety      !! QUEtiapine (SEROQUEL) 50 MG tablet Take 50 mg by mouth At Bedtime     Comments: Prescribed by psychiatrist at [facility] [place] (Dr. Burr)  Associated Diagnoses: Autism; Anxiety       !! - Potential duplicate medications found. Please discuss with provider.        Allergies   Allergies   Allergen Reactions     No Clinical Screening - See Comments      rylee

## 2023-05-16 ENCOUNTER — TRANSFERRED RECORDS (OUTPATIENT)
Dept: HEALTH INFORMATION MANAGEMENT | Facility: CLINIC | Age: 33
End: 2023-05-16
Payer: MEDICAID

## 2023-05-17 ENCOUNTER — ANCILLARY PROCEDURE (OUTPATIENT)
Dept: GENERAL RADIOLOGY | Facility: CLINIC | Age: 33
End: 2023-05-17
Attending: INTERNAL MEDICINE
Payer: MEDICAID

## 2023-05-17 ENCOUNTER — OFFICE VISIT (OUTPATIENT)
Dept: INTERNAL MEDICINE | Facility: CLINIC | Age: 33
End: 2023-05-17
Payer: MEDICAID

## 2023-05-17 VITALS
SYSTOLIC BLOOD PRESSURE: 110 MMHG | WEIGHT: 185.2 LBS | DIASTOLIC BLOOD PRESSURE: 80 MMHG | BODY MASS INDEX: 22.54 KG/M2 | OXYGEN SATURATION: 97 % | TEMPERATURE: 98.4 F | HEART RATE: 87 BPM

## 2023-05-17 DIAGNOSIS — K59.00 CONSTIPATION, UNSPECIFIED CONSTIPATION TYPE: ICD-10-CM

## 2023-05-17 DIAGNOSIS — K64.8 INTERNAL HEMORRHOID, BLEEDING: Primary | ICD-10-CM

## 2023-05-17 DIAGNOSIS — K64.8 INTERNAL HEMORRHOID, BLEEDING: ICD-10-CM

## 2023-05-17 DIAGNOSIS — K62.89 ANAL OR RECTAL PAIN: ICD-10-CM

## 2023-05-17 PROCEDURE — 74019 RADEX ABDOMEN 2 VIEWS: CPT | Mod: TC | Performed by: RADIOLOGY

## 2023-05-17 PROCEDURE — 99214 OFFICE O/P EST MOD 30 MIN: CPT | Performed by: INTERNAL MEDICINE

## 2023-05-17 RX ORDER — SENNOSIDES A AND B 8.6 MG/1
1-2 TABLET, FILM COATED ORAL DAILY PRN
Qty: 60 TABLET | Refills: 5 | Status: SHIPPED | OUTPATIENT
Start: 2023-05-17 | End: 2024-08-20

## 2023-05-17 ASSESSMENT — PAIN SCALES - GENERAL: PAINLEVEL: NO PAIN (0)

## 2023-05-17 NOTE — PROGRESS NOTES
Assessment & Plan     (K64.8) Internal hemorrhoid, bleeding  (primary encounter diagnosis)  Comment: Discussed hemorrhoids in detail, including pathophysiology, their cause by difficult elimination. conservative treatments starting with increased water and dietary fiber (friuts/vegetables/bran/psyllium), conservative treatments such as Preparation H, Anusol, and even topical benzocaine oint.  Discussed point of intervention and referral to colorectal surgeons as indicated by symptoms.     Needs to resovle the constipation first, then return to see GI for hemorrhoid bading.   Plan: XR Abdomen 2 Views, senna (SENOKOT) 8.6 MG         tablet            (K62.89) Anal or rectal pain  Comment: fair amount of retained stool.   Plan: XR Abdomen 2 Views, senna (SENOKOT) 8.6 MG         tablet            (K59.00) Constipation, unspecified constipation type  Comment:   Plan: XR Abdomen 2 Views, senna (SENOKOT) 8.6 MG         tablet                 Normalizing the bowel movements making them easier to pass will help improve the hemorrhoids and rectal pain.        Take the Miralax powder, 1 capful every day (mixed in either Gatorade or Miralax).  Reduce the dose frequency of Miralax if needed if the stool become too loose.        Take a stool softener every day, like Senokot 2 tablets every day.        Stop both the senokot and Miralax if you develop regular diarrhea (more than 2 liquid stools per day)       Follow up with the gastroenterology clinic as planned.         Continue all other medications at the same doses.  Contact your usual pharmacy if you need refills.            Spoke at length with the patient ( difficult due to his Autism)         Bhaskar Gandara MD  [facility]    Emma Rodrigues is a 32 year old, presenting for the following health issues:  Gastrointestinal Problem (Pain, not eating x2 days) and Rectal Problem (Hemorrhoids )        5/17/2023    12:56 PM    Additional Questions   Roomed by Marcella BENTLEY CMA     History of Present Illness       Reason for visit:  Pain in stomach and hemorrhoids    He eats 2-3 servings of fruits and vegetables daily.He consumes 2 sweetened beverage(s) daily.He exercises with enough effort to increase his heart rate 30 to 60 minutes per day.  He exercises with enough effort to increase his heart rate 5 days per week.   He is taking medications regularly.     Not eating x2 days  Saw [facility] yesterday-had hemorrhoid banded  And requested GI X-ray also      **I reviewed the information recorded in the patient's EPIC chart (including but not limited to medical history, surgical history, family history, problem list, medication list, and allergy list) and updated the information as indicated based on the patients reported information.           Review of Systems   Constitutional, HEENT, cardiovascular, pulmonary, gi and gu systems are negative, except as otherwise noted.      Objective    /80   Pulse 87   Temp 98.4  F (36.9  C) (Oral)   Wt 84 kg (185 lb 3.2 oz)   SpO2 97%   BMI 22.54 kg/m    Body mass index is 22.54 kg/m .  Physical Exam   GENERAL alert and no distress  EYES:  Normal sclera,conjunctiva, EOMI  HENT: facies symmetric  MS: extremities- no gross deformities of the visible extremities noted,   PSYCH: Alert and oriented times 3; speech- coherent  SKIN:  No obvious significant skin lesions on visible portions of face   NEURO:  Normal facial movements.  Appears to move normally     Abd Xray:  Moderate amount of stool in colon, no obstruction

## 2023-05-17 NOTE — PATIENT INSTRUCTIONS
Normalizing the bowel movements making them easier to pass will help improve the hemorrhoids and rectal pain.      Take the Miralax powder, 1 capful every day (mixed in either Gatorade or Miralax).  Reduce the dose frequency of Miralax if needed if the stool become too loose.                Take a stool softener every day, like Senokot 2 tablets every day.            Stop both the senokot and Miralax if you develop regular diarrhea (more than 2 liquid stools per day)     Follow up with the gastroenterology clinic as planned.       Continue all other medications at the same doses.  Contact your usual pharmacy if you need refills.

## 2023-05-25 ENCOUNTER — TRANSFERRED RECORDS (OUTPATIENT)
Dept: HEALTH INFORMATION MANAGEMENT | Facility: CLINIC | Age: 33
End: 2023-05-25
Payer: MEDICAID

## 2023-05-27 ENCOUNTER — HEALTH MAINTENANCE LETTER (OUTPATIENT)
Age: 33
End: 2023-05-27

## 2023-06-09 DIAGNOSIS — Z79.899 HIGH RISK MEDICATIONS (NOT ANTICOAGULANTS) LONG-TERM USE: Primary | ICD-10-CM

## 2023-06-13 ENCOUNTER — LAB (OUTPATIENT)
Dept: LAB | Facility: CLINIC | Age: 33
End: 2023-06-13
Payer: MEDICAID

## 2023-06-13 DIAGNOSIS — Z79.899 HIGH RISK MEDICATIONS (NOT ANTICOAGULANTS) LONG-TERM USE: ICD-10-CM

## 2023-06-13 PROCEDURE — 80183 DRUG SCRN QUANT OXCARBAZEPIN: CPT | Mod: 90

## 2023-06-13 PROCEDURE — 36415 COLL VENOUS BLD VENIPUNCTURE: CPT

## 2023-06-13 PROCEDURE — 99000 SPECIMEN HANDLING OFFICE-LAB: CPT

## 2023-06-16 LAB — 10OH-CARBAZEPINE SERPL-MCNC: 11 UG/ML

## 2023-06-20 ENCOUNTER — TRANSFERRED RECORDS (OUTPATIENT)
Dept: HEALTH INFORMATION MANAGEMENT | Facility: CLINIC | Age: 33
End: 2023-06-20
Payer: MEDICAID

## 2023-07-10 DIAGNOSIS — E06.3 AUTOIMMUNE HYPOTHYROIDISM: ICD-10-CM

## 2023-07-10 DIAGNOSIS — J30.2 SEASONAL ALLERGIC RHINITIS, UNSPECIFIED TRIGGER: ICD-10-CM

## 2023-07-10 RX ORDER — CETIRIZINE HYDROCHLORIDE 10 MG/1
TABLET ORAL
Qty: 90 TABLET | Refills: 1 | Status: SHIPPED | OUTPATIENT
Start: 2023-07-10 | End: 2024-01-08

## 2023-07-10 RX ORDER — LEVOTHYROXINE SODIUM 125 UG/1
TABLET ORAL
Qty: 90 TABLET | Refills: 2 | Status: SHIPPED | OUTPATIENT
Start: 2023-07-10 | End: 2024-04-08

## 2023-07-23 DIAGNOSIS — R11.15 INTRACTABLE CYCLICAL VOMITING WITH NAUSEA: ICD-10-CM

## 2023-08-23 NOTE — ED NOTES
Pt unable to tolerate PO intake. Emesis x1 following water. MD aware and at bedside.    Spoke with pt and addressed questions regarding surgery tomorrow. Reviewed that he will receive a phone call this evening between 4-6pm with the arrival time for tomorrow morning's surgery.

## 2023-08-31 ENCOUNTER — TRANSFERRED RECORDS (OUTPATIENT)
Dept: HEALTH INFORMATION MANAGEMENT | Facility: CLINIC | Age: 33
End: 2023-08-31
Payer: MEDICAID

## 2023-10-04 ENCOUNTER — HOSPITAL ENCOUNTER (EMERGENCY)
Facility: CLINIC | Age: 33
Discharge: HOME | End: 2023-10-05
Attending: STUDENT IN AN ORGANIZED HEALTH CARE EDUCATION/TRAINING PROGRAM | Admitting: STUDENT IN AN ORGANIZED HEALTH CARE EDUCATION/TRAINING PROGRAM
Payer: MEDICAID

## 2023-10-04 ENCOUNTER — APPOINTMENT (OUTPATIENT)
Dept: ULTRASOUND IMAGING | Facility: CLINIC | Age: 33
End: 2023-10-04
Attending: STUDENT IN AN ORGANIZED HEALTH CARE EDUCATION/TRAINING PROGRAM
Payer: MEDICAID

## 2023-10-04 VITALS
OXYGEN SATURATION: 98 % | TEMPERATURE: 97.7 F | RESPIRATION RATE: 16 BRPM | HEART RATE: 73 BPM | DIASTOLIC BLOOD PRESSURE: 92 MMHG | SYSTOLIC BLOOD PRESSURE: 136 MMHG

## 2023-10-04 DIAGNOSIS — N43.3 HYDROCELE, LEFT: ICD-10-CM

## 2023-10-04 DIAGNOSIS — N50.3 EPIDIDYMAL CYST: ICD-10-CM

## 2023-10-04 DIAGNOSIS — R11.2 NAUSEA AND VOMITING, UNSPECIFIED VOMITING TYPE: Primary | ICD-10-CM

## 2023-10-04 LAB
ALBUMIN SERPL BCG-MCNC: 4.7 G/DL (ref 3.5–5.2)
ALP SERPL-CCNC: 76 U/L (ref 40–129)
ALT SERPL W P-5'-P-CCNC: 25 U/L (ref 0–70)
ANION GAP SERPL CALCULATED.3IONS-SCNC: 15 MMOL/L (ref 7–15)
AST SERPL W P-5'-P-CCNC: 20 U/L (ref 0–45)
BASO+EOS+MONOS # BLD AUTO: ABNORMAL 10*3/UL
BASO+EOS+MONOS NFR BLD AUTO: ABNORMAL %
BASOPHILS # BLD AUTO: 0 10E3/UL (ref 0–0.2)
BASOPHILS NFR BLD AUTO: 0 %
BILIRUB DIRECT SERPL-MCNC: <0.2 MG/DL (ref 0–0.3)
BILIRUB SERPL-MCNC: 0.7 MG/DL
BUN SERPL-MCNC: 11.7 MG/DL (ref 6–20)
CALCIUM SERPL-MCNC: 9.2 MG/DL (ref 8.6–10)
CHLORIDE SERPL-SCNC: 97 MMOL/L (ref 98–107)
CREAT SERPL-MCNC: 0.8 MG/DL (ref 0.67–1.17)
DEPRECATED HCO3 PLAS-SCNC: 22 MMOL/L (ref 22–29)
EGFRCR SERPLBLD CKD-EPI 2021: >90 ML/MIN/1.73M2
EOSINOPHIL # BLD AUTO: 0 10E3/UL (ref 0–0.7)
EOSINOPHIL NFR BLD AUTO: 0 %
ERYTHROCYTE [DISTWIDTH] IN BLOOD BY AUTOMATED COUNT: 11.5 % (ref 10–15)
GLUCOSE SERPL-MCNC: 103 MG/DL (ref 70–99)
HCT VFR BLD AUTO: 38.5 % (ref 40–53)
HGB BLD-MCNC: 13.8 G/DL (ref 13.3–17.7)
IMM GRANULOCYTES # BLD: 0 10E3/UL
IMM GRANULOCYTES NFR BLD: 0 %
LIPASE SERPL-CCNC: 24 U/L (ref 13–60)
LYMPHOCYTES # BLD AUTO: 1.3 10E3/UL (ref 0.8–5.3)
LYMPHOCYTES NFR BLD AUTO: 11 %
MCH RBC QN AUTO: 29.3 PG (ref 26.5–33)
MCHC RBC AUTO-ENTMCNC: 35.8 G/DL (ref 31.5–36.5)
MCV RBC AUTO: 82 FL (ref 78–100)
MONOCYTES # BLD AUTO: 0.4 10E3/UL (ref 0–1.3)
MONOCYTES NFR BLD AUTO: 3 %
NEUTROPHILS # BLD AUTO: 9.6 10E3/UL (ref 1.6–8.3)
NEUTROPHILS NFR BLD AUTO: 86 %
NRBC # BLD AUTO: 0 10E3/UL
NRBC BLD AUTO-RTO: 0 /100
PLATELET # BLD AUTO: 180 10E3/UL (ref 150–450)
POTASSIUM SERPL-SCNC: 3.7 MMOL/L (ref 3.4–5.3)
PROT SERPL-MCNC: 7.5 G/DL (ref 6.4–8.3)
RBC # BLD AUTO: 4.71 10E6/UL (ref 4.4–5.9)
SODIUM SERPL-SCNC: 134 MMOL/L (ref 135–145)
WBC # BLD AUTO: 11.3 10E3/UL (ref 4–11)

## 2023-10-04 PROCEDURE — 258N000003 HC RX IP 258 OP 636: Performed by: STUDENT IN AN ORGANIZED HEALTH CARE EDUCATION/TRAINING PROGRAM

## 2023-10-04 PROCEDURE — 80048 BASIC METABOLIC PNL TOTAL CA: CPT | Performed by: STUDENT IN AN ORGANIZED HEALTH CARE EDUCATION/TRAINING PROGRAM

## 2023-10-04 PROCEDURE — 96374 THER/PROPH/DIAG INJ IV PUSH: CPT

## 2023-10-04 PROCEDURE — 99285 EMERGENCY DEPT VISIT HI MDM: CPT | Mod: 25

## 2023-10-04 PROCEDURE — 250N000011 HC RX IP 250 OP 636: Mod: JZ | Performed by: EMERGENCY MEDICINE

## 2023-10-04 PROCEDURE — 82248 BILIRUBIN DIRECT: CPT | Performed by: STUDENT IN AN ORGANIZED HEALTH CARE EDUCATION/TRAINING PROGRAM

## 2023-10-04 PROCEDURE — 80053 COMPREHEN METABOLIC PANEL: CPT | Performed by: EMERGENCY MEDICINE

## 2023-10-04 PROCEDURE — 96375 TX/PRO/DX INJ NEW DRUG ADDON: CPT

## 2023-10-04 PROCEDURE — 250N000011 HC RX IP 250 OP 636: Mod: JZ | Performed by: STUDENT IN AN ORGANIZED HEALTH CARE EDUCATION/TRAINING PROGRAM

## 2023-10-04 PROCEDURE — 258N000003 HC RX IP 258 OP 636: Performed by: EMERGENCY MEDICINE

## 2023-10-04 PROCEDURE — 85025 COMPLETE CBC W/AUTO DIFF WBC: CPT | Performed by: STUDENT IN AN ORGANIZED HEALTH CARE EDUCATION/TRAINING PROGRAM

## 2023-10-04 PROCEDURE — 76870 US EXAM SCROTUM: CPT

## 2023-10-04 PROCEDURE — 96361 HYDRATE IV INFUSION ADD-ON: CPT

## 2023-10-04 PROCEDURE — 83690 ASSAY OF LIPASE: CPT | Performed by: STUDENT IN AN ORGANIZED HEALTH CARE EDUCATION/TRAINING PROGRAM

## 2023-10-04 PROCEDURE — 36415 COLL VENOUS BLD VENIPUNCTURE: CPT | Performed by: EMERGENCY MEDICINE

## 2023-10-04 PROCEDURE — 85025 COMPLETE CBC W/AUTO DIFF WBC: CPT | Performed by: EMERGENCY MEDICINE

## 2023-10-04 RX ORDER — KETOROLAC TROMETHAMINE 15 MG/ML
15 INJECTION, SOLUTION INTRAMUSCULAR; INTRAVENOUS ONCE
Status: COMPLETED | OUTPATIENT
Start: 2023-10-04 | End: 2023-10-04

## 2023-10-04 RX ORDER — DIPHENHYDRAMINE HYDROCHLORIDE 50 MG/ML
25 INJECTION INTRAMUSCULAR; INTRAVENOUS ONCE
Status: COMPLETED | OUTPATIENT
Start: 2023-10-04 | End: 2023-10-04

## 2023-10-04 RX ORDER — METOCLOPRAMIDE HYDROCHLORIDE 5 MG/ML
10 INJECTION INTRAMUSCULAR; INTRAVENOUS ONCE
Status: COMPLETED | OUTPATIENT
Start: 2023-10-04 | End: 2023-10-04

## 2023-10-04 RX ORDER — ONDANSETRON 2 MG/ML
4 INJECTION INTRAMUSCULAR; INTRAVENOUS
Status: COMPLETED | OUTPATIENT
Start: 2023-10-04 | End: 2023-10-04

## 2023-10-04 RX ADMIN — KETOROLAC TROMETHAMINE 15 MG: 15 INJECTION, SOLUTION INTRAMUSCULAR; INTRAVENOUS at 23:09

## 2023-10-04 RX ADMIN — SODIUM CHLORIDE 500 ML: 9 INJECTION, SOLUTION INTRAVENOUS at 22:21

## 2023-10-04 RX ADMIN — DIPHENHYDRAMINE HYDROCHLORIDE 25 MG: 50 INJECTION, SOLUTION INTRAMUSCULAR; INTRAVENOUS at 22:59

## 2023-10-04 RX ADMIN — ONDANSETRON 4 MG: 2 INJECTION INTRAMUSCULAR; INTRAVENOUS at 22:19

## 2023-10-04 RX ADMIN — SODIUM CHLORIDE 500 ML: 9 INJECTION, SOLUTION INTRAVENOUS at 22:59

## 2023-10-04 RX ADMIN — METOCLOPRAMIDE 10 MG: 5 INJECTION, SOLUTION INTRAMUSCULAR; INTRAVENOUS at 23:09

## 2023-10-04 ASSESSMENT — ACTIVITIES OF DAILY LIVING (ADL): ADLS_ACUITY_SCORE: 35

## 2023-10-05 ENCOUNTER — PATIENT OUTREACH (OUTPATIENT)
Dept: INTERNAL MEDICINE | Facility: CLINIC | Age: 33
End: 2023-10-05
Payer: MEDICAID

## 2023-10-05 ASSESSMENT — ACTIVITIES OF DAILY LIVING (ADL): ADLS_ACUITY_SCORE: 35

## 2023-10-05 NOTE — ED TRIAGE NOTES
Patient presents with abdominal pain and vomiting.  Mother reports pain started a few days ago and today he started vomiting.  He has hx of gastroparesis and mother reports symptoms are similar to past episodes of gastroparesis.  Patient is autistic and is unable to self report symptoms.     Triage Assessment       Row Name 10/04/23 1937       Triage Assessment (Adult)    Airway WDL WDL       Respiratory WDL    Respiratory WDL WDL       Skin Circulation/Temperature WDL    Skin Circulation/Temperature WDL WDL       Cardiac WDL    Cardiac WDL WDL       Peripheral/Neurovascular WDL    Peripheral Neurovascular WDL WDL       Cognitive/Neuro/Behavioral WDL    Cognitive/Neuro/Behavioral WDL WDL

## 2023-10-05 NOTE — TELEPHONE ENCOUNTER
Pt's mother called back. She is requesting in clinic appt with PCP. Does provider approve use of same day appt 10/11/2023 at 1:00 PM?    If appt is approved, please schedule OV and let mother know. Ok to leave a detailed voice message.

## 2023-10-05 NOTE — TELEPHONE ENCOUNTER
"Hospital/TCU/ED for chronic condition Discharge Protocol    \"Hi, my name is Arsen Bowen RN, a registered nurse, and I am calling from [facility].  I am calling to follow up and see how things are going for you after your recent emergency visit/hospital/TCU stay.\"    Tell me how you are doing now that you are home?\" Pt has been eating today. No vomiting at this time.      Discharge Instructions    \"Let's review your discharge instructions.  What is/are the follow-up recommendations?  Pt. Response: follow up with PCP and urology    \"Has an appointment with your primary care provider been scheduled?\"   No (schedule appointment)    \"When you see the provider, I would recommend that you bring your medications with you.\"    Medications    \"Tell me what changed about your medicines when you discharged?\"    Changes to chronic meds?    0-1    \"What questions do you have about your medications?\"    None     New diagnoses of heart failure, COPD, diabetes, or MI?    No              Post Discharge Medication Reconciliation Status: discharge medications reconciled, continue medications without change.    Was MTM referral placed (*Make sure to put transitions as reason for referral)?   No    Call Summary    \"What questions or concerns do you have about your recent visit and your follow-up care?\"     none    \"If you have questions or things don't continue to improve, we encourage you contact us through the main clinic number (give number).  Even if the clinic is not open, triage nurses are available 24/7 to help you.     We would like you to know that our clinic has extended hours (provide information).  We also have urgent care (provide details on closest location and hours/contact info)\"      \"Thank you for your time and take care!\"           "

## 2023-10-05 NOTE — TELEPHONE ENCOUNTER
What type of discharge? Emergency Department  Risk of Hospital admission or ED visit: 64.4%  Is a TCM episode required? No  When should the patient follow up with PCP? No longer offering.     Pratibha Portillo RN  [facility]

## 2023-10-05 NOTE — ED PROVIDER NOTES
History     Chief Complaint:  Abdominal Pain       HPI   Ravi Dunne is a 32 year old male who presents with abdominal pain. The patient's mother reports that the patient has had lower abdominal pain that started a few days ago as well as repetitive vomiting that started today. She explains that the patient has a history of gastroparesis that this vomiting seems similar to. The patient adds that his abdominal pain shoots down into the testicles. He denies hematemesis, fever, and testicular swelling.      Independent Historian:   None - Patient Only    Review of External Notes:   None       Medications:    Zyrtec  Lexapro  Levothyroxine  Ativan  Reglan  Prilosec  Trileptal  Seroquel  Senokot  Imitrex  Atarax    Past Medical History:    Anxiety  Autism  Autoimmune hypothyroidism  Hyperlipidemia  Mood disorder  Cyclic vomiting syndrome  Gastroparesis  External hemorrhoids  Gastrointestinal hemorrhage    Physical Exam   Patient Vitals for the past 24 hrs:   BP Temp Temp src Pulse Resp SpO2   10/04/23 2200 (!) 136/92 97.7  F (36.5  C) Temporal 73 16 98 %        Physical Exam  General: Awake, alert, in no acute distress   HEENT: Atraumatic   EOM normal   External ears normal   Trachea midline  Neck: Supple, normal ROM  CV: Regular rate and rhythm   No murmur   No lower extremity edema  PULM: Breath sounds normal bilaterally. No wheezes or rales  ABD: Soft, non-distended. Normal bowel sounds   No rebound or guarding. Mild suprapubic tenderness  : Mild left testicular tenderness with normal cremasteric reflex bilaterally  MSK: No gross deformities  NEURO: Alert, no focal deficits  Skin: Warm, dry and intact    Emergency Department Course     Imaging:  US Testicular & Scrotum w Doppler Ltd   Final Result   IMPRESSION:   1.  Small left hydrocele.   2.  Flow documented bilaterally.         Report per radiology    Laboratory:  Labs Ordered and Resulted from Time of ED Arrival to Time of ED Departure   BASIC METABOLIC  PANEL - Abnormal       Result Value    Sodium 134 (*)     Potassium 3.7      Chloride 97 (*)     Carbon Dioxide (CO2) 22      Anion Gap 15      Urea Nitrogen 11.7      Creatinine 0.80      GFR Estimate >90      Calcium 9.2      Glucose 103 (*)    CBC WITH PLATELETS AND DIFFERENTIAL - Abnormal    WBC Count 11.3 (*)     RBC Count 4.71      Hemoglobin 13.8      Hematocrit 38.5 (*)     MCV 82      MCH 29.3      MCHC 35.8      RDW 11.5      Platelet Count 180      % Neutrophils 86      % Lymphocytes 11      % Monocytes 3      Mids % (Monos, Eos, Basos)        % Eosinophils 0      % Basophils 0      % Immature Granulocytes 0      NRBCs per 100 WBC 0      Absolute Neutrophils 9.6 (*)     Absolute Lymphocytes 1.3      Absolute Monocytes 0.4      Mids Abs (Monos, Eos, Basos)        Absolute Eosinophils 0.0      Absolute Basophils 0.0      Absolute Immature Granulocytes 0.0      Absolute NRBCs 0.0     LIPASE - Normal    Lipase 24     HEPATIC FUNCTION PANEL - Normal    Protein Total 7.5      Albumin 4.7      Bilirubin Total 0.7      Alkaline Phosphatase 76      AST 20      ALT 25      Bilirubin Direct <0.20     ROUTINE UA WITH MICROSCOPIC REFLEX TO CULTURE        Emergency Department Course & Assessments:      Interventions:  Medications   ondansetron (ZOFRAN) injection 4 mg (4 mg Intravenous $Given 10/4/23 2219)   sodium chloride 0.9% BOLUS 500 mL (0 mLs Intravenous Stopped 10/4/23 2252)   ketorolac (TORADOL) injection 15 mg (15 mg Intravenous $Given 10/4/23 2309)   metoclopramide (REGLAN) injection 10 mg (10 mg Intravenous $Given 10/4/23 2309)   diphenhydrAMINE (BENADRYL) injection 25 mg (25 mg Intravenous $Given 10/4/23 2259)   sodium chloride 0.9% BOLUS 500 mL (0 mLs Intravenous Stopped 10/5/23 0024)        Assessments:  ED Course as of 10/05/23 0119   Wed Oct 04, 2023   2238 I obtained history and examined the patient as noted above       Independent Interpretation (X-rays, CTs, rhythm  strip):  None    Consultations/Discussion of Management or Tests:  None     Social Determinants of Health affecting care:   None    Disposition:  The patient was discharged to home.     Impression & Plan      Medical Decision Making:  Vital stopping on arrival  This is a patient with long history of gastroparesis who presents with abdominal pain, nausea, 1 episode of nonbloody vomiting as above  His mother at bedside states whenever this happens he gets a cocktail of medications in the ER and is greatly improved  She did not give any Reglan at home  Patient did complain of testicular pain.  Exam is reassuring although will obtain ultrasound to rule out other etiology  Patient has no urinary symptoms  Fortunately, ultrasound does not show evidence of torsion.  He has small epididymal cysts bilaterally and a small left hydrocele.  Will give urology number in case he develops worsening pain although do not expect him to have much from these findings along  His labs are relatively  reassuring as well.  He has a mild leukocytosis which I suspect is reactive secondary to his vomiting prior to arrival  He received cocktail of Toradol, Zofran, Benadryl, Reglan with significant improvement in his abdominal tenderness  He is tolerating p.o.  At this time, is safe for discharge home.  Should follow-up with his PCP.  Discussed with mother it is okay to give Reglan at home.  She has only been giving as needed      Diagnosis:    ICD-10-CM    1. Nausea and vomiting, unspecified vomiting type  R11.2       2. Epididymal cyst  N50.3       3. Hydrocele, left  N43.3            Scribe Disclosure:  I, Soren Hays, am serving as a scribe at 10:23 PM on 10/4/2023 to document services personally performed by Jessica Mclaughlin DO based on my observations and the provider's statements to me.   10/4/2023   Jessica Mclaughlin DO Pappas Richter, Ellen, DO  10/05/23 0119

## 2023-10-05 NOTE — TELEPHONE ENCOUNTER
Attempted to reach the parent/gaurdian of pt to perform hospital follow up questions. No answer. Left VM to call us back.     On callback- please perform hospital follow up questions.       Patient Contact    Attempt # 1    Was call answered?  No.  Left message on voicemail with information to call me back.

## 2023-10-09 NOTE — TELEPHONE ENCOUNTER
Patient Contact    Attempt # 1    Was call answered?  No.  Left message on voicemail with information to call me back.    Upon call back, please relay provider message below and assist with scheduling if appropriate.     Thank you,  Pratibha Portillo RN

## 2023-10-09 NOTE — TELEPHONE ENCOUNTER
I reviewed the information from the ER.   If he has recovered completely from this most recent episode, I really probably do not need to see him since this episode does not appear much different than any of his prior episodes.    If he continues to have the scrotal/genital pain, then he can see Urology, but that too may not be needed.  The scrotal ultrasound did not show any concerning findings.  The small hydrocele seen is nothing to worry about, they are common findings and do not cause pain.      If they insist on being seen, then OK to use the same day appointment on 10/11.     Close encounter when done.

## 2023-10-09 NOTE — TELEPHONE ENCOUNTER
Called and spoke with pt's mother to relay provider message below.   They would still like him to be seen. She couldn't confirm or deny if the pt is having pain still exactly, but he is very anxious about the issue and would like to speak with his PCP.     Same-day appt no longer available. Scheduled pt for VV on Wednesday instead, mother will be present.   Oct 11, 2023 11:30 AM  (Arrive by 11:15 AM)  ED/Hospital Follow Up with Bhaskar Gandara MD  [facility] ([facility] ) [phone]       Thank you,  Pratibha Portillo RN

## 2023-10-09 NOTE — TELEPHONE ENCOUNTER
Please call back mother, as she is too impatient to wait longer than 15mins on hold for a triage nurse.

## 2023-10-11 ENCOUNTER — VIRTUAL VISIT (OUTPATIENT)
Dept: INTERNAL MEDICINE | Facility: CLINIC | Age: 33
End: 2023-10-11
Payer: MEDICAID

## 2023-10-11 DIAGNOSIS — E06.3 AUTOIMMUNE HYPOTHYROIDISM: ICD-10-CM

## 2023-10-11 DIAGNOSIS — N43.3 HYDROCELE, LEFT: ICD-10-CM

## 2023-10-11 DIAGNOSIS — F39 EPISODIC MOOD DISORDER (H): ICD-10-CM

## 2023-10-11 DIAGNOSIS — R11.15 CYCLIC VOMITING SYNDROME: Primary | ICD-10-CM

## 2023-10-11 PROCEDURE — 99214 OFFICE O/P EST MOD 30 MIN: CPT | Mod: VID | Performed by: INTERNAL MEDICINE

## 2023-10-11 NOTE — PROGRESS NOTES
"Ravi is a 32 year old who is being evaluated via a billable video visit.      How would you like to obtain your AVS? MyChart  If the video visit is dropped, the invitation should be resent by: Text to cell phone: [phone]  Will anyone else be joining your video visit? Mom-Sol-Guardian          Assessment & Plan     (R11.15) Cyclic vomiting syndrome  (primary encounter diagnosis)  Comment: Another episode of acute vomiting.  Potential vitamin triggered by multiple meals of spaghetti and Posta sauce.  Symptoms have completely resolved.  Use Reglan/metoclopramide as needed before larger meals or before any meals that may potentially cause acute vomiting.    Plan:     (N43.3) Hydrocele, left  Comment: Incidental small left hydrocele seen on scrotal ultrasound.  Reviewed hydroceles with the patient and his mother.  This is small and not causing any current symptoms, this can be monitored.  They can see urology if they would like to but probably not necessary at this time.  Plan:     (F39) Episodic mood disorder (H24)  Comment: This condition is currently controlled on the current medical regimen.  Continue current therapy.   Plan:     (E06.3) Autoimmune hypothyroidism  Comment: This condition is currently controlled on the current medical regimen.  Continue current therapy.   Plan:        Covid vaccines are now recommended annually for all adults.  The most recent updated Covid vaccine has been approved and is now widely available at any pharmacy or vaccine clinic location.  You may receive the COVID-vaccine at the same time as the annual influenza vaccine if desired.  Please get the annual influenza vaccine (\"flu shot\") when it is available this fall.  Ideally you should wait until early October for the longest lasting protection throughout the winter.  Make sure you have the influenza vaccine by middle October so you are ready to be protected by November 1, when the flu season typically starts.    You can get " "this from almost any pharmacy or our clinic location.  Check our web site or [employer] page for details about special \"flu shot clinics\" that we usually run in late September/October.       Return to see me in 6 months, sooner if needed.  Use [employer] or Call [phone] to schedule the appointment with me.        MED REC REQUIRED  Post Medication Reconciliation Status: discharge medications reconciled, continue medications without change      Bhaskar Gandara MD  [facility] JOBY Rodrigues is a 32 year old, presenting for the following health issues:  Hospital F/U        10/11/2023     9:42 AM   Additional Questions   Roomed by Marcella BENTLEY CMA     [facility]     ED/UC Followup:    Facility:  [facility]  Date of visit: 10/4/23  Reason for visit: abdominal pain-N&V  Current Status: been giving him Reglan BID-helps    Reviewed all available labs, documents, and imaging available from [facility].         Another episode of acute vomiting similar to previous episodes in the past.  Has seen gastroenterology multiple times.    Labs showed trivial leukocytosis and hyponatremia but otherwise normal.    Patient complained of mild scrotal pain, an ultrasound was performed showing a small left hydrocele but otherwise no abnormal findings.    This episode may have been triggered by 3 meals in a row with Pasta and Pasta sauce.    Currently bowels are formed, solid, once per day.  He has no nausea no vomiting no dysphagia.    His mood has been good.  No noticeable changes in his baseline behaviors.      Internal hemorrhoid banded last month.  No recurrences since that time.  They been working on bulking up the stool with fiber and drinking of water.      **I reviewed the information recorded in the patient's EPIC chart (including but not limited to medical history, surgical history, family history, problem list, medication list, and allergy list) and updated the " "information as indicated based on the patients reported information.         Review of Systems   Constitutional, HEENT, cardiovascular, pulmonary, gi and gu systems are negative, except as otherwise noted.      Objective    Vitals - Patient Reported  Weight (Patient Reported): 86.2 kg (190 lb)  Height (Patient Reported): 193 cm (6' 4\")  BMI (Based on Pt Reported Ht/Wt): 23.13  Pain Score: No Pain (0)      Vitals:  No vitals were obtained today due to virtual visit.    Physical Exam   GENERAL: Healthy, alert and no distress  EYES: Eyes grossly normal to inspection.  No discharge or erythema, or obvious scleral/conjunctival abnormalities.  RESP: No audible wheeze, cough, or visible cyanosis.  No visible retractions or increased work of breathing.    SKIN: Visible skin clear. No significant rash, abnormal pigmentation or lesions.  NEURO: Cranial nerves grossly intact.  Mentation and speech appropriate for age.  PSYCH: Mentation appears normal, affect normal/bright, judgement and insight intact, normal speech and appearance well-groomed.                Video-Visit Details    Type of service:  Video Visit   Video Start Time: 11:41 AM  Video End Time:11:53 AM    Originating Location (pt. Location): Home    Distant Location (provider location):  On-site  Platform used for Video Visit: Flo      "

## 2024-01-06 DIAGNOSIS — J30.2 SEASONAL ALLERGIC RHINITIS, UNSPECIFIED TRIGGER: ICD-10-CM

## 2024-01-08 RX ORDER — CETIRIZINE HYDROCHLORIDE 10 MG/1
TABLET ORAL
Qty: 90 TABLET | Refills: 1 | Status: SHIPPED | OUTPATIENT
Start: 2024-01-08 | End: 2024-07-05

## 2024-01-08 NOTE — TELEPHONE ENCOUNTER
Prescription approved per [facility] Refill Protocol.  Gypsy Mars RN  [facility]

## 2024-01-16 ENCOUNTER — TRANSFERRED RECORDS (OUTPATIENT)
Dept: HEALTH INFORMATION MANAGEMENT | Facility: CLINIC | Age: 34
End: 2024-01-16
Payer: MEDICAID

## 2024-04-05 DIAGNOSIS — E06.3 AUTOIMMUNE HYPOTHYROIDISM: ICD-10-CM

## 2024-04-08 RX ORDER — LEVOTHYROXINE SODIUM 125 UG/1
TABLET ORAL
Qty: 90 TABLET | Refills: 2 | Status: SHIPPED | OUTPATIENT
Start: 2024-04-08

## 2024-04-22 NOTE — PROGRESS NOTES
Clinic Care Coordination Contact    Situation: Patient chart reviewed by care coordinator.    Background: Patient identified as candidate for Care Coordination outreach based on discharge report; 2+ ED visits in last month.    Assessment: Per chart review, patient currently admitted at [facility] for nausea and vomiting; potential discharge in 1-2 days.    Plan/Recommendations: CC RN will review patient's chart again in approximately 1-2 business days to get updates on patient's status and outreach to patient/family following discharge.    Jaime Walter RN  Clinic Care Coordinator  [facility]  Ph: [phone]   Negative except as listed in HPI

## 2024-04-24 ENCOUNTER — TRANSFERRED RECORDS (OUTPATIENT)
Dept: HEALTH INFORMATION MANAGEMENT | Facility: CLINIC | Age: 34
End: 2024-04-24
Payer: MEDICAID

## 2024-05-02 DIAGNOSIS — F41.3 OTHER MIXED ANXIETY DISORDERS: Primary | ICD-10-CM

## 2024-05-07 ENCOUNTER — LAB (OUTPATIENT)
Dept: LAB | Facility: CLINIC | Age: 34
End: 2024-05-07
Payer: MEDICAID

## 2024-05-07 DIAGNOSIS — F41.3 OTHER MIXED ANXIETY DISORDERS: ICD-10-CM

## 2024-05-07 PROCEDURE — 80183 DRUG SCRN QUANT OXCARBAZEPIN: CPT | Mod: 90

## 2024-05-07 PROCEDURE — 99000 SPECIMEN HANDLING OFFICE-LAB: CPT

## 2024-05-07 PROCEDURE — 36415 COLL VENOUS BLD VENIPUNCTURE: CPT

## 2024-05-11 LAB — 10OH-CARBAZEPINE SERPL-MCNC: 5 UG/ML

## 2024-06-13 ENCOUNTER — NURSE TRIAGE (OUTPATIENT)
Dept: INTERNAL MEDICINE | Facility: CLINIC | Age: 34
End: 2024-06-13
Payer: MEDICAID

## 2024-06-13 NOTE — TELEPHONE ENCOUNTER
Reason for Call:  Appointment Request    Patient requesting this type of appt:  hemmorids and swelling   constipation     Requested provider: Bhaskar Gandara    Reason patient unable to be scheduled: Not within requested timeframe    When does patient want to be seen/preferred time:  ASAP    Comments: NA    Could we send this information to you in [place] or would you prefer to receive a phone call?:   Patient would prefer a phone call   Okay to leave a detailed message?: Yes at Cell number on file:    Telephone Information:   Mobile [phone]       Call taken on 6/13/2024 at 2:33 PM by Kristian Simpson

## 2024-06-14 NOTE — TELEPHONE ENCOUNTER
"Mother reports patient is having rectal swelling for past couple days. \"It doesn't look normal and it doesn't look good\".    Patient is constipated and agreed to mother administering enema a couple days ago. She was unable to get the enema completely in and that's when she noticed the rectal swelling. Patient has been pushing and straining to pass BMs.     She is not sure how often patient has bowel movements. Patient reported he was able to pass a BM after enema, mother is not sure if patient was telling the truth or how much BM was passed.     Patient takes Senna 1 tablet and Miralax 17 gm daily before bed.     Mother is not sure if patient has a rectal prolapse.   Triage recommended patient to be seen in ED if prolapse is suspected as this is not something that can be treated in the clinic. Mother agrees and will bring patient to ED.   Reason for Disposition   Patient sounds very sick or weak to the triager    Additional Information   Negative: Diarrhea is main symptom   Negative: Constipation is main symptom (e.g., pain or discomfort caused by passage of hard BMs)   Negative: Blood in or on bowel movement is main symptom   Negative: MPOX SUSPECTED (e.g., direct skin contact such as sex, recent travel to [place] or [place]) and any SYMPTOMS OF MPOX (e.g., rash, fever, muscle aches, or swollen lymph nodes)   Negative: At risk for Mpox (men-who-have-sex-with-men) and possible exposure (e.g., multiple sex partners in past 21 days) and ANY SYMPTOMS OF MPOX (e.g., rash, fever, muscle aches, or swollen lymph nodes)   Negative: Sexual assault or rape (sexual intercourse or activity occurs without freely given consent), known or suspected   Negative: Sounds like a life-threatening emergency to the triager   Negative: Injury to rectum    Protocols used: Rectal Symptoms-A-[place]    "

## 2024-06-19 ENCOUNTER — OFFICE VISIT (OUTPATIENT)
Dept: INTERNAL MEDICINE | Facility: CLINIC | Age: 34
End: 2024-06-19
Payer: MEDICAID

## 2024-06-19 VITALS
TEMPERATURE: 98 F | HEIGHT: 77 IN | DIASTOLIC BLOOD PRESSURE: 74 MMHG | BODY MASS INDEX: 21.83 KG/M2 | WEIGHT: 184.9 LBS | HEART RATE: 64 BPM | SYSTOLIC BLOOD PRESSURE: 122 MMHG | OXYGEN SATURATION: 97 % | RESPIRATION RATE: 14 BRPM

## 2024-06-19 DIAGNOSIS — D17.0 LIPOMA OF SCALP: ICD-10-CM

## 2024-06-19 DIAGNOSIS — Z11.59 NEED FOR HEPATITIS C SCREENING TEST: ICD-10-CM

## 2024-06-19 DIAGNOSIS — E78.5 HYPERLIPIDEMIA LDL GOAL <160: ICD-10-CM

## 2024-06-19 DIAGNOSIS — E06.3 AUTOIMMUNE HYPOTHYROIDISM: ICD-10-CM

## 2024-06-19 DIAGNOSIS — Z13.6 CARDIOVASCULAR SCREENING; LDL GOAL LESS THAN 130: ICD-10-CM

## 2024-06-19 DIAGNOSIS — K64.8 INTERNAL HEMORRHOIDS: Primary | ICD-10-CM

## 2024-06-19 DIAGNOSIS — K59.00 CONSTIPATION, UNSPECIFIED CONSTIPATION TYPE: ICD-10-CM

## 2024-06-19 DIAGNOSIS — Z23 HIGH PRIORITY FOR 2019-NCOV VACCINE: ICD-10-CM

## 2024-06-19 DIAGNOSIS — F39 EPISODIC MOOD DISORDER (H): ICD-10-CM

## 2024-06-19 LAB
ALBUMIN SERPL BCG-MCNC: 4.7 G/DL (ref 3.5–5.2)
ALP SERPL-CCNC: 85 U/L (ref 40–150)
ALT SERPL W P-5'-P-CCNC: 24 U/L (ref 0–70)
ANION GAP SERPL CALCULATED.3IONS-SCNC: 9 MMOL/L (ref 7–15)
AST SERPL W P-5'-P-CCNC: 24 U/L (ref 0–45)
BILIRUB SERPL-MCNC: 0.5 MG/DL
BUN SERPL-MCNC: 8.7 MG/DL (ref 6–20)
CALCIUM SERPL-MCNC: 9.6 MG/DL (ref 8.6–10)
CHLORIDE SERPL-SCNC: 96 MMOL/L (ref 98–107)
CHOLEST SERPL-MCNC: 170 MG/DL
CREAT SERPL-MCNC: 0.88 MG/DL (ref 0.67–1.17)
DEPRECATED HCO3 PLAS-SCNC: 28 MMOL/L (ref 22–29)
EGFRCR SERPLBLD CKD-EPI 2021: >90 ML/MIN/1.73M2
ERYTHROCYTE [DISTWIDTH] IN BLOOD BY AUTOMATED COUNT: 11.8 % (ref 10–15)
FASTING STATUS PATIENT QL REPORTED: NO
FASTING STATUS PATIENT QL REPORTED: NO
GLUCOSE SERPL-MCNC: 93 MG/DL (ref 70–99)
HCT VFR BLD AUTO: 41 % (ref 40–53)
HCV AB SERPL QL IA: NONREACTIVE
HDLC SERPL-MCNC: 51 MG/DL
HGB BLD-MCNC: 14.5 G/DL (ref 13.3–17.7)
LDLC SERPL CALC-MCNC: 104 MG/DL
MCH RBC QN AUTO: 29.3 PG (ref 26.5–33)
MCHC RBC AUTO-ENTMCNC: 35.4 G/DL (ref 31.5–36.5)
MCV RBC AUTO: 83 FL (ref 78–100)
NONHDLC SERPL-MCNC: 119 MG/DL
PLATELET # BLD AUTO: 177 10E3/UL (ref 150–450)
POTASSIUM SERPL-SCNC: 4.7 MMOL/L (ref 3.4–5.3)
PROT SERPL-MCNC: 7.7 G/DL (ref 6.4–8.3)
RBC # BLD AUTO: 4.95 10E6/UL (ref 4.4–5.9)
SODIUM SERPL-SCNC: 133 MMOL/L (ref 135–145)
TRIGL SERPL-MCNC: 75 MG/DL
TSH SERPL DL<=0.005 MIU/L-ACNC: 1.84 UIU/ML (ref 0.3–4.2)
WBC # BLD AUTO: 4.2 10E3/UL (ref 4–11)

## 2024-06-19 PROCEDURE — 36415 COLL VENOUS BLD VENIPUNCTURE: CPT | Performed by: INTERNAL MEDICINE

## 2024-06-19 PROCEDURE — 80061 LIPID PANEL: CPT | Performed by: INTERNAL MEDICINE

## 2024-06-19 PROCEDURE — 86803 HEPATITIS C AB TEST: CPT | Performed by: INTERNAL MEDICINE

## 2024-06-19 PROCEDURE — 91320 SARSCV2 VAC 30MCG TRS-SUC IM: CPT | Performed by: INTERNAL MEDICINE

## 2024-06-19 PROCEDURE — 85027 COMPLETE CBC AUTOMATED: CPT | Performed by: INTERNAL MEDICINE

## 2024-06-19 PROCEDURE — 99214 OFFICE O/P EST MOD 30 MIN: CPT | Mod: 25 | Performed by: INTERNAL MEDICINE

## 2024-06-19 PROCEDURE — 84443 ASSAY THYROID STIM HORMONE: CPT | Performed by: INTERNAL MEDICINE

## 2024-06-19 PROCEDURE — 90480 ADMN SARSCOV2 VAC 1/ONLY CMP: CPT | Performed by: INTERNAL MEDICINE

## 2024-06-19 PROCEDURE — 80053 COMPREHEN METABOLIC PANEL: CPT | Performed by: INTERNAL MEDICINE

## 2024-06-19 RX ORDER — SENNA AND DOCUSATE SODIUM 50; 8.6 MG/1; MG/1
2 TABLET, FILM COATED ORAL AT BEDTIME
Qty: 60 TABLET | Refills: 2 | Status: SHIPPED | OUTPATIENT
Start: 2024-06-19

## 2024-06-19 ASSESSMENT — PAIN SCALES - GENERAL: PAINLEVEL: SEVERE PAIN (7)

## 2024-06-19 NOTE — PATIENT INSTRUCTIONS
HEMORRHOIDS:     *  Normalize the bowel patterns by increasing the intake of fiber and maintaining adequate water intake (at least 6-8 glasses per day).    *  Take the fiber supplement of your choice:  Capsules, tablets, chewable tablets, powders, etc.   Find one that works best for you and take it regularly.    *  If the stools are consistently harder, then consider a stool softner such as colace (docusate) or senekot to help make the BMs larger and softer so they pass easier.      *  Preparation H (suppositories or cream) or similar over the counter product to help sooth these irritations.    *  Consider using Preparation H wipes after passing BMs as needed.  Do not overcleanse the rectal area as this will lead to more rectal irritation.     *  Sometimes sitting in a bathtub with Epsom salts for 10-15 minutes can help hemorrhoids as well.        Change stool softner to Senokot/Docusate combination stool softner:  2 tablets once per day at bedtime.       Return to see the Gastroenterology Clinic if needed.

## 2024-06-19 NOTE — PROGRESS NOTES
Assessment & Plan     (K64.8) Internal hemorrhoids  (primary encounter diagnosis)  Comment: Discussed hemorrhoids in detail, including pathophysiology, their cause by difficult elimination. conservative treatments starting with increased water and dietary fiber (friuts/vegetables/bran/psyllium), conservative treatments such as Preparation H, Anusol, and even topical benzocaine oint.  Discussed point of intervention and referral to colorectal surgeons as indicated by symptoms.  Will increase the stool softener, changed plain Senokot to combination stool softener Ducosate/senna, 2 tablets once daily in the evening.  Continue MiraLAX once daily.  Continue regular fiber supplementation of their choosing.  Aim for at least 6 to 8 glasses of water daily.  Return to see gastroenterology for repeat banding if needed.  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  Plan: TSH WITH FREE T4 REFLEX, REVIEW OF HEALTH         MAINTENANCE PROTOCOL ORDERS        Recheck labs,    (E78.5) Hyperlipidemia LDL goal <160  Comment: Consider statin if the LDL is a above 175.  Plan: Lipid panel reflex to direct LDL Non-fasting,         Comprehensive metabolic panel, CBC with         platelets, REVIEW OF HEALTH MAINTENANCE         PROTOCOL ORDERS            (F39) Episodic mood disorder (H24)  Comment: Known issue that I take into account for their medical decisions, managed by specialist.    Continue current therapy as directed by their specialist(s).   Plan: REVIEW OF HEALTH MAINTENANCE PROTOCOL ORDERS            (Z11.59) Need for hepatitis C screening test  Comment: One time  screening test per CDC recommendations.   Plan: Hepatitis C Screen Reflex to HCV RNA Quant and         Genotype, REVIEW OF HEALTH MAINTENANCE PROTOCOL        ORDERS            (Z23) High priority for 2019-nCoV vaccine  Comment:   Plan: COVID-19 12+ (2023-24) (PFIZER)            (Z13.6) CARDIOVASCULAR SCREENING; LDL GOAL LESS THAN 130  Comment: Discussed cardiac disease risk factors and cardiac disease risk factor modification.   Plan: Lipid panel reflex to direct LDL Non-fasting,         Comprehensive metabolic panel, CBC with         platelets            (D17.0) Lipoma of scalp  Comment: Large freely mobile mass on the vertex of the scalp consistent with probable lipoma.  Referral to dermatology.  Plan: Adult Dermatology  Referral                           Emma Rodrigues is a 33 year old, presenting for the following health issues:  Constipation, Hemorrhoids, Cough, and Imm/Inj (COVID-19 VACCINE)        6/19/2024     6:57 AM   Additional Questions   Roomed by Marcella BENTLEY CMA     History of Present Illness       Reason for visit:  Hemorroids prolapsed    He eats 2-3 servings of fruits and vegetables daily.He consumes 1 sweetened beverage(s) daily.He exercises with enough effort to increase his heart rate 30 to 60 minutes per day.  He exercises with enough effort to increase his heart rate 7 days per week.   He is taking medications regularly.       1.)  Ongoing issues with constipation and hemorrhoids.  Has not had internal hemorrhoids banded numerous times.  1 week ago mother noticed a swollen external hemorrhoid around his rectal area.  She attempted to give an enema but was just uncomfortable.  Since that time it has resolved.  He is currently taking MiraLAX powder on a nearly daily basis.  Taking Senokot tablets at bedtime.  Has been taking fiber supplementation.  Typically drinks 6 glasses of water daily.  No diarrhea, no melena, no blood in the toilet paper or toilet toilet water.    2.)  On  "oxcarbazepine through his psychiatrist.    3.)  History of hypothyroidism, on thyroid supplementation.  Due for lab checks.      4.)  History of mild hyperlipidemia.  No medications required.    5.)  History of cyclic vomiting syndrome that produces emergency room visits few times per year.  No recent episodes.      **I reviewed the information recorded in the patient's EPIC chart (including but not limited to medical history, surgical history, family history, problem list, medication list, and allergy list) and updated the information as indicated based on the patients reported information.         Review of Systems  Constitutional, HEENT, cardiovascular, pulmonary, gi and gu systems are negative, except as otherwise noted.      Objective    /74   Pulse 64   Temp 98  F (36.7  C) (Oral)   Resp 14   Ht 1.956 m (6' 5\")   Wt 83.9 kg (184 lb 14.4 oz)   SpO2 97%   BMI 21.93 kg/m    Body mass index is 21.93 kg/m .  Physical Exam   GENERAL alert and no distress  EYES:  Normal sclera,conjunctiva, EOMI  HENT: oral and posterior pharynx without lesions or erythema, facies symmetric  NECK: Neck supple. No LAD, without thyroidmegaly.  RESP: Clear to ausculation bilaterally without wheezes or crackles. Normal BS in all fields.  CV: RRR normal S1S2 without murmurs, rubs or gallops.  LYMPH: no cervical lymph adenopathy appreciated  MS: extremities- no gross deformities of the visible extremities noted,   EXT:  no lower extremity edema  PSYCH: Alert and oriented times 3; speech- coherent  SKIN:  No obvious significant skin lesions on visible portions of face   Large 2.5 - 3  cm nontender rubbery mass freely mobile within the skin of the vertex of the scalp, consistent with probable lipoma  RECTAL: No external hemorrhoids noted, the previous area of swelling and irritation noticed by the mother last week is disappeared.              The longitudinal plan of care for the diagnosis(es)/condition(s) as documented were " addressed during this visit. Due to the added complexity in care, I will continue to support Ravi in the subsequent management and with ongoing continuity of care.      Signed Electronically by: Bhaskar Gandara MD

## 2024-07-04 DIAGNOSIS — R11.15 INTRACTABLE CYCLICAL VOMITING WITH NAUSEA: ICD-10-CM

## 2024-07-04 DIAGNOSIS — J30.2 SEASONAL ALLERGIC RHINITIS, UNSPECIFIED TRIGGER: ICD-10-CM

## 2024-07-05 RX ORDER — CETIRIZINE HYDROCHLORIDE 10 MG/1
TABLET ORAL
Qty: 90 TABLET | Refills: 2 | Status: SHIPPED | OUTPATIENT
Start: 2024-07-05

## 2024-08-01 ENCOUNTER — HOSPITAL ENCOUNTER (EMERGENCY)
Facility: CLINIC | Age: 34
Discharge: HOME | End: 2024-08-02
Attending: SOCIAL WORKER | Admitting: SOCIAL WORKER
Payer: MEDICAID

## 2024-08-01 DIAGNOSIS — R11.10 VOMITING, UNSPECIFIED VOMITING TYPE, UNSPECIFIED WHETHER NAUSEA PRESENT: ICD-10-CM

## 2024-08-01 DIAGNOSIS — R10.33 PERIUMBILICAL ABDOMINAL PAIN: ICD-10-CM

## 2024-08-01 PROCEDURE — 250N000011 HC RX IP 250 OP 636: Performed by: SOCIAL WORKER

## 2024-08-01 PROCEDURE — 258N000003 HC RX IP 258 OP 636: Performed by: SOCIAL WORKER

## 2024-08-01 PROCEDURE — 80053 COMPREHEN METABOLIC PANEL: CPT | Performed by: SOCIAL WORKER

## 2024-08-01 PROCEDURE — 93005 ELECTROCARDIOGRAM TRACING: CPT

## 2024-08-01 PROCEDURE — 81001 URINALYSIS AUTO W/SCOPE: CPT | Performed by: SOCIAL WORKER

## 2024-08-01 PROCEDURE — 96361 HYDRATE IV INFUSION ADD-ON: CPT

## 2024-08-01 PROCEDURE — 85025 COMPLETE CBC W/AUTO DIFF WBC: CPT | Performed by: SOCIAL WORKER

## 2024-08-01 PROCEDURE — 36415 COLL VENOUS BLD VENIPUNCTURE: CPT | Performed by: SOCIAL WORKER

## 2024-08-01 PROCEDURE — 83690 ASSAY OF LIPASE: CPT | Performed by: SOCIAL WORKER

## 2024-08-01 PROCEDURE — 99285 EMERGENCY DEPT VISIT HI MDM: CPT | Mod: 25

## 2024-08-01 RX ORDER — ONDANSETRON 2 MG/ML
4 INJECTION INTRAMUSCULAR; INTRAVENOUS ONCE
Status: COMPLETED | OUTPATIENT
Start: 2024-08-01 | End: 2024-08-02

## 2024-08-01 RX ADMIN — SODIUM CHLORIDE 1000 ML: 9 INJECTION, SOLUTION INTRAVENOUS at 23:39

## 2024-08-01 RX ADMIN — ONDANSETRON 4 MG: 2 INJECTION INTRAMUSCULAR; INTRAVENOUS at 23:59

## 2024-08-01 ASSESSMENT — ACTIVITIES OF DAILY LIVING (ADL): ADLS_ACUITY_SCORE: 35

## 2024-08-02 ENCOUNTER — APPOINTMENT (OUTPATIENT)
Dept: CT IMAGING | Facility: CLINIC | Age: 34
End: 2024-08-02
Attending: SOCIAL WORKER
Payer: MEDICAID

## 2024-08-02 ENCOUNTER — NURSE TRIAGE (OUTPATIENT)
Dept: INTERNAL MEDICINE | Facility: CLINIC | Age: 34
End: 2024-08-02
Payer: MEDICAID

## 2024-08-02 VITALS
RESPIRATION RATE: 16 BRPM | SYSTOLIC BLOOD PRESSURE: 123 MMHG | DIASTOLIC BLOOD PRESSURE: 86 MMHG | OXYGEN SATURATION: 97 % | TEMPERATURE: 97.2 F | HEART RATE: 70 BPM

## 2024-08-02 DIAGNOSIS — K30 DELAYED GASTRIC EMPTYING: ICD-10-CM

## 2024-08-02 DIAGNOSIS — K31.84 GASTROPARESIS: ICD-10-CM

## 2024-08-02 DIAGNOSIS — R11.15 CYCLIC VOMITING SYNDROME: Primary | ICD-10-CM

## 2024-08-02 LAB
ALBUMIN SERPL BCG-MCNC: 4.7 G/DL (ref 3.5–5.2)
ALBUMIN UR-MCNC: NEGATIVE MG/DL
ALP SERPL-CCNC: 79 U/L (ref 40–150)
ALT SERPL W P-5'-P-CCNC: 30 U/L (ref 0–70)
ANION GAP SERPL CALCULATED.3IONS-SCNC: 11 MMOL/L (ref 7–15)
APPEARANCE UR: CLEAR
AST SERPL W P-5'-P-CCNC: 23 U/L (ref 0–45)
ATRIAL RATE - MUSE: 79 BPM
BASOPHILS # BLD AUTO: 0 10E3/UL (ref 0–0.2)
BASOPHILS NFR BLD AUTO: 0 %
BILIRUB SERPL-MCNC: 0.5 MG/DL
BILIRUB UR QL STRIP: NEGATIVE
BUN SERPL-MCNC: 9.3 MG/DL (ref 6–20)
CALCIUM SERPL-MCNC: 9.4 MG/DL (ref 8.8–10.4)
CHLORIDE SERPL-SCNC: 93 MMOL/L (ref 98–107)
COLOR UR AUTO: NORMAL
CREAT SERPL-MCNC: 0.82 MG/DL (ref 0.67–1.17)
DIASTOLIC BLOOD PRESSURE - MUSE: NORMAL MMHG
EGFRCR SERPLBLD CKD-EPI 2021: >90 ML/MIN/1.73M2
EOSINOPHIL # BLD AUTO: 0 10E3/UL (ref 0–0.7)
EOSINOPHIL NFR BLD AUTO: 0 %
ERYTHROCYTE [DISTWIDTH] IN BLOOD BY AUTOMATED COUNT: 11.8 % (ref 10–15)
GLUCOSE SERPL-MCNC: 109 MG/DL (ref 70–99)
GLUCOSE UR STRIP-MCNC: NEGATIVE MG/DL
HCO3 SERPL-SCNC: 26 MMOL/L (ref 22–29)
HCT VFR BLD AUTO: 39.5 % (ref 40–53)
HGB BLD-MCNC: 14.1 G/DL (ref 13.3–17.7)
HGB UR QL STRIP: NEGATIVE
IMM GRANULOCYTES # BLD: 0.1 10E3/UL
IMM GRANULOCYTES NFR BLD: 0 %
INTERPRETATION ECG - MUSE: NORMAL
KETONES UR STRIP-MCNC: NEGATIVE MG/DL
LEUKOCYTE ESTERASE UR QL STRIP: NEGATIVE
LIPASE SERPL-CCNC: 30 U/L (ref 13–60)
LYMPHOCYTES # BLD AUTO: 1.8 10E3/UL (ref 0.8–5.3)
LYMPHOCYTES NFR BLD AUTO: 12 %
MCH RBC QN AUTO: 29.1 PG (ref 26.5–33)
MCHC RBC AUTO-ENTMCNC: 35.7 G/DL (ref 31.5–36.5)
MCV RBC AUTO: 82 FL (ref 78–100)
MONOCYTES # BLD AUTO: 0.7 10E3/UL (ref 0–1.3)
MONOCYTES NFR BLD AUTO: 4 %
NEUTROPHILS # BLD AUTO: 12.9 10E3/UL (ref 1.6–8.3)
NEUTROPHILS NFR BLD AUTO: 83 %
NITRATE UR QL: NEGATIVE
NRBC # BLD AUTO: 0 10E3/UL
NRBC BLD AUTO-RTO: 0 /100
P AXIS - MUSE: 82 DEGREES
PH UR STRIP: 7 [PH] (ref 5–7)
PLATELET # BLD AUTO: 195 10E3/UL (ref 150–450)
POTASSIUM SERPL-SCNC: 3.9 MMOL/L (ref 3.4–5.3)
PR INTERVAL - MUSE: 150 MS
PROT SERPL-MCNC: 7.4 G/DL (ref 6.4–8.3)
QRS DURATION - MUSE: 98 MS
QT - MUSE: 394 MS
QTC - MUSE: 451 MS
R AXIS - MUSE: 86 DEGREES
RBC # BLD AUTO: 4.84 10E6/UL (ref 4.4–5.9)
RBC URINE: <1 /HPF
SODIUM SERPL-SCNC: 130 MMOL/L (ref 135–145)
SP GR UR STRIP: 1.02 (ref 1–1.03)
SYSTOLIC BLOOD PRESSURE - MUSE: NORMAL MMHG
T AXIS - MUSE: 67 DEGREES
UROBILINOGEN UR STRIP-MCNC: NORMAL MG/DL
VENTRICULAR RATE- MUSE: 79 BPM
WBC # BLD AUTO: 15.5 10E3/UL (ref 4–11)
WBC URINE: 1 /HPF

## 2024-08-02 PROCEDURE — 250N000011 HC RX IP 250 OP 636: Performed by: SOCIAL WORKER

## 2024-08-02 PROCEDURE — 96375 TX/PRO/DX INJ NEW DRUG ADDON: CPT

## 2024-08-02 PROCEDURE — 74177 CT ABD & PELVIS W/CONTRAST: CPT

## 2024-08-02 PROCEDURE — 96374 THER/PROPH/DIAG INJ IV PUSH: CPT | Mod: 59

## 2024-08-02 PROCEDURE — 250N000009 HC RX 250: Performed by: SOCIAL WORKER

## 2024-08-02 RX ORDER — KETOROLAC TROMETHAMINE 15 MG/ML
15 INJECTION, SOLUTION INTRAMUSCULAR; INTRAVENOUS ONCE
Status: COMPLETED | OUTPATIENT
Start: 2024-08-02 | End: 2024-08-02

## 2024-08-02 RX ORDER — IOPAMIDOL 755 MG/ML
92 INJECTION, SOLUTION INTRAVASCULAR ONCE
Status: COMPLETED | OUTPATIENT
Start: 2024-08-02 | End: 2024-08-02

## 2024-08-02 RX ORDER — DIPHENHYDRAMINE HYDROCHLORIDE 50 MG/ML
25 INJECTION INTRAMUSCULAR; INTRAVENOUS EVERY 6 HOURS PRN
Status: DISCONTINUED | OUTPATIENT
Start: 2024-08-02 | End: 2024-08-02 | Stop reason: HOSPADM

## 2024-08-02 RX ORDER — METOCLOPRAMIDE HYDROCHLORIDE 5 MG/ML
10 INJECTION INTRAMUSCULAR; INTRAVENOUS ONCE
Status: COMPLETED | OUTPATIENT
Start: 2024-08-02 | End: 2024-08-02

## 2024-08-02 RX ORDER — DROPERIDOL 2.5 MG/ML
0.62 INJECTION, SOLUTION INTRAMUSCULAR; INTRAVENOUS ONCE
Status: DISCONTINUED | OUTPATIENT
Start: 2024-08-02 | End: 2024-08-02

## 2024-08-02 RX ORDER — ONDANSETRON 2 MG/ML
INJECTION INTRAMUSCULAR; INTRAVENOUS
Status: COMPLETED
Start: 2024-08-02 | End: 2024-08-02

## 2024-08-02 RX ADMIN — PANTOPRAZOLE SODIUM 40 MG: 40 INJECTION, POWDER, FOR SOLUTION INTRAVENOUS at 00:00

## 2024-08-02 RX ADMIN — ONDANSETRON: 2 INJECTION INTRAMUSCULAR; INTRAVENOUS at 00:55

## 2024-08-02 RX ADMIN — IOPAMIDOL 92 ML: 755 INJECTION, SOLUTION INTRAVENOUS at 01:04

## 2024-08-02 RX ADMIN — KETOROLAC TROMETHAMINE 15 MG: 15 INJECTION, SOLUTION INTRAMUSCULAR; INTRAVENOUS at 02:52

## 2024-08-02 RX ADMIN — SODIUM CHLORIDE 66 ML: 9 INJECTION, SOLUTION INTRAVENOUS at 01:05

## 2024-08-02 RX ADMIN — METOCLOPRAMIDE 10 MG: 5 INJECTION, SOLUTION INTRAMUSCULAR; INTRAVENOUS at 02:52

## 2024-08-02 ASSESSMENT — ACTIVITIES OF DAILY LIVING (ADL)
ADLS_ACUITY_SCORE: 35

## 2024-08-02 NOTE — ED TRIAGE NOTES
Pt arrives via triage presenting with mom. Per mom, pt has been throwing up multiple times since 2100. Pt denies abdominal pain. Baseline developmental delays.      Triage Assessment (Adult)       Row Name 08/01/24 1585          Triage Assessment    Airway WDL WDL        Respiratory WDL    Respiratory WDL WDL        Skin Circulation/Temperature WDL    Skin Circulation/Temperature WDL WDL        Cardiac WDL    Cardiac WDL WDL        Peripheral/Neurovascular WDL    Peripheral Neurovascular WDL WDL        Cognitive/Neuro/Behavioral WDL    Cognitive/Neuro/Behavioral WDL X  baseline autism

## 2024-08-02 NOTE — TELEPHONE ENCOUNTER
"Nurse Triage SBAR    Is this a 2nd Level Triage? YES, LICENSED PRACTITIONER REVIEW IS REQUIRED    Situation: Pt continues to have nausea, vomiting, abd pain. Points to mid section on abd. Mother also reports pt is very jittery, \"they must have gave him something because he is tripping\"    Background: Was in ED late last night/early this morning for this. Hx of autism and gastroparesis. Has appt with GI at the end of the month.    Assessment: Moderate abd pain    Protocol Recommended Disposition:   See in Office Today    Recommendation: Pt was not given any medication from ED. Could pt benefit from anti nausea medications? (Mother states Reglan seems to work better for pt)  Mother wondering if appt today would be beneficial as she does not want go the weekend with pt like this. Please advise on next steps.    Routed to provider    Does the patient meet one of the following criteria for ADS visit consideration? 16+ years old, with an [facility] PCP     TIP  Providers, please consider if this condition is appropriate for management at one of our Acute and Diagnostic Services sites.     If patient is a good candidate, please use dotphrase <dot>triageresponse and select Refer to ADS to document.   Reason for Disposition   MODERATE pain (e.g., interferes with normal activities) that comes and goes (cramps) lasts > 24 hours  (Exception: Pain with Vomiting or Diarrhea - see that Protocol.)    Additional Information   Negative: Passed out (i.e., fainted, collapsed and was not responding)   Negative: Shock suspected (e.g., cold/pale/clammy skin, too weak to stand, low BP, rapid pulse)   Negative: Sounds like a life-threatening emergency to the triager   Negative: Followed an abdomen (stomach) injury   Negative: Chest pain   Negative: Pain is mainly in upper abdomen (if needed ask: 'is it mainly above the belly button?')   Negative: Abdomen bloating or swelling are main symptoms   Negative: SEVERE abdominal pain (e.g., " "excruciating)   Negative: Vomiting red blood or black (coffee ground) material   Negative: Blood in bowel movements  (Exception: Blood on surface of BM with constipation.)   Negative: Black or tarry bowel movements  (Exception: Chronic-unchanged black-grey BMs AND is taking iron pills or Pepto-Bismol.)   Negative: Unable to urinate (or only a few drops) and bladder feels very full   Negative: Pain in scrotum persists > 1 hour   Negative: MILD TO MODERATE constant pain lasting > 2 hours   Negative: Vomiting bile (green color)   Negative: Patient sounds very sick or weak to the triager   Negative: Vomiting and abdomen looks much more swollen than usual   Negative: White of the eyes have turned yellow (i.e., jaundice)   Negative: Blood in urine (red, pink, or tea-colored)   Negative: Fever > 103 F (39.4 C)   Negative: Fever > 101 F (38.3 C) and over 60 years of age   Negative: Fever > 100.0 F (37.8 C) and has diabetes mellitus or a weak immune system (e.g., HIV positive, cancer chemotherapy, organ transplant, splenectomy, chronic steroids)   Negative: Fever > 100.0 F (37.8 C) and bedridden (e.g., CVA, chronic illness, recovering from surgery)    Answer Assessment - Initial Assessment Questions  1. LOCATION: \"Where does it hurt?\"       \"Points to mid section\"    2. RADIATION: \"Does the pain shoot anywhere else?\" (e.g., chest, back)      No    3. ONSET: \"When did the pain begin?\" (Minutes, hours or days ago)       Yesterday     4. SUDDEN: \"Gradual or sudden onset?\"      Sudden    5. PATTERN \"Does the pain come and go, or is it constant?\"     - If it comes and goes: \"How long does it last?\" \"Do you have pain now?\"      (Note: Comes and goes means the pain is intermittent. It goes away completely between bouts.)     - If constant: \"Is it getting better, staying the same, or getting worse?\"       (Note: Constant means the pain never goes away completely; most serious pain is constant and gets worse.)       Constant     6. " "SEVERITY: \"How bad is the pain?\"  (e.g., Scale 1-10; mild, moderate, or severe)     - MILD (1-3): Doesn't interfere with normal activities, abdomen soft and not tender to touch.      - MODERATE (4-7): Interferes with normal activities or awakens from sleep, abdomen tender to touch.      - SEVERE (8-10): Excruciating pain, doubled over, unable to do any normal activities.        Moderate     7. RECURRENT SYMPTOM: \"Have you ever had this type of stomach pain before?\" If Yes, ask: \"When was the last time?\" and \"What happened that time?\"       Has had something similar     8. CAUSE: \"What do you think is causing the stomach pain?\"      Unsure, maybe gastroparesis     9. RELIEVING/AGGRAVATING FACTORS: \"What makes it better or worse?\" (e.g., antacids, bending or twisting motion, bowel movement)      Nausea    10. OTHER SYMPTOMS: \"Do you have any other symptoms?\" (e.g., back pain, diarrhea, fever, urination pain, vomiting)        Vomiting-yellow/bile    Protocols used: Abdominal Pain - Male-A-[place]    Saskia Neil RN    "

## 2024-08-02 NOTE — DISCHARGE INSTRUCTIONS
Ravi was seen in the emergency department for vomiting.  His exam workup was overall reassuring, we do not see signs of an acute emergency at this time.  He felt better after medications and was able to keep fluids down.    Please follow-up with his outpatient providers.    He starts to have vomiting again and cannot keep anything down again, if he is bloody or dark tarry stools, if he has what looks like coffee grounds in his vomit, develops a fever, please bring him back to the emergency department.

## 2024-08-02 NOTE — ED PROVIDER NOTES
Emergency Department Note      History of Present Illness     Chief Complaint  Vomiting    HPI  Ravi Dunne is a 33 year old male with history of autism, cyclic vomiting disorder, who presents with vomiting. The patient's mother reports that he has had three episodes of nausea and vomiting since 2100 tonight. He also complains of central abdominal pain. Mother explains that this has happened to him many times in the past, as he has history of gastroparesis and cyclic vomiting syndrome. She states that he asks to come to the hospital when he thinks he may need fluids.. He has otherwise been healthy and at his normal baseline prior to developing emesis. No enies bloody/coffee grounds emesis, fever, diarrhea, cough, and recent sick contact. No history of abdominal surgery.    Independent Historian  Mother as detailed above.    Review of External Notes  I reviewed the office visit from 6/19/2024: Noted to have internal hemorrhoids constipation   I reviewed the ED visit from 10/4/2023: Seen for abdominal pain, given toradol, zofran, benadryl, and reglan with improvement.   Past Medical History   Relevant Medical History and Problem List  Chronic motor or vocal tic disorder  External hemorrhoids  GERD  GI hemorrhage  Anxiety  Autism  Cyclic vomiting syndrome  Mood disorder  Hyperlipidemia  Gastroparesis    Medications  Colace  Pepcid  Atarax  Linzess  Zofran  Zyrtec  Lexapro  Levothyroxine  Ativan  Reglan  Prilosec  Trileptal  Seroquel  Imitrex    Physical Exam   Patient Vitals for the past 24 hrs:   BP Temp Pulse Resp SpO2   08/02/24 0256 123/86 -- 70 16 97 %   08/01/24 2253 (!) 168/85 97.2  F (36.2  C) 80 18 98 %     Physical Exam  General: Overall stable and nontoxic appearing  HEENT: Conjunctivae clear, no scleral icterus, mucous membranes moist  Neuro: Alert, moving all extremities equally with intention  CV: Regular rate and rhythm, radial and DP pulses equal  Respiratory: No signs of respiratory distress,  lungs clear to auscultation bilaterally   Abdomen: Soft, without rigidity or rebound throughout    No focal RLQ tenderness   No focal RUQ tenderness   No CVA tenderness bilaterally  MSK: No lower extremity swelling or tenderness     Diagnostics   Lab Results   Labs Ordered and Resulted from Time of ED Arrival to Time of ED Departure   COMPREHENSIVE METABOLIC PANEL - Abnormal       Result Value    Sodium 130 (*)     Potassium 3.9      Carbon Dioxide (CO2) 26      Anion Gap 11      Urea Nitrogen 9.3      Creatinine 0.82      GFR Estimate >90      Calcium 9.4      Chloride 93 (*)     Glucose 109 (*)     Alkaline Phosphatase 79      AST 23      ALT 30      Protein Total 7.4      Albumin 4.7      Bilirubin Total 0.5     CBC WITH PLATELETS AND DIFFERENTIAL - Abnormal    WBC Count 15.5 (*)     RBC Count 4.84      Hemoglobin 14.1      Hematocrit 39.5 (*)     MCV 82      MCH 29.1      MCHC 35.7      RDW 11.8      Platelet Count 195      % Neutrophils 83      % Lymphocytes 12      % Monocytes 4      % Eosinophils 0      % Basophils 0      % Immature Granulocytes 0      NRBCs per 100 WBC 0      Absolute Neutrophils 12.9 (*)     Absolute Lymphocytes 1.8      Absolute Monocytes 0.7      Absolute Eosinophils 0.0      Absolute Basophils 0.0      Absolute Immature Granulocytes 0.1      Absolute NRBCs 0.0     LIPASE - Normal    Lipase 30     ROUTINE UA WITH MICROSCOPIC REFLEX TO CULTURE - Normal    Color Urine Light Yellow      Appearance Urine Clear      Glucose Urine Negative      Bilirubin Urine Negative      Ketones Urine Negative      Specific Gravity Urine 1.017      Blood Urine Negative      pH Urine 7.0      Protein Albumin Urine Negative      Urobilinogen Urine Normal      Nitrite Urine Negative      Leukocyte Esterase Urine Negative      RBC Urine <1      WBC Urine 1         Imaging  CT Abdomen Pelvis w Contrast   Final Result   IMPRESSION:    1.  No focal inflammatory change. Normal appendix. No free air.      2.  Wall  thickening of the lower thoracic esophagus may relate to recent emesis versus esophagitis.        EKG 0 232  Sinus rhythm  Rate 79  QRS 98 QTc 451    Independent Interpretation  None    ED Course    Medications Administered  Medications   diphenhydrAMINE (BENADRYL) injection 25 mg (has no administration in time range)   ondansetron (ZOFRAN) injection 4 mg (4 mg Intravenous $Given 8/1/24 2359)   sodium chloride 0.9% BOLUS 1,000 mL (0 mLs Intravenous Stopped 8/2/24 0055)   pantoprazole (PROTONIX) IV push injection 40 mg (40 mg Intravenous $Given 8/2/24 0000)   iopamidol (ISOVUE-370) solution 92 mL (92 mLs Intravenous $Given 8/2/24 0104)   Saline Flush (66 mLs Intravenous $Given 8/2/24 0105)   ondansetron (ZOFRAN) 2 MG/ML injection (  $Given 8/2/24 0055)   ketorolac (TORADOL) injection 15 mg (15 mg Intravenous $Given 8/2/24 0252)   metoclopramide (REGLAN) injection 10 mg (10 mg Intravenous $Given 8/2/24 0252)         Discussion of Management  None    Social Determinants of Health adding to complexity of care  None    ED Course  ED Course as of 08/02/24 0958   Thu Aug 01, 2024   2301 I obtained history and examined the patient as noted above   Fri Aug 02, 2024   0133   IMPRESSION:   1.  No focal inflammatory change. Normal appendix. No free air.     2.  Wall thickening of the lower thoracic esophagus may relate to recent emesis versus esophagitis.     0225 I rechecked the patient and explained findings.    0347 I rechecked the patient again. He would like to be discharged at this time.         Medical Decision Making / Diagnosis   CMS Diagnoses: None    MIPS     None    MDM  Ravi Dunne is a 33 year old male with hx of gastroparesis and cyclic vomiting, who presents to the ED with chief complaint of periumbilical abdominal pain and vomiting. On exam, stable and nontoxic appearing. Per mother, this has happened many times and today's presentation is not different from prior episodes.  CT abdomen pelvis showed  distal esophageal thickening possibly in the setting of recent emesis versus esophagitis.  No signs of appendicitis.  Patient received medications in the emergency department and felt improved, was able to tolerate p.o.  Patient does have Reglan at home as a prescription.  Counseled follow-up with primary care provider as well as GI providers.  Return precautions were discussed with patient and mother.  They verbalized understanding.  Discharged in stable condition.    Disposition  The patient was discharged.     ICD-10 Codes:    ICD-10-CM    1. Vomiting, unspecified vomiting type, unspecified whether nausea present  R11.10       2. Periumbilical abdominal pain  R10.33            Discharge Medications  New Prescriptions    No medications on file         Scribe Disclosure:  I, Soren Hays, am serving as a scribe at 11:08 PM on 8/1/2024 to document services personally performed by Jennifer Gutierrez MD based on my observations and the provider's statements to me.       Jennifer Gutierrez MD  08/02/24 0957       Jennifer Gutierrez MD  08/02/24 0958

## 2024-08-04 ENCOUNTER — HEALTH MAINTENANCE LETTER (OUTPATIENT)
Age: 34
End: 2024-08-04

## 2024-08-05 ENCOUNTER — PATIENT OUTREACH (OUTPATIENT)
Dept: INTERNAL MEDICINE | Facility: CLINIC | Age: 34
End: 2024-08-05
Payer: MEDICAID

## 2024-08-05 RX ORDER — METOCLOPRAMIDE 10 MG/1
TABLET ORAL
Qty: 30 TABLET | Refills: 2 | Status: SHIPPED | OUTPATIENT
Start: 2024-08-05

## 2024-08-05 NOTE — TELEPHONE ENCOUNTER
"Relayed providers response to mother, Sol. Mother requests hosp f/u appt scheduled, patient in background asking \"So are you going to bring me to see Dr. Gandara?\"     Appt scheduled as requested.    Future Appointments 8/5/2024 - 2/1/2025        Date Visit Type Length Department Provider     8/20/2024  9:30 AM ED/HOSP FOLLOW UP 30 min  INTERNAL MEDICINE Bhaskar Gandara MD    Location Instructions:     [facility] is in the [facility] at [address] in [place]. This just east of the [address] [place] [address] exit off of [address]. Free parking is available; access the lot from [address] [place] [address] or [facility].               9/17/2024  8:30 AM ANNUAL WELLNESS 30 min  INTERNAL MEDICINE Bhaskar Gandara MD    Location Instructions:     [facility] is in the [facility] at [address] in [place]. This just east of the [address] [place] [address] exit off of [facility] [address] [facility] [address] [facility] [address]. Free parking is available; access the lot from [address] [place] [address] or [facility].                      "

## 2024-08-05 NOTE — TELEPHONE ENCOUNTER
"  Transitions of Care Outreach  Chief Complaint   Patient presents with    Hospital F/U       Most Recent Admission Date: 8/1/2024   Most Recent Admission Diagnosis:      Most Recent Discharge Date: 8/2/2024   Most Recent Discharge Diagnosis: Vomiting, unspecified vomiting type, unspecified whether nausea present - R11.10  Periumbilical abdominal pain - R10.33     Transitions of Care Assessment    Discharge Assessment  How are you doing now that you are home?: \"It was worse for the first couple of days, but by the next day he was getting better. Things have been better over the weekend and today.\"  How are your symptoms? (Red Flag symptoms escalate to triage hotline per guidelines): Improved  Do you know how to contact your clinic care team if you have future questions or changes to your health status? : Yes  Does the patient have their discharge instructions? : Yes  Does the patient have questions regarding their discharge instructions? : No  Were you started on any new medications or were there changes to any of your previous medications? : Yes  Does the patient have all of their medications?: No (see comment) (Will pick-up later)  Do you have questions regarding any of your medications? : No  Do you have all of your needed medical supplies or equipment (DME)?  (i.e. oxygen tank, CPAP, cane, etc.): Yes    Follow up Plan     Discharge Follow-Up  Discharge follow up appointment scheduled in alignment with recommended follow up timeframe or Transitions of Risk Category? (Low = within 30 days; Moderate= within 14 days; High= within 7 days): Yes  Discharge Follow Up Appointment Date: 08/20/24  Discharge Follow Up Appointment Scheduled with?: Primary Care Provider    Future Appointments   Date Time Provider Department Center   8/20/2024  9:30 AM Bhaskar Gandara MD [facility]   9/17/2024  8:30 AM Bhaskar Gandara MD [facility]       Outpatient Plan as outlined on AVS reviewed with patient.    For any urgent " concerns, please contact our 24 hour nurse triage line: [phone] ([phone])       Pratibha Portillo RN

## 2024-08-05 NOTE — TELEPHONE ENCOUNTER
I was out of office on Friday.     OK to use Metoclopramide (generic Reglan) 1/2 or 1 tablet twice daily as needed for nausea/vomiting.     Prescription(s) sent electronically to specified pharmacy.     Close encounter when done.

## 2024-08-20 ENCOUNTER — OFFICE VISIT (OUTPATIENT)
Dept: INTERNAL MEDICINE | Facility: CLINIC | Age: 34
End: 2024-08-20
Payer: MEDICAID

## 2024-08-20 VITALS
HEIGHT: 77 IN | RESPIRATION RATE: 20 BRPM | SYSTOLIC BLOOD PRESSURE: 104 MMHG | DIASTOLIC BLOOD PRESSURE: 74 MMHG | TEMPERATURE: 98 F | HEART RATE: 66 BPM | OXYGEN SATURATION: 97 % | WEIGHT: 186.1 LBS | BODY MASS INDEX: 21.97 KG/M2

## 2024-08-20 DIAGNOSIS — R11.2 NAUSEA AND VOMITING, UNSPECIFIED VOMITING TYPE: ICD-10-CM

## 2024-08-20 DIAGNOSIS — R11.15 CYCLIC VOMITING SYNDROME: Primary | ICD-10-CM

## 2024-08-20 DIAGNOSIS — F39 EPISODIC MOOD DISORDER (H): ICD-10-CM

## 2024-08-20 DIAGNOSIS — K64.8 INTERNAL HEMORRHOIDS: ICD-10-CM

## 2024-08-20 DIAGNOSIS — E06.3 AUTOIMMUNE HYPOTHYROIDISM: ICD-10-CM

## 2024-08-20 DIAGNOSIS — K59.00 CONSTIPATION, UNSPECIFIED CONSTIPATION TYPE: ICD-10-CM

## 2024-08-20 DIAGNOSIS — E87.1 HYPONATREMIA: ICD-10-CM

## 2024-08-20 DIAGNOSIS — R11.15 INTRACTABLE CYCLICAL VOMITING WITH NAUSEA: ICD-10-CM

## 2024-08-20 PROCEDURE — G2211 COMPLEX E/M VISIT ADD ON: HCPCS | Performed by: INTERNAL MEDICINE

## 2024-08-20 PROCEDURE — 99214 OFFICE O/P EST MOD 30 MIN: CPT | Performed by: INTERNAL MEDICINE

## 2024-08-20 RX ORDER — POLYETHYLENE GLYCOL 3350 17 G/17G
1 POWDER, FOR SOLUTION ORAL AT BEDTIME
COMMUNITY
Start: 2022-09-12

## 2024-08-20 RX ORDER — CALCIUM POLYCARBOPHIL 625 MG
1 TABLET ORAL 2 TIMES DAILY
COMMUNITY
Start: 2021-12-13

## 2024-08-20 RX ORDER — IBUPROFEN 200 MG
200 TABLET ORAL EVERY 4 HOURS PRN
COMMUNITY
Start: 2023-05-16

## 2024-08-20 ASSESSMENT — PAIN SCALES - GENERAL: PAINLEVEL: NO PAIN (0)

## 2024-08-20 NOTE — PATIENT INSTRUCTIONS
Follow up with [facility] [phone] (fax [phone])  as planned.       Continue all medications at the same doses.  Contact your usual pharmacy if you need refills.      Plan to get the annual influenza vaccine each fall for sure by the end of October for the best protection throughout the winter flu season.   Get this from any pharmacy or flu shot clinic.       Plan to get an updated Covid booster each fall at least by the end of October for the best protection throughout the winter season.   Get this from any pharmacy or Covid vaccine clinic.          Return to see me in 6 months, schedule a follow up visit sooner if needed for anything else.  Use [employer] or Call [phone] to schedule the appointment with me.

## 2024-08-20 NOTE — PROGRESS NOTES
Assessment & Plan     (R11.15) Cyclic vomiting syndrome  (primary encounter diagnosis)  Comment: Another episode of cyclic vomiting.  This episode was little more intense than previous episodes.  Follow-up with gastroenterology as planned.  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  Plan:     (F39) Episodic mood disorder (H24)  Comment: Known issue that I take into account for their medical decisions, managed by specialist.    Continue current therapy as directed by their specialist(s).   Plan:     (R11.2) Nausea and vomiting, unspecified vomiting type  Comment:   Plan:     (E87.1) Hyponatremia  Comment: Mild hyponatremia while acutely vomiting.  Plan:     (R11.15) Intractable cyclical vomiting with nausea  Comment:   Plan:     (K59.00) Constipation, unspecified constipation type  Comment: Chronic constipation, follow-up with gastroenterology clinic.       (K64.8) Internal hemorrhoids  Comment:  Ongoing hemorrhoids.  Has gastroenterology clinic follow-up for potential removal next month.  Follow-up with gastroenterology as planned.  Plan:   Plan:            MED REC REQUIRED  Post Medication Reconciliation Status: discharge medications reconciled, continue medications without change        Emma Rodrigues is a 33 year old, presenting for the following health issues:  Hospital F/U        8/20/2024     9:11 AM   Additional Questions   Roomed by Marcella BENTLEY CMA     [facility]       ED/UC Followup:    Facility:  [facility]  Date of visit: 8-1-24  Reason for visit: vomiting  Current Status: improved    Improved immediately after anxiety meds and nausea meds.   Took few days to return to normal.   Usually returns to normal after few hours in ER, but this time longer  No fevers, no chills.   Noone else at home was sick.         2.)  History of hypothyroidism.  On replacement therapy.  He has not experienced any significant side effects of this medication.   The patient  "denies of fatigue, weight changes, heat/cold intolerance, hair changes, nail changes, bowel changes.     Latest labs reviewed:    Lab Results   Component Value Date    TSH 1.84 06/19/2024    TSH 3.21 11/06/2021    TSH 0.45 07/06/2020    TSH 0.38 04/03/2019    TSH 0.80 05/02/2018    TSH 1.98 11/15/2017    TSH 8.48 08/11/2017    TSH 7.47 02/14/2017    TSH 1.17 06/24/2014    TSH 2.80 02/02/2010    TSH 2.52 04/23/2009        3.)  history of prior hyperlipidemia.  On no meds  Reviewed labs:    Recent Labs   Lab Test 06/19/24  0807 04/03/19  0852   CHOL 170 189   HDL 51 39*   * 122*   TRIG 75 140       **I reviewed the information recorded in the patient's EPIC chart (including but not limited to medical history, surgical history, family history, problem list, medication list, and allergy list) and updated the information as indicated based on the patients reported information.         Review of Systems  Constitutional, HEENT, cardiovascular, pulmonary, gi and gu systems are negative, except as otherwise noted.      Objective    /74   Pulse 66   Temp 98  F (36.7  C) (Oral)   Resp 20   Ht 1.956 m (6' 5\")   Wt 84.4 kg (186 lb 1.6 oz)   SpO2 97%   BMI 22.07 kg/m    Body mass index is 22.07 kg/m .  Physical Exam   GENERAL alert and no distress  EYES:  Normal sclera,conjunctiva, EOMI  HENT: oral and posterior pharynx without lesions or erythema, facies symmetric  NECK: Neck supple. No LAD, without thyroidmegaly.  RESP: Clear to ausculation bilaterally without wheezes or crackles. Normal BS in all fields.  CV: RRR normal S1S2 without murmurs, rubs or gallops.  LYMPH: no cervical lymph adenopathy appreciated  MS: extremities- no gross deformities of the visible extremities noted,   EXT:  no lower extremity edema  PSYCH: Alert and oriented times 3; speech- coherent  SKIN:  No obvious significant skin lesions on visible portions of face   ABD:  Soft, nontender, nondistended, normal bowel sounds          The " longitudinal plan of care for the diagnosis(es)/condition(s) as documented were addressed during this visit. Due to the added complexity in care, I will continue to support Ravi in the subsequent management and with ongoing continuity of care.    Signed Electronically by: Bhaskar Gandara MD

## 2024-08-21 ENCOUNTER — TRANSFERRED RECORDS (OUTPATIENT)
Dept: HEALTH INFORMATION MANAGEMENT | Facility: CLINIC | Age: 34
End: 2024-08-21
Payer: MEDICAID

## 2024-09-10 ENCOUNTER — TRANSFERRED RECORDS (OUTPATIENT)
Dept: HEALTH INFORMATION MANAGEMENT | Facility: CLINIC | Age: 34
End: 2024-09-10
Payer: MEDICAID

## 2024-11-01 ENCOUNTER — HOSPITAL ENCOUNTER (EMERGENCY)
Facility: CLINIC | Age: 34
Discharge: HOME | End: 2024-11-01
Attending: EMERGENCY MEDICINE | Admitting: EMERGENCY MEDICINE
Payer: MEDICAID

## 2024-11-01 ENCOUNTER — TELEPHONE (OUTPATIENT)
Dept: INTERNAL MEDICINE | Facility: CLINIC | Age: 34
End: 2024-11-01

## 2024-11-01 VITALS
RESPIRATION RATE: 20 BRPM | BODY MASS INDEX: 22.53 KG/M2 | TEMPERATURE: 98.3 F | DIASTOLIC BLOOD PRESSURE: 91 MMHG | WEIGHT: 190 LBS | OXYGEN SATURATION: 95 % | SYSTOLIC BLOOD PRESSURE: 153 MMHG | HEART RATE: 75 BPM

## 2024-11-01 DIAGNOSIS — R11.2 NAUSEA AND VOMITING, UNSPECIFIED VOMITING TYPE: ICD-10-CM

## 2024-11-01 DIAGNOSIS — K59.00 CONSTIPATION, UNSPECIFIED CONSTIPATION TYPE: ICD-10-CM

## 2024-11-01 LAB
ANION GAP SERPL CALCULATED.3IONS-SCNC: 16 MMOL/L (ref 7–15)
BASOPHILS # BLD AUTO: 0 10E3/UL (ref 0–0.2)
BASOPHILS NFR BLD AUTO: 0 %
BUN SERPL-MCNC: 6.6 MG/DL (ref 6–20)
CALCIUM SERPL-MCNC: 9.2 MG/DL (ref 8.8–10.4)
CHLORIDE SERPL-SCNC: 93 MMOL/L (ref 98–107)
CREAT SERPL-MCNC: 0.78 MG/DL (ref 0.67–1.17)
EGFRCR SERPLBLD CKD-EPI 2021: >90 ML/MIN/1.73M2
EOSINOPHIL # BLD AUTO: 0 10E3/UL (ref 0–0.7)
EOSINOPHIL NFR BLD AUTO: 1 %
ERYTHROCYTE [DISTWIDTH] IN BLOOD BY AUTOMATED COUNT: 11.7 % (ref 10–15)
GLUCOSE SERPL-MCNC: 112 MG/DL (ref 70–99)
HCO3 SERPL-SCNC: 20 MMOL/L (ref 22–29)
HCT VFR BLD AUTO: 36.8 % (ref 40–53)
HGB BLD-MCNC: 13.5 G/DL (ref 13.3–17.7)
IMM GRANULOCYTES # BLD: 0 10E3/UL
IMM GRANULOCYTES NFR BLD: 0 %
LYMPHOCYTES # BLD AUTO: 1.2 10E3/UL (ref 0.8–5.3)
LYMPHOCYTES NFR BLD AUTO: 14 %
MCH RBC QN AUTO: 29.2 PG (ref 26.5–33)
MCHC RBC AUTO-ENTMCNC: 36.7 G/DL (ref 31.5–36.5)
MCV RBC AUTO: 80 FL (ref 78–100)
MONOCYTES # BLD AUTO: 0.4 10E3/UL (ref 0–1.3)
MONOCYTES NFR BLD AUTO: 5 %
NEUTROPHILS # BLD AUTO: 7.1 10E3/UL (ref 1.6–8.3)
NEUTROPHILS NFR BLD AUTO: 81 %
NRBC # BLD AUTO: 0 10E3/UL
NRBC BLD AUTO-RTO: 0 /100
PLATELET # BLD AUTO: 185 10E3/UL (ref 150–450)
POTASSIUM SERPL-SCNC: 3.8 MMOL/L (ref 3.4–5.3)
RBC # BLD AUTO: 4.62 10E6/UL (ref 4.4–5.9)
SODIUM SERPL-SCNC: 129 MMOL/L (ref 135–145)
WBC # BLD AUTO: 8.8 10E3/UL (ref 4–11)

## 2024-11-01 PROCEDURE — 250N000011 HC RX IP 250 OP 636: Performed by: EMERGENCY MEDICINE

## 2024-11-01 PROCEDURE — 258N000003 HC RX IP 258 OP 636: Performed by: EMERGENCY MEDICINE

## 2024-11-01 PROCEDURE — 96374 THER/PROPH/DIAG INJ IV PUSH: CPT

## 2024-11-01 PROCEDURE — 96361 HYDRATE IV INFUSION ADD-ON: CPT

## 2024-11-01 PROCEDURE — 80048 BASIC METABOLIC PNL TOTAL CA: CPT | Performed by: EMERGENCY MEDICINE

## 2024-11-01 PROCEDURE — 96375 TX/PRO/DX INJ NEW DRUG ADDON: CPT

## 2024-11-01 PROCEDURE — 85025 COMPLETE CBC W/AUTO DIFF WBC: CPT | Performed by: EMERGENCY MEDICINE

## 2024-11-01 PROCEDURE — 99284 EMERGENCY DEPT VISIT MOD MDM: CPT | Mod: 25

## 2024-11-01 PROCEDURE — 36415 COLL VENOUS BLD VENIPUNCTURE: CPT | Performed by: EMERGENCY MEDICINE

## 2024-11-01 RX ORDER — ONDANSETRON 2 MG/ML
4 INJECTION INTRAMUSCULAR; INTRAVENOUS ONCE
Status: COMPLETED | OUTPATIENT
Start: 2024-11-01 | End: 2024-11-01

## 2024-11-01 RX ORDER — LORAZEPAM 2 MG/ML
1 INJECTION INTRAMUSCULAR ONCE
Status: COMPLETED | OUTPATIENT
Start: 2024-11-01 | End: 2024-11-01

## 2024-11-01 RX ORDER — METOCLOPRAMIDE HYDROCHLORIDE 5 MG/ML
10 INJECTION INTRAMUSCULAR; INTRAVENOUS ONCE
Status: COMPLETED | OUTPATIENT
Start: 2024-11-01 | End: 2024-11-01

## 2024-11-01 RX ORDER — DOCUSATE SODIUM 50 MG AND SENNOSIDES 8.6 MG 8.6; 5 MG/1; MG/1
2 TABLET, FILM COATED ORAL AT BEDTIME
Qty: 60 TABLET | Refills: 2 | Status: SHIPPED | OUTPATIENT
Start: 2024-11-01

## 2024-11-01 RX ADMIN — LORAZEPAM 1 MG: 2 INJECTION INTRAMUSCULAR; INTRAVENOUS at 22:02

## 2024-11-01 RX ADMIN — SODIUM CHLORIDE 500 ML: 9 INJECTION, SOLUTION INTRAVENOUS at 21:56

## 2024-11-01 RX ADMIN — METOCLOPRAMIDE 10 MG: 5 INJECTION, SOLUTION INTRAMUSCULAR; INTRAVENOUS at 22:01

## 2024-11-01 RX ADMIN — ONDANSETRON 4 MG: 2 INJECTION, SOLUTION INTRAMUSCULAR; INTRAVENOUS at 20:11

## 2024-11-01 ASSESSMENT — ACTIVITIES OF DAILY LIVING (ADL)
ADLS_ACUITY_SCORE: 0

## 2024-11-01 NOTE — TELEPHONE ENCOUNTER
Medication Question or Refill        What medication are you calling about (include dose and sig)?: Stimulant 8.6-50MG TABLET    Preferred Pharmacy:   [id number] - [place], [place] - [address] AT [facility] [address]  [facility] [address]  Phone: [phone] Fax: [phone]      Controlled Substance Agreement on file:   CSA -- Patient Level:    CSA: None found at the patient level.       Who prescribed the medication?: Gandara    Do you need a refill? Yes    When did you use the medication last? 5 days ago    Patient offered an appointment? No    Do you have any questions or concerns?  Yes: patient has been out for a few days would like sent over over ASA      Could we send this information to you in [facility] or would you prefer to receive a phone call?:   Patient would prefer a phone call   Okay to leave a detailed message?: Yes at Cell number on file:    Telephone Information:   Mobile [phone]

## 2024-11-02 NOTE — ED TRIAGE NOTES
Patient presents with nausea and vomiting that started around 1700 today.  He has hx of gastroparesis and often needs IV fluids and nausea medications to control symptoms.  Hx of autism and has difficulty verbalizing symptoms.  Mother is present assisting with HPI.     Triage Assessment (Adult)       Row Name 11/01/24 1958          Triage Assessment    Airway WDL WDL        Respiratory WDL    Respiratory WDL WDL        Skin Circulation/Temperature WDL    Skin Circulation/Temperature WDL WDL        Cardiac WDL    Cardiac WDL WDL        Peripheral/Neurovascular WDL    Peripheral Neurovascular WDL WDL        Cognitive/Neuro/Behavioral WDL    Cognitive/Neuro/Behavioral WDL WDL                      No

## 2024-11-02 NOTE — ED PROVIDER NOTES
Emergency Department Note      History of Present Illness     Chief Complaint  Nausea & Vomiting    HPI  Ravi Dunne is a 34 year old male with history of gastroparesis and autism who presents to the ED with his mother for evaluation of nausea and vomiting. His mother reports patient start to not feel well at 1700 with symptoms of abdominal pain, nausea and vomiting. She states patient often needs IV fluids and medications to help with his symptoms in the past. No fever. Patient eats a small amount of food throughout the day due to his GI issues. Patient has an GI appointment scheduled in a few weeks.     Independent Historian  Mother as detailed above.    Review of External Notes  None  Past Medical History   Medical History and Problem List   Chronic motor or vocal tic disorder  External hemorrhoids  GERD  GI hemorrhage  Anxiety  Autism  Cyclic vomiting syndrome  Mood disorder  Hyperlipidemia  Gastroparesis    Medications   Fiber  Zyrtec  Lexapro  Levothyroxine  Ativan   Reglan  Prilosec  Trileptal  Seroquel  Physical Exam   Patient Vitals for the past 24 hrs:   BP Temp Temp src Pulse Resp SpO2 Weight   11/01/24 2239 (!) 153/91 -- -- 75 20 95 % --   11/01/24 1957 121/70 98.3  F (36.8  C) Temporal 58 18 98 % 86.2 kg (190 lb)     Physical Exam    Eyes:  The pupils are equal and round    Conjunctivae and sclerae are normal  ENT:    The nose is normal    Pinnae are normal    The oropharynx is dry  Neck:  Normal range of motion    There is no rigidity noted  Resp:  Normal respiratory effort    Speaks in full sentences  GI:  Abdomen is soft and non-tender, there is no rigidity    No distension    No rebound tenderness   MS:  Normal muscular tone    No asymmetric leg swelling  Skin:  No rash or acute skin lesions noted  Neuro:   Awake, alert.      Speech is normal and fluent.    Face is symmetric.     Moves all extremities  Psych: Normal affect.  Appropriate interactions.      Diagnostics   Lab Results   Labs  "Ordered and Resulted from Time of ED Arrival to Time of ED Departure   BASIC METABOLIC PANEL - Abnormal       Result Value    Sodium 129 (*)     Potassium 3.8      Chloride 93 (*)     Carbon Dioxide (CO2) 20 (*)     Anion Gap 16 (*)     Urea Nitrogen 6.6      Creatinine 0.78      GFR Estimate >90      Calcium 9.2      Glucose 112 (*)    CBC WITH PLATELETS AND DIFFERENTIAL - Abnormal    WBC Count 8.8      RBC Count 4.62      Hemoglobin 13.5      Hematocrit 36.8 (*)     MCV 80      MCH 29.2      MCHC 36.7 (*)     RDW 11.7      Platelet Count 185      % Neutrophils 81      % Lymphocytes 14      % Monocytes 5      % Eosinophils 1      % Basophils 0      % Immature Granulocytes 0      NRBCs per 100 WBC 0      Absolute Neutrophils 7.1      Absolute Lymphocytes 1.2      Absolute Monocytes 0.4      Absolute Eosinophils 0.0      Absolute Basophils 0.0      Absolute Immature Granulocytes 0.0      Absolute NRBCs 0.0       ED Course    Medications Administered  Medications   ondansetron (ZOFRAN) injection 4 mg (4 mg Intravenous $Given 11/1/24 2011)   LORazepam (ATIVAN) injection 1 mg (1 mg Intravenous $Given 11/1/24 2202)   sodium chloride 0.9% BOLUS 500 mL (0 mLs Intravenous Stopped 11/1/24 2239)   metoclopramide (REGLAN) injection 10 mg (10 mg Intravenous $Given 11/1/24 2201)     Procedures  Procedures     Discussion of Management  None    Additional Documentation  None    ED Course  ED Course as of 11/01/24 2304 Fri Nov 01, 2024 2140 I obtained history and examined the patient as noted above.    2224 I rechecked the patient and explained findings.    2303 I rechecked the patient and explained findings. I prepared the patient to be discharged home.      Medical Decision Making / Diagnosis   CMS Diagnoses: None    MIPS     None    [facility]  Ravi Dunne is a 34 year old male who presents to the emergency department with concerns about nausea and vomiting.  He has recurrent episodes of nausea and vomiting and \"cyclic vomiting " "syndrome.\"  On evaluation here there is no significant worrisome exam findings.  Vital signs are reassuring.  Laboratory workup without significant abnormalities.  Patient given a small amount of IV fluids, Reglan and lorazepam.  Subsequently his symptoms were improved and he was able to tolerate oral liquids.  He requested discharged home and is power of /mother is here with him and agreeable to plan for discharge.  Patient was encouraged to follow-up with PCP and return with any new or worrisome symptoms.    Disposition  The patient was discharged.     ICD-10 Codes:    ICD-10-CM    1. Nausea and vomiting, unspecified vomiting type  R11.2          Discharge Medications  New Prescriptions    No medications on file     Scribe Disclosure:  I, Maria Esther Baker, am serving as a scribe at 9:52 PM on 11/1/2024 to document services personally performed by Ernie Richard MD based on my observations and the provider's statements to me.      Ernie Richard MD  11/02/24 0032    "

## 2024-11-06 ENCOUNTER — PATIENT OUTREACH (OUTPATIENT)
Dept: INTERNAL MEDICINE | Facility: CLINIC | Age: 34
End: 2024-11-06
Payer: MEDICAID

## 2024-11-21 ENCOUNTER — TRANSFERRED RECORDS (OUTPATIENT)
Dept: HEALTH INFORMATION MANAGEMENT | Facility: CLINIC | Age: 34
End: 2024-11-21
Payer: MEDICAID

## 2024-11-22 ENCOUNTER — HOSPITAL ENCOUNTER (EMERGENCY)
Facility: CLINIC | Age: 34
Discharge: HOME | End: 2024-11-22
Attending: EMERGENCY MEDICINE | Admitting: EMERGENCY MEDICINE
Payer: MEDICAID

## 2024-11-22 ENCOUNTER — APPOINTMENT (OUTPATIENT)
Dept: CT IMAGING | Facility: CLINIC | Age: 34
End: 2024-11-22
Attending: EMERGENCY MEDICINE
Payer: MEDICAID

## 2024-11-22 VITALS
RESPIRATION RATE: 18 BRPM | TEMPERATURE: 98.2 F | HEART RATE: 87 BPM | SYSTOLIC BLOOD PRESSURE: 138 MMHG | DIASTOLIC BLOOD PRESSURE: 82 MMHG | OXYGEN SATURATION: 98 %

## 2024-11-22 DIAGNOSIS — K62.89 RECTAL PAIN: ICD-10-CM

## 2024-11-22 DIAGNOSIS — K64.4 EXTERNAL HEMORRHOIDS: ICD-10-CM

## 2024-11-22 LAB
ALBUMIN UR-MCNC: NEGATIVE MG/DL
ANION GAP SERPL CALCULATED.3IONS-SCNC: 12 MMOL/L (ref 7–15)
APPEARANCE UR: CLEAR
BASOPHILS # BLD AUTO: 0 10E3/UL (ref 0–0.2)
BASOPHILS NFR BLD AUTO: 0 %
BILIRUB UR QL STRIP: NEGATIVE
BUN SERPL-MCNC: 6.6 MG/DL (ref 6–20)
CALCIUM SERPL-MCNC: 9.7 MG/DL (ref 8.8–10.4)
CHLORIDE SERPL-SCNC: 96 MMOL/L (ref 98–107)
COLOR UR AUTO: ABNORMAL
CREAT SERPL-MCNC: 0.77 MG/DL (ref 0.67–1.17)
EGFRCR SERPLBLD CKD-EPI 2021: >90 ML/MIN/1.73M2
EOSINOPHIL # BLD AUTO: 0 10E3/UL (ref 0–0.7)
EOSINOPHIL NFR BLD AUTO: 0 %
ERYTHROCYTE [DISTWIDTH] IN BLOOD BY AUTOMATED COUNT: ABNORMAL %
GLUCOSE SERPL-MCNC: 104 MG/DL (ref 70–99)
GLUCOSE UR STRIP-MCNC: NEGATIVE MG/DL
HCO3 SERPL-SCNC: 23 MMOL/L (ref 22–29)
HCT VFR BLD AUTO: ABNORMAL %
HGB BLD-MCNC: 14.9 G/DL (ref 13.3–17.7)
HGB UR QL STRIP: NEGATIVE
IMM GRANULOCYTES # BLD: 0.1 10E3/UL
IMM GRANULOCYTES NFR BLD: 0 %
KETONES UR STRIP-MCNC: 10 MG/DL
LEUKOCYTE ESTERASE UR QL STRIP: NEGATIVE
LYMPHOCYTES # BLD AUTO: 1.4 10E3/UL (ref 0.8–5.3)
LYMPHOCYTES NFR BLD AUTO: 11 %
MCH RBC QN AUTO: ABNORMAL PG
MCHC RBC AUTO-ENTMCNC: ABNORMAL G/DL
MCV RBC AUTO: ABNORMAL FL
MONOCYTES # BLD AUTO: 1 10E3/UL (ref 0–1.3)
MONOCYTES NFR BLD AUTO: 8 %
NEUTROPHILS # BLD AUTO: 10.6 10E3/UL (ref 1.6–8.3)
NEUTROPHILS NFR BLD AUTO: 81 %
NITRATE UR QL: NEGATIVE
NRBC # BLD AUTO: 0 10E3/UL
NRBC BLD AUTO-RTO: 0 /100
PH UR STRIP: 7.5 [PH] (ref 5–7)
PLAT MORPH BLD: NORMAL
PLATELET # BLD AUTO: 232 10E3/UL (ref 150–450)
POTASSIUM SERPL-SCNC: 4.2 MMOL/L (ref 3.4–5.3)
RBC # BLD AUTO: ABNORMAL 10*6/UL
RBC MORPH BLD: NORMAL
RBC URINE: 1 /HPF
SODIUM SERPL-SCNC: 131 MMOL/L (ref 135–145)
SP GR UR STRIP: 1 (ref 1–1.03)
UROBILINOGEN UR STRIP-MCNC: NORMAL MG/DL
WBC # BLD AUTO: 13 10E3/UL (ref 4–11)
WBC URINE: <1 /HPF

## 2024-11-22 PROCEDURE — 250N000009 HC RX 250: Performed by: EMERGENCY MEDICINE

## 2024-11-22 PROCEDURE — 250N000011 HC RX IP 250 OP 636: Performed by: EMERGENCY MEDICINE

## 2024-11-22 PROCEDURE — 85049 AUTOMATED PLATELET COUNT: CPT | Performed by: EMERGENCY MEDICINE

## 2024-11-22 PROCEDURE — 99285 EMERGENCY DEPT VISIT HI MDM: CPT | Mod: 25

## 2024-11-22 PROCEDURE — 96374 THER/PROPH/DIAG INJ IV PUSH: CPT | Mod: 59

## 2024-11-22 PROCEDURE — 96361 HYDRATE IV INFUSION ADD-ON: CPT

## 2024-11-22 PROCEDURE — 81003 URINALYSIS AUTO W/O SCOPE: CPT | Performed by: EMERGENCY MEDICINE

## 2024-11-22 PROCEDURE — 258N000003 HC RX IP 258 OP 636: Performed by: EMERGENCY MEDICINE

## 2024-11-22 PROCEDURE — 85004 AUTOMATED DIFF WBC COUNT: CPT | Performed by: EMERGENCY MEDICINE

## 2024-11-22 PROCEDURE — 82374 ASSAY BLOOD CARBON DIOXIDE: CPT | Performed by: EMERGENCY MEDICINE

## 2024-11-22 PROCEDURE — 74177 CT ABD & PELVIS W/CONTRAST: CPT

## 2024-11-22 PROCEDURE — 80048 BASIC METABOLIC PNL TOTAL CA: CPT | Performed by: EMERGENCY MEDICINE

## 2024-11-22 PROCEDURE — 36415 COLL VENOUS BLD VENIPUNCTURE: CPT | Performed by: EMERGENCY MEDICINE

## 2024-11-22 PROCEDURE — 96375 TX/PRO/DX INJ NEW DRUG ADDON: CPT

## 2024-11-22 RX ORDER — IOPAMIDOL 755 MG/ML
95 INJECTION, SOLUTION INTRAVASCULAR ONCE
Status: COMPLETED | OUTPATIENT
Start: 2024-11-22 | End: 2024-11-22

## 2024-11-22 RX ORDER — MORPHINE SULFATE 4 MG/ML
4 INJECTION, SOLUTION INTRAMUSCULAR; INTRAVENOUS ONCE
Status: COMPLETED | OUTPATIENT
Start: 2024-11-22 | End: 2024-11-22

## 2024-11-22 RX ORDER — LORAZEPAM 2 MG/ML
0.5 INJECTION INTRAMUSCULAR ONCE
Status: COMPLETED | OUTPATIENT
Start: 2024-11-22 | End: 2024-11-22

## 2024-11-22 RX ORDER — ONDANSETRON 2 MG/ML
4 INJECTION INTRAMUSCULAR; INTRAVENOUS EVERY 30 MIN PRN
Status: DISCONTINUED | OUTPATIENT
Start: 2024-11-22 | End: 2024-11-22 | Stop reason: HOSPADM

## 2024-11-22 RX ORDER — LIDOCAINE 50 MG/G
OINTMENT TOPICAL PRN
Qty: 50 G | Refills: 0 | Status: SHIPPED | OUTPATIENT
Start: 2024-11-22

## 2024-11-22 RX ORDER — LIDOCAINE HYDROCHLORIDE 20 MG/ML
1 JELLY TOPICAL ONCE
Status: COMPLETED | OUTPATIENT
Start: 2024-11-22 | End: 2024-11-22

## 2024-11-22 RX ORDER — KETOROLAC TROMETHAMINE 15 MG/ML
10 INJECTION, SOLUTION INTRAMUSCULAR; INTRAVENOUS ONCE
Status: COMPLETED | OUTPATIENT
Start: 2024-11-22 | End: 2024-11-22

## 2024-11-22 RX ADMIN — SODIUM CHLORIDE 67 ML: 9 INJECTION, SOLUTION INTRAVENOUS at 13:53

## 2024-11-22 RX ADMIN — SODIUM CHLORIDE 1000 ML: 900 INJECTION, SOLUTION INTRAVENOUS at 10:31

## 2024-11-22 RX ADMIN — ONDANSETRON 4 MG: 2 INJECTION, SOLUTION INTRAMUSCULAR; INTRAVENOUS at 10:34

## 2024-11-22 RX ADMIN — KETOROLAC TROMETHAMINE 10 MG: 15 INJECTION, SOLUTION INTRAMUSCULAR; INTRAVENOUS at 10:30

## 2024-11-22 RX ADMIN — LORAZEPAM 0.5 MG: 2 INJECTION INTRAMUSCULAR; INTRAVENOUS at 10:29

## 2024-11-22 RX ADMIN — IOPAMIDOL 95 ML: 755 INJECTION, SOLUTION INTRAVENOUS at 13:52

## 2024-11-22 RX ADMIN — LIDOCAINE HYDROCHLORIDE 1 TUBE: 20 JELLY TOPICAL at 12:55

## 2024-11-22 ASSESSMENT — ACTIVITIES OF DAILY LIVING (ADL)
ADLS_ACUITY_SCORE: 0

## 2024-11-22 NOTE — ED PROVIDER NOTES
Emergency Department Note      History of Present Illness     Chief Complaint   Abdominal Pain, Constipation, and Nausea      HPI   Ravi Dunne is a 34 year old male who presents with nausea, abdominal pain, constipation and rectum pain since yesterday. Mother reports patient was up all last night with abdominal pain and anxiety. Mother explains after coming home from their [facility] appointment yesterday, the patient had his usual Miralax 2x a day, 2 tablets of Senna and she additionally gave him one enema and one docusate suppository. She reports his last BM was at 0700 this morning and it was very small. She also notes starting a new routine medication Reglan yesterday. Patient has been eating and drinking well. She denies fever or vomiting. Reports history hemorrhoid banding and gastroparesis.     Independent Historian   Mother as detailed above.    Review of External Notes   I reviewed the nurse triage note from earlier today.  I reviewed the [facility] note from 8/21/2024.  This noted the patient has longstanding history of intermittent lower abdominal pains every few weekswith associated nausea and vomiting.  Usually his episodes correlate with episodes of constipation.    Past Medical History     Medical History and Problem List   Anxiety  Autism  Hypothyroidism  Cyclic vomiting  Developmental delay  Hyperlipidemia  Mood disorder    Medications   Lexapro  Synthroid  Ativan  Reglan  Prilosec  Trileptal  Seroquel    Physical Exam     Patient Vitals for the past 24 hrs:   BP Pulse Resp SpO2   11/22/24 0946 (!) 177/107 97 18 98 %     Physical Exam  Vitals and nursing note reviewed. Exam conducted with a chaperone present.   Constitutional:       General: He is in acute distress (Mild painful distress).      Appearance: He is not ill-appearing.   HENT:      Head: Normocephalic and atraumatic.      Right Ear: External ear normal.      Left Ear: External ear normal.      Nose: Nose normal.   Eyes:       Conjunctiva/sclera: Conjunctivae normal.   Cardiovascular:      Rate and Rhythm: Normal rate and regular rhythm.      Heart sounds: No murmur heard.  Pulmonary:      Effort: Pulmonary effort is normal. No respiratory distress.      Breath sounds: No wheezing, rhonchi or rales.   Abdominal:      General: Abdomen is flat. There is no distension.      Palpations: Abdomen is soft.      Tenderness: There is no abdominal tenderness. There is no guarding or rebound.   Genitourinary:     Rectum: External hemorrhoid (large inflammed encircling the rectum) present. No mass (no impacted stool palpated).   Musculoskeletal:         General: No swelling or deformity.      Cervical back: Normal range of motion and neck supple.   Skin:     General: Skin is warm and dry.      Findings: No rash.   Neurological:      Mental Status: He is alert and oriented to person, place, and time.   Psychiatric:         Mood and Affect: Mood is anxious.         Behavior: Behavior is agitated.           Diagnostics     Lab Results   Labs Ordered and Resulted from Time of ED Arrival to Time of ED Departure   BASIC METABOLIC PANEL - Abnormal       Result Value    Sodium 131 (*)     Potassium 4.2      Chloride 96 (*)     Carbon Dioxide (CO2) 23      Anion Gap 12      Urea Nitrogen 6.6      Creatinine 0.77      GFR Estimate >90      Calcium 9.7      Glucose 104 (*)    CBC WITH PLATELETS AND DIFFERENTIAL - Abnormal    WBC Count 13.0 (*)     RBC Count        Hemoglobin 14.9      Hematocrit        MCV        MCH        MCHC        RDW        Platelet Count 232      % Neutrophils 81      % Lymphocytes 11      % Monocytes 8      % Eosinophils 0      % Basophils 0      % Immature Granulocytes 0      NRBCs per 100 WBC 0      Absolute Neutrophils 10.6 (*)     Absolute Lymphocytes 1.4      Absolute Monocytes 1.0      Absolute Eosinophils 0.0      Absolute Basophils 0.0      Absolute Immature Granulocytes 0.1      Absolute NRBCs 0.0     ROUTINE UA WITH  MICROSCOPIC REFLEX TO CULTURE - Abnormal    Color Urine Light Yellow      Appearance Urine Clear      Glucose Urine Negative      Bilirubin Urine Negative      Ketones Urine 10 (*)     Specific Gravity Urine 1.005      Blood Urine Negative      pH Urine 7.5 (*)     Protein Albumin Urine Negative      Urobilinogen Urine Normal      Nitrite Urine Negative      Leukocyte Esterase Urine Negative      RBC Urine 1      WBC Urine <1     RBC AND PLATELET MORPHOLOGY    RBC Morphology Confirmed RBC Indices      Platelet Assessment        Value: Automated Count Confirmed. Platelet morphology is normal.       Imaging   CT Abdomen Pelvis w Contrast   Final Result   IMPRESSION:    1.  Moderate wall thickening is seen within the rectum. Findings are   suggestive of proctitis which may be infectious or inflammatory in   nature. An underlying rectal mass is considered to be less likely but   cannot be definitively excluded. If further concern, consider   correlation with colonoscopy.   2.  Mild wall thickening along the urinary bladder is favored to be   due to underdistention. Cystitis could appear similarly. Suggest   correlation with urinalysis.      AYLA HENNING MD            SYSTEM ID:  [id number]          Independent Interpretation   None    ED Course      Medications Administered   Medications   ondansetron (ZOFRAN) injection 4 mg (4 mg Intravenous $Given 11/22/24 1034)   sodium chloride 0.9% BOLUS 1,000 mL (0 mLs Intravenous Stopped 11/22/24 1115)   ketorolac (TORADOL) injection 10 mg (10 mg Intravenous $Given 11/22/24 1030)   LORazepam (ATIVAN) injection 0.5 mg (0.5 mg Intravenous $Given 11/22/24 1029)   morphine (PF) injection 4 mg (4 mg Intravenous Not Given 11/22/24 1450)   lidocaine (XYLOCAINE) 2 % external gel 1 Tube (1 Tube Topical $Given 11/22/24 1255)   iopamidol (ISOVUE-370) solution 95 mL (95 mLs Intravenous $Given 11/22/24 1352)   Saline Flush - CT (67 mLs Intravenous $Given 11/22/24 1353)       Procedures    Procedures     Discussion of Management   None    ED Course        Additional Documentation  None    Medical Decision Making / Diagnosis     CMS Diagnoses: None    MIPS       None    MDM   Ravi Dunne is a 34 year old male who presents with abdominal and rectal pain.  Patient has had frequent similar episodes in the past that are usually correlated with constipation.  He does seem quite uncomfortable on arrival to the ED but is also very anxious.  His abdominal exam seems benign.  His rectal exam shows large inflamed hemorrhoids that are not bleeding currently.  I suspect that patient may be having discomfort from his hemorrhoids.  I did not feel any stool in the rectum itself.  Lab work was performed and showed a mild leukocytosis.  Given limitations in his evaluation due to his underlying autism, we did proceed with a CT to ensure there is no significant intra-abdominal pathology.  There was some concern about possible proctitis on the CT, however the patient had just undergone an enema and I think that this is likely the cause of these findings.  I do not think that he has a significant proctitis at this point.  They did express some concern about possible cystitis so we will check a urinalysis as well.  Otherwise I think we can treat the patient's discomfort with topical lidocaine and Proctofoam and he can follow-up promptly with his GI doctor for further management of his hemorrhoids and chronic constipation.    Disposition   The patient was discharged.     Diagnosis     ICD-10-CM    1. External hemorrhoids  K64.4       2. Rectal pain  K62.89            Discharge Medications   New Prescriptions    HYDROCORTISONE-PRAMOXINE (PROCTOFOAM-HC) 1-1 % RECTAL FOAM    Place 1 Applicatorful rectally 3 times daily.    LIDOCAINE (XYLOCAINE) 5 % EXTERNAL OINTMENT    Place rectally as needed for moderate pain.         Scribe Disclosure:  CHRISTIANO, Andreia Gates, am serving as a scribe at 9:47 AM on 11/22/2024 to document  services personally performed by Mehran Hobbs MD based on my observations and the provider's statements to me.        Mehran Hobbs MD  11/22/24 2419

## 2024-11-22 NOTE — ED TRIAGE NOTES
Pt presents with his mother and guardian who reports he complains of abdominal pain, nausea, and constipation.       Triage Assessment (Adult)       Row Name 11/22/24 0947          Triage Assessment    Airway WDL WDL        Respiratory WDL    Respiratory WDL WDL        Cardiac WDL    Cardiac WDL WDL        Peripheral/Neurovascular WDL    Peripheral Neurovascular WDL WDL        Cognitive/Neuro/Behavioral WDL    Cognitive/Neuro/Behavioral WDL WDL

## 2024-11-23 ENCOUNTER — HOSPITAL ENCOUNTER (EMERGENCY)
Facility: CLINIC | Age: 34
Discharge: HOME | End: 2024-11-24
Attending: EMERGENCY MEDICINE | Admitting: EMERGENCY MEDICINE
Payer: MEDICAID

## 2024-11-23 ENCOUNTER — NURSE TRIAGE (OUTPATIENT)
Dept: NURSING | Facility: CLINIC | Age: 34
End: 2024-11-23
Payer: MEDICAID

## 2024-11-23 DIAGNOSIS — K62.5 RECTAL BLEEDING: ICD-10-CM

## 2024-11-23 DIAGNOSIS — K62.89 PROCTITIS: ICD-10-CM

## 2024-11-23 DIAGNOSIS — K64.9 HEMORRHOIDS, UNSPECIFIED HEMORRHOID TYPE: ICD-10-CM

## 2024-11-23 DIAGNOSIS — R45.1 RESTLESSNESS: ICD-10-CM

## 2024-11-23 LAB
ERYTHROCYTE [DISTWIDTH] IN BLOOD BY AUTOMATED COUNT: 11.9 % (ref 10–15)
HCT VFR BLD AUTO: 33.6 % (ref 40–53)
HGB BLD-MCNC: 12.2 G/DL (ref 13.3–17.7)
MCH RBC QN AUTO: 29.5 PG (ref 26.5–33)
MCHC RBC AUTO-ENTMCNC: 36.3 G/DL (ref 31.5–36.5)
MCV RBC AUTO: 81 FL (ref 78–100)
PLATELET # BLD AUTO: 158 10E3/UL (ref 150–450)
RBC # BLD AUTO: 4.13 10E6/UL (ref 4.4–5.9)
WBC # BLD AUTO: 6.8 10E3/UL (ref 4–11)

## 2024-11-23 PROCEDURE — 85027 COMPLETE CBC AUTOMATED: CPT | Performed by: EMERGENCY MEDICINE

## 2024-11-23 PROCEDURE — 250N000013 HC RX MED GY IP 250 OP 250 PS 637: Performed by: EMERGENCY MEDICINE

## 2024-11-23 PROCEDURE — 99284 EMERGENCY DEPT VISIT MOD MDM: CPT

## 2024-11-23 PROCEDURE — 36415 COLL VENOUS BLD VENIPUNCTURE: CPT | Performed by: EMERGENCY MEDICINE

## 2024-11-23 RX ORDER — LORAZEPAM 1 MG/1
1 TABLET ORAL ONCE
Status: COMPLETED | OUTPATIENT
Start: 2024-11-23 | End: 2024-11-23

## 2024-11-23 RX ORDER — METRONIDAZOLE 500 MG/1
500 TABLET ORAL ONCE
Status: COMPLETED | OUTPATIENT
Start: 2024-11-23 | End: 2024-11-23

## 2024-11-23 RX ORDER — CEFDINIR 300 MG/1
300 CAPSULE ORAL ONCE
Status: COMPLETED | OUTPATIENT
Start: 2024-11-23 | End: 2024-11-23

## 2024-11-23 RX ADMIN — CEFDINIR 300 MG: 300 CAPSULE ORAL at 23:22

## 2024-11-23 RX ADMIN — LORAZEPAM 1 MG: 1 TABLET ORAL at 23:21

## 2024-11-23 RX ADMIN — METRONIDAZOLE 500 MG: 500 TABLET ORAL at 23:22

## 2024-11-23 ASSESSMENT — ACTIVITIES OF DAILY LIVING (ADL)
ADLS_ACUITY_SCORE: 0

## 2024-11-23 ASSESSMENT — COLUMBIA-SUICIDE SEVERITY RATING SCALE - C-SSRS
1. IN THE PAST MONTH, HAVE YOU WISHED YOU WERE DEAD OR WISHED YOU COULD GO TO SLEEP AND NOT WAKE UP?: YES
2. HAVE YOU ACTUALLY HAD ANY THOUGHTS OF KILLING YOURSELF IN THE PAST MONTH?: NO
6. HAVE YOU EVER DONE ANYTHING, STARTED TO DO ANYTHING, OR PREPARED TO DO ANYTHING TO END YOUR LIFE?: NO

## 2024-11-23 NOTE — TELEPHONE ENCOUNTER
"Patient's mom calling, and she has guardianship. Patient was seen at [facility] emergency department yesterday.     Patient was unable to sleep on Thursday night due to pain. Patient complained of his \"butt hurt.\" Patient also has a history of gastroparesis with upper abdominal pain. Mom thought patient was constipated. She tried a Fleets enema and a suppository with no relief. Mom was told patient needs to be seen by a colorectal surgeon. She tried a suppository for hemorrhoids. At noon, patient had a bowel movement with bleeding. Mom states he's had several more bloody bowel movements. Bright red blood. History of hemorrhoids with banding, but there are still some that haven't been treated yet.     Care advice given for patient to be seen in the emergency department at [facility]. She is in agreement.     Zaira Gates RN  [facility]  November 23, 2024, 5:13 PM    Reason for Disposition   SEVERE rectal bleeding (large blood clots; constant or on and off bleeding)    Additional Information   Negative: Shock suspected (e.g., cold/pale/clammy skin, too weak to stand, low BP, rapid pulse)   Negative: Difficult to awaken or acting confused (e.g., disoriented, slurred speech)   Negative: Passed out (i.e., lost consciousness, collapsed and was not responding)   Negative: [1] Vomiting AND [2] contains red blood or black (\"coffee ground\") material  (Exception: Few red streaks in vomit that only happened once.)   Negative: Sounds like a life-threatening emergency to the triager   Negative: Diarrhea is main symptom   Negative: Stool color other than brown or tan is main concern  (no bleeding and no melena)    Protocols used: Rectal Bleeding-A-AH    "

## 2024-11-24 VITALS
SYSTOLIC BLOOD PRESSURE: 117 MMHG | OXYGEN SATURATION: 98 % | RESPIRATION RATE: 22 BRPM | DIASTOLIC BLOOD PRESSURE: 76 MMHG | TEMPERATURE: 97.6 F | HEART RATE: 87 BPM

## 2024-11-24 RX ORDER — BENZOCAINE/MENTHOL 6 MG-10 MG
LOZENGE MUCOUS MEMBRANE 3 TIMES DAILY
Qty: 30 G | Refills: 0 | Status: SHIPPED | OUTPATIENT
Start: 2024-11-24

## 2024-11-24 RX ORDER — METRONIDAZOLE 500 MG/1
500 TABLET ORAL 2 TIMES DAILY
Qty: 14 TABLET | Refills: 0 | Status: SHIPPED | OUTPATIENT
Start: 2024-11-24 | End: 2024-12-01

## 2024-11-24 RX ORDER — CEFDINIR 300 MG/1
300 CAPSULE ORAL 2 TIMES DAILY
Qty: 14 CAPSULE | Refills: 0 | Status: SHIPPED | OUTPATIENT
Start: 2024-11-24 | End: 2024-12-01

## 2024-11-24 NOTE — ED PROVIDER NOTES
Emergency Department Note      History of Present Illness     Chief Complaint  Shaking    HPI  Ravi Dunne is a 34 year old male with history of autism who presents to the ED with his mother for evaluation of shaking. Mother reports patient had blood in the toilet tonight, total of 3 times today after being seen yesterday for rectal pain and being diagnosed with hemorrhoids.  They have started lidocaine but that was the only product the pharmacy had for them.  Patient wasn't able to sleep last night and was shaking. He has been changing between Tylenol and ibuprofen with no relief. She gave a enema yesterday and suppository today. Patient was given a prescription of lidocaine ointment when he was seen yesterday, also proctofoam but it sounds like they do not have this at home.    Independent Historian  Mother as detailed above.    Review of External Notes  Visit from yesterday including CT scan showing proctitis.  Favored to be related to enemas at that time    Past Medical History   Medical History and Problem List   Anxiety  Autism  Hypothyroidism  Cyclic vomiting  Developmental delay  Hyperlipidemia  Mood disorder    Medications   Lecapro  Levothyroxine  Ativan  Reglan   Prilosec  Trileptal  Seroquel  Physical Exam   Patient Vitals for the past 24 hrs:   BP Temp Temp src Pulse Resp SpO2   11/23/24 1758 (!) 168/114 97.6  F (36.4  C) Temporal 90 22 98 %     Physical Exam  Resp:               Non-labored  Neuro:             Alert and cooperative, restless legs, asks multiple times for us to check for the power cord they left here yesterday  MSkel:             Moving all extremities  Skin:                No rash  GI:                   No abdominal tenderness.  No blood perianally.  No visible external hemorrhoids.  With gentle traction on anal tissue, hemorrhoids visible from just within anus.  No bright red blood.  Diagnostics   Lab Results   Labs Ordered and Resulted from Time of ED Arrival to Time of ED  Departure   CBC WITH PLATELETS - Abnormal       Result Value    WBC Count 6.8      RBC Count 4.13 (*)     Hemoglobin 12.2 (*)     Hematocrit 33.6 (*)     MCV 81      MCH 29.5      MCHC 36.3      RDW 11.9      Platelet Count 158         ED Course    Medications Administered  Medications   cefdinir (OMNICEF) capsule 300 mg (has no administration in time range)   metroNIDAZOLE (FLAGYL) tablet 500 mg (has no administration in time range)   LORazepam (ATIVAN) tablet 1 mg (has no administration in time range)     Procedures  Procedures     Discussion of Management  None    Additional Documentation  None    ED Course  ED Course as of 11/24/24 1728   Sat Nov 23, 2024   2255 I obtained history and examined the patient as noted above.    Sun Nov 24, 2024   0020 I rechecked the patient and explained findings. I prepared the patient to be discharged home.      Medical Decision Making / Diagnosis   CMS Diagnoses: None    MIPS     None    MDM  Difficult to compare the CBC from today to yesterday as all numbers are lower.  My suspicion is that he is more hydrated today and yesterday's was hemoconcentrated.  There is no evidence for vital sign instability or peritonitis.  He has hemorrhoids on exam and will benefit from follow[facility]up with [facility] in clinic information was provided.  With persistent rectal pain and proctitis on the CT scan, it is reasonable to consider antibiotic treatment of this.  There was no evidence for abscess on exam or on the CT scan.  Return immediately if pain is increasing, he develops fevers, or symptoms of anemia.    Disposition  The patient was discharged.     ICD-10 Codes:    ICD-10-CM    1. Rectal bleeding  K62.5       2. Hemorrhoids, unspecified hemorrhoid type  K64.9       3. Proctitis  K62.89       4. Restlessness  R45.1          Discharge Medications  Discharge Medication List as of 11/24/2024 12:20 AM        START taking these medications    Details   cefdinir (OMNICEF) 300 MG  capsule Take 1 capsule (300 mg) by mouth 2 times daily for 7 days., Disp-14 capsule, R-0, E-Prescribe      hydrocortisone (CORTAID) 1 % external cream Apply topically 3 times daily. To hemorrhoids while painfulDisp-30 g, [id number] H or similar concentration, only if the previously prescribed Proctofoam product is not available      metroNIDAZOLE (FLAGYL) 500 MG tablet Take 1 tablet (500 mg) by mouth 2 times daily for 7 days., Disp-14 tablet, R-0, E-Prescribe           Scribe Disclosure:  IMaria Esther, am serving as a scribe at 10:59 PM on 11/23/2024 to document services personally performed by Katya Nielsen MD based on my observations and the provider's statements to me.      Katya Nielsen MD  11/24/24 0454

## 2024-11-24 NOTE — ED TRIAGE NOTES
Pt was in the ED yesterday for constipation. Pt had enema yesterday. Mom gave pt suppository today and pt had BM and there was blood in the toilet. Pt c/o abd pain. Pt was told yesterday that he has inflamed hemorrhoids. Pt has been shaking legs since he got home     Triage Assessment (Adult)       Row Name 11/23/24 5244          Triage Assessment    Airway WDL WDL        Respiratory WDL    Respiratory WDL WDL        Skin Circulation/Temperature WDL    Skin Circulation/Temperature WDL WDL        Cardiac WDL    Cardiac WDL WDL        Peripheral/Neurovascular WDL    Peripheral Neurovascular WDL WDL        Cognitive/Neuro/Behavioral WDL    Cognitive/Neuro/Behavioral WDL WDL

## 2024-11-25 ENCOUNTER — TRANSFERRED RECORDS (OUTPATIENT)
Dept: HEALTH INFORMATION MANAGEMENT | Facility: CLINIC | Age: 34
End: 2024-11-25
Payer: MEDICAID

## 2024-12-16 ENCOUNTER — OFFICE VISIT (OUTPATIENT)
Dept: INTERNAL MEDICINE | Facility: CLINIC | Age: 34
End: 2024-12-16
Payer: MEDICAID

## 2024-12-16 VITALS
HEART RATE: 82 BPM | RESPIRATION RATE: 16 BRPM | SYSTOLIC BLOOD PRESSURE: 110 MMHG | DIASTOLIC BLOOD PRESSURE: 74 MMHG | TEMPERATURE: 98.6 F | WEIGHT: 187.4 LBS | OXYGEN SATURATION: 97 % | BODY MASS INDEX: 22.22 KG/M2

## 2024-12-16 DIAGNOSIS — K64.8 INTERNAL HEMORRHOID, BLEEDING: Primary | ICD-10-CM

## 2024-12-16 DIAGNOSIS — R11.15 INTRACTABLE CYCLICAL VOMITING WITH NAUSEA: ICD-10-CM

## 2024-12-16 DIAGNOSIS — Z23 HIGH PRIORITY FOR COVID-19 VACCINATION: ICD-10-CM

## 2024-12-16 DIAGNOSIS — Z23 NEED FOR INFLUENZA VACCINATION: ICD-10-CM

## 2024-12-16 DIAGNOSIS — K59.00 CONSTIPATION, UNSPECIFIED CONSTIPATION TYPE: ICD-10-CM

## 2024-12-16 DIAGNOSIS — F39 EPISODIC MOOD DISORDER (H): ICD-10-CM

## 2024-12-16 DIAGNOSIS — E06.3 AUTOIMMUNE HYPOTHYROIDISM: ICD-10-CM

## 2024-12-16 PROBLEM — R10.84 GENERALIZED ABDOMINAL PAIN: Status: ACTIVE | Noted: 2024-12-16

## 2024-12-16 PROBLEM — K62.5 HEMORRHAGE OF RECTUM AND ANUS: Status: ACTIVE | Noted: 2023-01-13

## 2024-12-16 PROBLEM — R63.4 ABNORMAL WEIGHT LOSS: Status: ACTIVE | Noted: 2021-06-01

## 2024-12-16 PROBLEM — K21.9 GASTROESOPHAGEAL REFLUX DISEASE WITHOUT ESOPHAGITIS: Status: ACTIVE | Noted: 2024-12-16

## 2024-12-16 PROBLEM — K64.4 EXTERNAL HEMORRHOIDS: Status: ACTIVE | Noted: 2024-12-16

## 2024-12-16 PROBLEM — K92.2 GASTROINTESTINAL HEMORRHAGE: Status: ACTIVE | Noted: 2024-12-16

## 2024-12-16 PROCEDURE — 91320 SARSCV2 VAC 30MCG TRS-SUC IM: CPT | Performed by: INTERNAL MEDICINE

## 2024-12-16 PROCEDURE — 90480 ADMN SARSCOV2 VAC 1/ONLY CMP: CPT | Performed by: INTERNAL MEDICINE

## 2024-12-16 PROCEDURE — 99213 OFFICE O/P EST LOW 20 MIN: CPT | Mod: 25 | Performed by: INTERNAL MEDICINE

## 2024-12-16 PROCEDURE — 90656 IIV3 VACC NO PRSV 0.5 ML IM: CPT | Performed by: INTERNAL MEDICINE

## 2024-12-16 PROCEDURE — 90471 IMMUNIZATION ADMIN: CPT | Performed by: INTERNAL MEDICINE

## 2024-12-16 RX ORDER — AMOXICILLIN 250 MG
2 CAPSULE ORAL AT BEDTIME
Qty: 60 TABLET | Refills: 11 | Status: SHIPPED | OUTPATIENT
Start: 2024-12-16

## 2024-12-16 ASSESSMENT — PAIN SCALES - GENERAL: PAINLEVEL_OUTOF10: NO PAIN (0)

## 2024-12-16 NOTE — PROGRESS NOTES
Assessment & Plan     (K64.8) Internal hemorrhoid, bleeding  (primary encounter diagnosis)  Comment: Discussed hemorrhoids in detail, including pathophysiology, their cause by difficult elimination. conservative treatments starting with increased water and dietary fiber (friuts/vegetables/bran/psyllium), conservative treatments such as Preparation H, Anusol, and even topical benzocaine oint.  Discussed point of intervention and referral to colorectal surgeons as indicated by symptoms.   Plan:     (F39) Episodic mood disorder (H)  Comment: Known issue that I take into account for their medical decisions, managed by specialist.    Continue current therapy as directed by their specialist(s).   Plan:     (R11.15) Intractable cyclical vomiting with nausea  Comment: This condition is currently controlled on the current medical regimen.  Continue current therapy.   Plan:     (K59.00) Constipation, unspecified constipation type  Comment: Reviewed constipation and colon function in detail, including the importance of fiber and water.  Recommended 6-8 glasses of water per day at bare minimum and a daily fiber product of some kind, either psylllium powder, fiber capsules or biscuits, or prunes, vegetables, etc.  If adequate ammounts of fiber and water are not successful in relieving constipation, then will proceed with stool softners, colace, sennekot as needed.    Strongly recommended Miralax daily.    If resistant to all of these, will need either ACBE or colonoscopy to evaluate colon anatomy and to rule out obstrucing process.    Plan: senna-docusate (STIMULANT LAXATIVE) 8.6-50 MG         tablet            (E06.3) Autoimmune hypothyroidism  Comment: This condition is currently controlled on the current medical regimen.  Continue current therapy.   Plan:     (Z23) High priority for COVID-19 vaccination  Comment:   Plan: COVID-19 12+ (PFIZER)            (Z23) Need for influenza vaccination  Comment:   Plan: INFLUENZA  VACCINE,SPLIT         VIRUS,TRIVALENT,PF(FLUZONE)                           Emma Rodrigues is a 34 year old, presenting for the following health issues:  Gastrointestinal Problem (Gastroparesis)        12/16/2024     8:42 AM   Additional Questions   Roomed by Marcella BENTLEY CMA   Accompanied by Mom     HPI     Recent visits to the ER for acute reacal bleeding from hemorrhoids.   No melena.   Seen by [facility].   Also followed by [facility]  Mom not completely sure about his bowel movement frequency and consistency since he flusehs before she can see.   His ability to provide accurate information on his symptoms is imparied due to his cognitive issues.        Reviewed all ER and speciality clinic notes.     2.)  thyroid disees, taking thyroid meds as ordered.   Labs are .lutd     3.)  episodic mood disorder, followed by psychaitry.     **I reviewed the information recorded in the patient's EPIC chart (including but not limited to medical history, surgical history, family history, problem list, medication list, and allergy list) and updated the information as indicated based on the patients reported information.         Review of Systems  Constitutional, neuro, ENT, endocrine, pulmonary, cardiac, gastrointestinal, genitourinary, musculoskeletal, integument and psychiatric systems are negative, except as otherwise noted.      Objective    /74   Pulse 82   Temp 98.6  F (37  C) (Tympanic)   Resp 16   Wt 85 kg (187 lb 6.4 oz)   SpO2 97%   BMI 22.22 kg/m    Body mass index is 22.22 kg/m .  Physical Exam   GENERAL alert and no distress  EYES:  Normal sclera,conjunctiva, EOMI  HENT: oral and posterior pharynx without lesions or erythema, facies symmetric  NECK: Neck supple. No LAD, without thyroidmegaly.  RESP: Clear to ausculation bilaterally without wheezes or crackles. Normal BS in all fields.  CV: RRR normal S1S2 without murmurs, rubs or gallops.  LYMPH: no cervical lymph adenopathy appreciated  MS:  extremities- no gross deformities of the visible extremities noted,   EXT:  no lower extremity edema  PSYCH: Alert and oriented times 3; speech- coherent  SKIN:  No obvious significant skin lesions on visible portions of face             Signed Electronically by: Bhaskar Gandara MD

## 2024-12-30 ENCOUNTER — TRANSFERRED RECORDS (OUTPATIENT)
Dept: HEALTH INFORMATION MANAGEMENT | Facility: CLINIC | Age: 34
End: 2024-12-30
Payer: MEDICAID

## 2025-01-06 DIAGNOSIS — E06.3 AUTOIMMUNE HYPOTHYROIDISM: ICD-10-CM

## 2025-01-06 RX ORDER — LEVOTHYROXINE SODIUM 125 UG/1
TABLET ORAL
Qty: 90 TABLET | Refills: 0 | Status: SHIPPED | OUTPATIENT
Start: 2025-01-06

## 2025-02-18 ENCOUNTER — OFFICE VISIT (OUTPATIENT)
Dept: INTERNAL MEDICINE | Facility: CLINIC | Age: 35
End: 2025-02-18
Payer: MEDICAID

## 2025-02-18 VITALS
BODY MASS INDEX: 22.1 KG/M2 | DIASTOLIC BLOOD PRESSURE: 80 MMHG | HEART RATE: 80 BPM | TEMPERATURE: 98.7 F | RESPIRATION RATE: 22 BRPM | SYSTOLIC BLOOD PRESSURE: 118 MMHG | OXYGEN SATURATION: 98 % | WEIGHT: 186.4 LBS

## 2025-02-18 DIAGNOSIS — E06.3 AUTOIMMUNE HYPOTHYROIDISM: ICD-10-CM

## 2025-02-18 DIAGNOSIS — F39 EPISODIC MOOD DISORDER: ICD-10-CM

## 2025-02-18 DIAGNOSIS — J30.2 SEASONAL ALLERGIC RHINITIS, UNSPECIFIED TRIGGER: ICD-10-CM

## 2025-02-18 DIAGNOSIS — E78.5 HYPERLIPIDEMIA LDL GOAL <160: ICD-10-CM

## 2025-02-18 DIAGNOSIS — H61.22 IMPACTED CERUMEN OF LEFT EAR: ICD-10-CM

## 2025-02-18 DIAGNOSIS — K64.8 INTERNAL HEMORRHOIDS: ICD-10-CM

## 2025-02-18 DIAGNOSIS — R11.15 INTRACTABLE CYCLICAL VOMITING WITH NAUSEA: ICD-10-CM

## 2025-02-18 DIAGNOSIS — F70 MILD INTELLECTUAL DISABILITY: ICD-10-CM

## 2025-02-18 DIAGNOSIS — F84.0 AUTISM: ICD-10-CM

## 2025-02-18 DIAGNOSIS — Z13.6 CARDIOVASCULAR SCREENING; LDL GOAL LESS THAN 130: ICD-10-CM

## 2025-02-18 DIAGNOSIS — Z00.01 ENCOUNTER FOR ROUTINE ADULT MEDICAL EXAM WITH ABNORMAL FINDINGS: Primary | ICD-10-CM

## 2025-02-18 DIAGNOSIS — R11.15 CYCLIC VOMITING SYNDROME: ICD-10-CM

## 2025-02-18 DIAGNOSIS — R62.50 DEVELOPMENT DELAY: ICD-10-CM

## 2025-02-18 LAB
ALT SERPL W P-5'-P-CCNC: 32 U/L (ref 0–70)
ANION GAP SERPL CALCULATED.3IONS-SCNC: 12 MMOL/L (ref 7–15)
BUN SERPL-MCNC: 12 MG/DL (ref 6–20)
CALCIUM SERPL-MCNC: 9.7 MG/DL (ref 8.8–10.4)
CHLORIDE SERPL-SCNC: 99 MMOL/L (ref 98–107)
CREAT SERPL-MCNC: 0.89 MG/DL (ref 0.67–1.17)
EGFRCR SERPLBLD CKD-EPI 2021: >90 ML/MIN/1.73M2
ERYTHROCYTE [DISTWIDTH] IN BLOOD BY AUTOMATED COUNT: 11.8 % (ref 10–15)
GLUCOSE SERPL-MCNC: 83 MG/DL (ref 70–99)
HCO3 SERPL-SCNC: 26 MMOL/L (ref 22–29)
HCT VFR BLD AUTO: 39.5 % (ref 40–53)
HGB BLD-MCNC: 14 G/DL (ref 13.3–17.7)
MCH RBC QN AUTO: 29.2 PG (ref 26.5–33)
MCHC RBC AUTO-ENTMCNC: 35.4 G/DL (ref 31.5–36.5)
MCV RBC AUTO: 83 FL (ref 78–100)
PLATELET # BLD AUTO: 179 10E3/UL (ref 150–450)
POTASSIUM SERPL-SCNC: 4.4 MMOL/L (ref 3.4–5.3)
RBC # BLD AUTO: 4.79 10E6/UL (ref 4.4–5.9)
SODIUM SERPL-SCNC: 137 MMOL/L (ref 135–145)
TSH SERPL DL<=0.005 MIU/L-ACNC: 1.12 UIU/ML (ref 0.3–4.2)
WBC # BLD AUTO: 5.3 10E3/UL (ref 4–11)

## 2025-02-18 PROCEDURE — 80048 BASIC METABOLIC PNL TOTAL CA: CPT | Performed by: INTERNAL MEDICINE

## 2025-02-18 PROCEDURE — 85027 COMPLETE CBC AUTOMATED: CPT | Performed by: INTERNAL MEDICINE

## 2025-02-18 PROCEDURE — 99395 PREV VISIT EST AGE 18-39: CPT | Performed by: INTERNAL MEDICINE

## 2025-02-18 PROCEDURE — 84460 ALANINE AMINO (ALT) (SGPT): CPT | Performed by: INTERNAL MEDICINE

## 2025-02-18 PROCEDURE — 84443 ASSAY THYROID STIM HORMONE: CPT | Performed by: INTERNAL MEDICINE

## 2025-02-18 PROCEDURE — 36415 COLL VENOUS BLD VENIPUNCTURE: CPT | Performed by: INTERNAL MEDICINE

## 2025-02-18 RX ORDER — OMEPRAZOLE 20 MG/1
20 CAPSULE, DELAYED RELEASE ORAL DAILY
Qty: 90 CAPSULE | Refills: 3 | Status: SHIPPED | OUTPATIENT
Start: 2025-02-18

## 2025-02-18 RX ORDER — LEVOTHYROXINE SODIUM 125 UG/1
125 TABLET ORAL DAILY
Qty: 90 TABLET | Refills: 3 | Status: SHIPPED | OUTPATIENT
Start: 2025-02-18

## 2025-02-18 RX ORDER — CETIRIZINE HYDROCHLORIDE 10 MG/1
10 TABLET ORAL DAILY
Qty: 90 TABLET | Refills: 3 | Status: SHIPPED | OUTPATIENT
Start: 2025-02-18

## 2025-02-18 SDOH — HEALTH STABILITY: PHYSICAL HEALTH: ON AVERAGE, HOW MANY DAYS PER WEEK DO YOU ENGAGE IN MODERATE TO STRENUOUS EXERCISE (LIKE A BRISK WALK)?: 1 DAY

## 2025-02-18 SDOH — HEALTH STABILITY: PHYSICAL HEALTH: ON AVERAGE, HOW MANY MINUTES DO YOU ENGAGE IN EXERCISE AT THIS LEVEL?: 30 MIN

## 2025-02-18 ASSESSMENT — PAIN SCALES - GENERAL: PAINLEVEL_OUTOF10: NO PAIN (0)

## 2025-02-18 ASSESSMENT — SOCIAL DETERMINANTS OF HEALTH (SDOH): HOW OFTEN DO YOU GET TOGETHER WITH FRIENDS OR RELATIVES?: ONCE A WEEK

## 2025-02-18 NOTE — PROGRESS NOTES
Preventive Care Visit  [facility]  Bhaskar Gandara MD, Internal Medicine  Feb 18, 2025      Assessment & Plan     (Z00.01) Encounter for routine adult medical exam with abnormal findings  (primary encounter diagnosis)  Comment: Discussed cardiac disease risk factor modification including screening, preventing, and treating hypertension, elevated lipids, diabetes, and smoking cessation.    Discussed age appropriate cancer screening recommendations as dictated by age group and past medical history.    Recommended making better food choices as often as possible, including lower carb, lower fat, lower salt diet and moderation in any alcohol intake.    Recommended maintaining regular physical activity/exercise throughout their lifetime.  Recommended safety and injury prevention (i.e. seatbelt use, safety equipment like helmets when biking, etc).    Reviewed preventive health counseling, as reflected in patient instructions       Regular exercise       Healthy diet/nutrition       Vision screening       Hearing screening       Immunizations  up to date            Colorectal cancer screening starting age 45       Prostate cancer screening starting age 45  Plan: REVIEW OF HEALTH MAINTENANCE PROTOCOL ORDERS            (Z13.6) CARDIOVASCULAR SCREENING; LDL GOAL LESS THAN 130  Comment: Discussed cardiac disease risk factors and cardiac disease risk factor modification.   Plan: REVIEW OF HEALTH MAINTENANCE PROTOCOL ORDERS,         CBC with platelets, Basic metabolic panel, TSH         with free T4 reflex, ALT            (E78.5) Hyperlipidemia LDL goal <160  Comment: This condition is currently controlled on the current medical regimen.  Continue current therapy.   Plan: REVIEW OF HEALTH MAINTENANCE PROTOCOL ORDERS,         CBC with platelets, Basic metabolic panel, TSH         with free T4 reflex, ALT            (E06.3) Autoimmune hypothyroidism  Comment: This condition is currently controlled on  the current medical regimen.  Continue current therapy.   Recheck labs, titrate accordingly.   Plan: REVIEW OF HEALTH MAINTENANCE PROTOCOL ORDERS,         levothyroxine (SYNTHROID/LEVOTHROID) 125 MCG         tablet, CBC with platelets, Basic metabolic         panel, TSH with free T4 reflex, ALT            (R11.15) Intractable cyclical vomiting with nausea  Comment: This condition is currently controlled on the current medical regimen.  Continue current therapy.   Plan: REVIEW OF HEALTH MAINTENANCE PROTOCOL ORDERS,         omeprazole (PRILOSEC) 20 MG DR capsule, CBC         with platelets, Basic metabolic panel, TSH with        free T4 reflex, ALT            (J30.2) Seasonal allergic rhinitis, unspecified trigger  Comment: This condition is currently controlled on the current medical regimen.  Continue current therapy.   Plan: cetirizine (ZYRTEC) 10 MG tablet            (H61.22) Impacted cerumen of left ear  Comment: offered irrigation by staff in office, the declined today.   Trial of eaer wax drops, then gentle irrigation at home.   Refer to ENT if needed.   Plan: REVIEW OF HEALTH MAINTENANCE PROTOCOL ORDERS            (F70) Mild intellectual disability  Comment: Known issue that I take into account for their medical decisions, managed by specialist.    Continue current therapy as directed by their specialist(s).   Plan:     (R62.50) Development delay  Comment: Known issue that I take into account for their medical decisions, managed by specialist.    Continue current therapy as directed by their specialist(s).   Plan:     (F39) Episodic mood disorder  Comment: Known issue that I take into account for their medical decisions, managed by specialist.    Continue current therapy as directed by their specialist(s).   Plan:     (R11.15) Cyclic vomiting syndrome  Comment:   Plan:     (F84.0) Autism  Comment: Known issue that I take into account for their medical decisions, managed by specialist.    Continue current  therapy as directed by their specialist(s).   Plan:     (K64.8) Internal hemorrhoids  Comment: Discussed hemorrhoids in detail, including pathophysiology, their cause by difficult elimination. conservative treatments starting with increased water and dietary fiber (friuts/vegetables/bran/psyllium), conservative treatments such as Preparation H, Anusol, and even topical benzocaine oint.  Discussed point of intervention and referral to colorectal surgeons as indicated by symptoms.   Plan:        Patient has been advised of split billing requirements and indicates understanding: Yes        Counseling  Appropriate preventive services were addressed with this patient via screening, questionnaire, or discussion as appropriate for fall prevention, nutrition, physical activity, Tobacco-use cessation, social engagement, weight loss and cognition.  Checklist reviewing preventive services available has been given to the patient.  Reviewed patient's diet, addressing concerns and/or questions.   He is at risk for lack of exercise and has been provided with information to increase physical activity for the benefit of his well-being.   He is at risk for psychosocial distress and has been provided with information to reduce risk.           Emma Rodrigues is a 34 year old, presenting for the following:  Medicare Visit        2/18/2025     8:13 AM   Additional Questions   Roomed by Marcella BENTLEY CMA          HPI    1.)  recent internal hemorrhoids. See by CRS and had banding.   Bowel regimen discussed.     2.)  occ episodes of cyclical vomiting syndrome.   Has been to ER many times, many GI follow up visits.       3.)  History of hypothyroidism.  On replacement therapy.  He has not experienced any significant side effects of this medication.   The patient denies of fatigue, weight changes, heat/cold intolerance, hair changes, nail changes, bowel changes.     Latest labs reviewed:    Lab Results   Component Value Date    TSH 1.84  06/19/2024    TSH 3.21 11/06/2021    TSH 0.45 07/06/2020    TSH 0.38 04/03/2019    TSH 0.80 05/02/2018    TSH 1.98 11/15/2017    TSH 8.48 08/11/2017    TSH 7.47 02/14/2017    TSH 1.17 06/24/2014    TSH 2.80 02/02/2010    TSH 2.52 04/23/2009           4.)  psychiatry managed his psychiatric medications.       Health Care Directive  Patient has a Health Care Directive on file        2/18/2025   General Health   How would you rate your overall physical health? (!) FAIR   Feel stress (tense, anxious, or unable to sleep) Rather much   (!) STRESS CONCERN      2/18/2025   Nutrition   Three or more servings of calcium each day? Yes   Diet: Other   If other, please elaborate: he has gastropareses so his meals are smaller but more frequent   How many servings of fruit and vegetables per day? (!) 2-3   How many sweetened beverages each day? 0-1         2/18/2025   Exercise   Days per week of moderate/strenous exercise 1 day   Average minutes spent exercising at this level 30 min   (!) EXERCISE CONCERN      2/18/2025   Social Factors   Frequency of gathering with friends or relatives Once a week   Worry food won't last until get money to buy more No   Food not last or not have enough money for food? No   Do you have housing? (Housing is defined as stable permanent housing and does not include staying ouside in a car, in a tent, in an abandoned building, in an overnight shelter, or couch-surfing.) Yes   Are you worried about losing your housing? No   Lack of transportation? No   Unable to get utilities (heat,electricity)? No         2/18/2025   Dental   Dentist two times every year? (!) DECLINE            Today's PHQ-2 Score:       2/18/2025     8:17 AM   PHQ-2 ( 1999 [employer])   PHQ-2 Score Incomplete           2/18/2025   Substance Use   Alcohol more than 3/day or more than 7/wk Not Applicable   Do you use any other substances recreationally? No     Social History     Tobacco Use    Smoking status: Never    Smokeless tobacco:  "Never   Substance Use Topics    Alcohol use: No    Drug use: No           2/18/2025   STI Screening   New sexual partner(s) since last STI/HIV test? (!) DECLINE         2/18/2025   Contraception/Family Planning   Questions about contraception or family planning (!) DECLINE        Reviewed and updated as needed this visit by Provider                      **I reviewed the information recorded in the patient's EPIC chart (including but not limited to medical history, surgical history, family history, problem list, medication list, and allergy list) and updated the information as indicated based on the patients reported information.         Review of Systems  Constitutional, neuro, ENT, endocrine, pulmonary, cardiac, gastrointestinal, genitourinary, musculoskeletal, integument and psychiatric systems are negative, except as otherwise noted.     Objective    Exam  /80   Pulse 80   Temp 98.7  F (37.1  C) (Temporal)   Resp 22   Wt 84.6 kg (186 lb 6.4 oz)   SpO2 98%   BMI 22.10 kg/m     Estimated body mass index is 22.1 kg/m  as calculated from the following:    Height as of 8/20/24: 1.956 m (6' 5\").    Weight as of this encounter: 84.6 kg (186 lb 6.4 oz).    Physical Exam  Physical Exam  Vitals and nursing note reviewed.   Constitutional:       Comments: Moves normally, alert, no apparent distress  HENT:      Head: Normocephalic and atraumatic.      Nose: Nose normal.   Eyes:      Extraocular Movements: Extraocular movements intact.   Ears:  right canal and TM elieser  Left canal completely occluded with black wax  Cardiovascular:      Rate and Rhythm: Normal rate and regular rhythm.      Heart sounds: Normal heart sounds.   Pulmonary:      Effort: Pulmonary effort is normal.      Breath sounds: Normal breath sounds.   Abdominal:      General: There is no distension.      Palpations: There is no mass.      Tenderness: No abdominal tenderness, no distention.     Bowel sounds: normal  Musculoskeletal:         " General: Normal range of motion, moves into the room normal, moves in and out of chair normally.    Skin:     General: Skin is warm and dry.   Neurological:      General: No focal deficit present.      Mental Status: Alert, responds to questions, Speech coherent  Psychiatric:         Mood and Affect: speech normal for him, answers basic questions.         Signed Electronically by: Bhaskar Gandara MD

## 2025-02-18 NOTE — PATIENT INSTRUCTIONS
"     We are rechecking your labs.  I will let you know if the results indicate any changes in your therapy.  Unless you hear otherwise, continue all medications at the same doses.  Contact your usual pharmacy if you need refills.     Assuming your labs look good, then continue all medications at the same doses.  Contact your usual pharmacy if you need refills.          Return to see me in 1 year, schedule a follow up visit sooner if needed for anything else.  Use [employer] or Call [phone] to schedule the appointment with me.         5 GOALS TO PREVENT VASCULAR DISEASE:     1.  Aggressive blood pressure control, under 130/80 ideally.  Using medications if needed.    Your blood pressure is under good control    BP Readings from Last 4 Encounters:   02/18/25 118/80   12/16/24 110/74   11/24/24 117/76   11/22/24 138/82       2.  Aggressive LDL cholesterol (\"bad cholesterol\") lowering as indicated.    Your goal is an LDL under 130 for sure, preferably under 100.  (If you have diabetes or previous vascular disease, the the LDL goals would be under 100 for sure, preferably under 70.)    New guidelines identify four high-risk groups who could benefit from statins:   *people with pre-existing heart disease, such as those who have had a heart attack;   *people ages 40 to 75 who have diabetes of any type  *patients ages 40 to 75 with at least a 7.5% risk of developing cardiovascular disease over the next decade, according to a formula described in the guidelines  *patients with the sort of super-high cholesterol that sometimes runs in families, as evidenced by an LDL of 190 milligrams per deciliter or higher    Your cholesterol levels are well controlled.    Recent Labs   Lab Test 06/19/24  0807 04/03/19  0852   CHOL 170 189   HDL 51 39*   * 122*   TRIG 75 140       --LDL (\"bad\" cholesterol): normal under 130, ideal under 100.  Best way to lower this is through better food choices ( lower fats, etc.), medication may " "be considered if indicated  --HDL (\"good\") cholesterol: (normal above 40 for men, above 50 for women).  Best way to improve the HDL level is through regular physical exercise.  There are no medications to raise HDL levels.   --Triglycerides (desirable under 150): triglycerides are more about metabolic issues, best way to improve this is through better diet choices, emphasizing reducing the intake of \"simple carbohydrates\" (e.g. White bread, white rice, pasta, noodles, potatoes, snack foods, regular soda, juices (except fresh squeezed), cakes, cookies, candy, etc.) as best possible. Medications may be considered for triglyceride levels routinely above 400.    3.  Aggressive diabetic prevention, screening and/or management.      You do not have diabetes as of the most recent blood tests.     4.  No smoking    5.  Consider daily preventative aspirin over age 50 if you have enough cardiac risk factors to place you at higher risk for the presence of vascular disease.    If you have any reason not to take aspirin such easy bruising or bleeding, stomach problems, other anticoagulant medications, or any other side effects, then you should not take Aspirin.     --Based on your current cardiac disease risk profile and/or age over 75, you do NOT need to take daily preventative aspirin.            Preventive Health Recommendations  Male Ages 26 - 45    Yearly exam:             See your health care provider every year in order to:  Review health changes in your medical history or your family medical history  Review and reassess any ongoing chronic medical conditions  Discuss preventive care.    Identify and aggressively manage any vascular disease risk factors (including blood pressure, cholesterol, diabetes)  Review and renew any prescription medications if you take prescription medications.  Consider STD testing if you are at risk.  Cholesterol testing starting at age 25. If you are at risk for heart disease, have your " "cholesterol tested at least every 5 years.   Diabetes screening every 3 years, if you are at risk for diabetes, then diabetic screening should be done annually.  Colon cancer screening normally begins at age 45, but starting at age 35 if you have a family history of colon cancer below the age of 50.    Shots:   Get the annual influenza vaccine (flu shot) each fall.   Get a tetanus shot every 10 years.   Covid vaccines are now recommended annually.  Get the most updated Covid vaccine when it becomes available, consider getting this at the same time as the annual influenza vaccine.    Nutrition:  Eat at least 5 servings of fruits and vegetables daily.   Eat whole-grain bread, whole-wheat pasta and brown rice instead of white grains and rice.   Talk to your provider about Calcium and Vitamin D.        --Good Grains:  Oats, brown rice, Quinoa (these do not raise the blood sugar as much)     --Bad grains:  Anything made from wheat or white rice     (because these raise the blood sugars significantly, and the possible gluten issue from wheat for some people).      --Proteins:  Aim for \"lean proteins\" including chicken, fish, seafood, pork, turkey, and eggs (in moderation); Eat red meat only occasionally        Lifestyle  Exercise for at least 150 minutes a week (30 minutes a day, 5 days a week). This will help you control your weight and prevent disease.   Limit alcohol to one drink per day.   Always use condoms for STD prevention, until you are in a committed monogamous relationship  No smoking.   Wear sunscreen to prevent skin cancer.   See your dentist every six months for an exam and cleaning.       "

## 2025-02-22 ENCOUNTER — HOSPITAL ENCOUNTER (EMERGENCY)
Facility: CLINIC | Age: 35
Discharge: HOME | End: 2025-02-22
Attending: EMERGENCY MEDICINE | Admitting: EMERGENCY MEDICINE
Payer: MEDICAID

## 2025-02-22 ENCOUNTER — APPOINTMENT (OUTPATIENT)
Dept: CT IMAGING | Facility: CLINIC | Age: 35
End: 2025-02-22
Attending: EMERGENCY MEDICINE
Payer: MEDICAID

## 2025-02-22 VITALS
OXYGEN SATURATION: 99 % | RESPIRATION RATE: 18 BRPM | SYSTOLIC BLOOD PRESSURE: 130 MMHG | TEMPERATURE: 97 F | HEART RATE: 91 BPM | DIASTOLIC BLOOD PRESSURE: 73 MMHG

## 2025-02-22 DIAGNOSIS — K52.9 GASTROENTERITIS: ICD-10-CM

## 2025-02-22 DIAGNOSIS — R11.2 NAUSEA AND VOMITING, UNSPECIFIED VOMITING TYPE: ICD-10-CM

## 2025-02-22 LAB
ALBUMIN SERPL BCG-MCNC: 4.7 G/DL (ref 3.5–5.2)
ALP SERPL-CCNC: 93 U/L (ref 40–150)
ALT SERPL W P-5'-P-CCNC: 42 U/L (ref 0–70)
ANION GAP SERPL CALCULATED.3IONS-SCNC: 12 MMOL/L (ref 7–15)
AST SERPL W P-5'-P-CCNC: 23 U/L (ref 0–45)
BASOPHILS # BLD AUTO: 0 10E3/UL (ref 0–0.2)
BASOPHILS NFR BLD AUTO: 0 %
BILIRUB SERPL-MCNC: 0.7 MG/DL
BUN SERPL-MCNC: 16.3 MG/DL (ref 6–20)
CALCIUM SERPL-MCNC: 9.6 MG/DL (ref 8.8–10.4)
CHLORIDE SERPL-SCNC: 97 MMOL/L (ref 98–107)
CREAT SERPL-MCNC: 1.03 MG/DL (ref 0.67–1.17)
EGFRCR SERPLBLD CKD-EPI 2021: >90 ML/MIN/1.73M2
EOSINOPHIL # BLD AUTO: 0 10E3/UL (ref 0–0.7)
EOSINOPHIL NFR BLD AUTO: 0 %
ERYTHROCYTE [DISTWIDTH] IN BLOOD BY AUTOMATED COUNT: 12 % (ref 10–15)
GLUCOSE SERPL-MCNC: 149 MG/DL (ref 70–99)
HCO3 SERPL-SCNC: 26 MMOL/L (ref 22–29)
HCT VFR BLD AUTO: 42.6 % (ref 40–53)
HGB BLD-MCNC: 15.4 G/DL (ref 13.3–17.7)
IMM GRANULOCYTES # BLD: 0.1 10E3/UL
IMM GRANULOCYTES NFR BLD: 0 %
LIPASE SERPL-CCNC: 119 U/L (ref 13–60)
LYMPHOCYTES # BLD AUTO: 0.3 10E3/UL (ref 0.8–5.3)
LYMPHOCYTES NFR BLD AUTO: 2 %
MCH RBC QN AUTO: 29.7 PG (ref 26.5–33)
MCHC RBC AUTO-ENTMCNC: 36.2 G/DL (ref 31.5–36.5)
MCV RBC AUTO: 82 FL (ref 78–100)
MONOCYTES # BLD AUTO: 0.4 10E3/UL (ref 0–1.3)
MONOCYTES NFR BLD AUTO: 3 %
NEUTROPHILS # BLD AUTO: 15 10E3/UL (ref 1.6–8.3)
NEUTROPHILS NFR BLD AUTO: 95 %
NRBC # BLD AUTO: 0 10E3/UL
NRBC BLD AUTO-RTO: 0 /100
PLATELET # BLD AUTO: 155 10E3/UL (ref 150–450)
POTASSIUM SERPL-SCNC: 4.9 MMOL/L (ref 3.4–5.3)
PROT SERPL-MCNC: 7.6 G/DL (ref 6.4–8.3)
RBC # BLD AUTO: 5.18 10E6/UL (ref 4.4–5.9)
SODIUM SERPL-SCNC: 135 MMOL/L (ref 135–145)
WBC # BLD AUTO: 15.9 10E3/UL (ref 4–11)

## 2025-02-22 PROCEDURE — 96374 THER/PROPH/DIAG INJ IV PUSH: CPT | Mod: 59

## 2025-02-22 PROCEDURE — 250N000011 HC RX IP 250 OP 636: Performed by: EMERGENCY MEDICINE

## 2025-02-22 PROCEDURE — 85025 COMPLETE CBC W/AUTO DIFF WBC: CPT | Performed by: EMERGENCY MEDICINE

## 2025-02-22 PROCEDURE — 250N000009 HC RX 250: Performed by: EMERGENCY MEDICINE

## 2025-02-22 PROCEDURE — 74177 CT ABD & PELVIS W/CONTRAST: CPT

## 2025-02-22 PROCEDURE — 258N000003 HC RX IP 258 OP 636: Performed by: EMERGENCY MEDICINE

## 2025-02-22 PROCEDURE — 36415 COLL VENOUS BLD VENIPUNCTURE: CPT | Performed by: EMERGENCY MEDICINE

## 2025-02-22 PROCEDURE — 99285 EMERGENCY DEPT VISIT HI MDM: CPT | Mod: 25

## 2025-02-22 PROCEDURE — 96375 TX/PRO/DX INJ NEW DRUG ADDON: CPT

## 2025-02-22 PROCEDURE — 83690 ASSAY OF LIPASE: CPT | Performed by: EMERGENCY MEDICINE

## 2025-02-22 PROCEDURE — 80053 COMPREHEN METABOLIC PANEL: CPT | Performed by: EMERGENCY MEDICINE

## 2025-02-22 PROCEDURE — 96361 HYDRATE IV INFUSION ADD-ON: CPT

## 2025-02-22 RX ORDER — ONDANSETRON 2 MG/ML
4 INJECTION INTRAMUSCULAR; INTRAVENOUS ONCE
Status: COMPLETED | OUTPATIENT
Start: 2025-02-22 | End: 2025-02-22

## 2025-02-22 RX ORDER — IOPAMIDOL 755 MG/ML
93 INJECTION, SOLUTION INTRAVASCULAR ONCE
Status: COMPLETED | OUTPATIENT
Start: 2025-02-22 | End: 2025-02-22

## 2025-02-22 RX ORDER — LORAZEPAM 2 MG/ML
1 INJECTION INTRAMUSCULAR
Status: COMPLETED | OUTPATIENT
Start: 2025-02-22 | End: 2025-02-22

## 2025-02-22 RX ORDER — METOCLOPRAMIDE HYDROCHLORIDE 5 MG/ML
10 INJECTION INTRAMUSCULAR; INTRAVENOUS ONCE
Status: COMPLETED | OUTPATIENT
Start: 2025-02-22 | End: 2025-02-22

## 2025-02-22 RX ADMIN — SODIUM CHLORIDE 1000 ML: 9 INJECTION, SOLUTION INTRAVENOUS at 01:22

## 2025-02-22 RX ADMIN — ONDANSETRON 4 MG: 2 INJECTION, SOLUTION INTRAMUSCULAR; INTRAVENOUS at 01:22

## 2025-02-22 RX ADMIN — LORAZEPAM 1 MG: 2 INJECTION INTRAMUSCULAR; INTRAVENOUS at 01:55

## 2025-02-22 RX ADMIN — SODIUM CHLORIDE 67 ML: 9 INJECTION, SOLUTION INTRAVENOUS at 02:45

## 2025-02-22 RX ADMIN — METOCLOPRAMIDE 10 MG: 5 INJECTION, SOLUTION INTRAMUSCULAR; INTRAVENOUS at 01:56

## 2025-02-22 RX ADMIN — IOPAMIDOL 93 ML: 755 INJECTION, SOLUTION INTRAVENOUS at 02:45

## 2025-02-22 ASSESSMENT — COLUMBIA-SUICIDE SEVERITY RATING SCALE - C-SSRS
2. HAVE YOU ACTUALLY HAD ANY THOUGHTS OF KILLING YOURSELF IN THE PAST MONTH?: NO
6. HAVE YOU EVER DONE ANYTHING, STARTED TO DO ANYTHING, OR PREPARED TO DO ANYTHING TO END YOUR LIFE?: NO
1. IN THE PAST MONTH, HAVE YOU WISHED YOU WERE DEAD OR WISHED YOU COULD GO TO SLEEP AND NOT WAKE UP?: NO

## 2025-02-22 ASSESSMENT — ACTIVITIES OF DAILY LIVING (ADL)
ADLS_ACUITY_SCORE: 46

## 2025-02-22 NOTE — DISCHARGE INSTRUCTIONS
The nausea and vomiting may be from gastroparesis, but also may be from a stomach bug.  Ravi may develop some diarrhea.  Please continue with the Reglan as needed to help with the nausea and continue to encourage fluids.

## 2025-02-22 NOTE — ED TRIAGE NOTES
Started vomiting around 2000 today, took reglan with no relief. Hx gastroparesis. BIB mom who is legal guardian.     Triage Assessment (Adult)       Row Name 02/22/25 0103          Triage Assessment    Airway WDL WDL        Respiratory WDL    Respiratory WDL WDL        Skin Circulation/Temperature WDL    Skin Circulation/Temperature WDL WDL        Cardiac WDL    Cardiac WDL WDL        Peripheral/Neurovascular WDL    Peripheral Neurovascular WDL WDL        Cognitive/Neuro/Behavioral WDL    Cognitive/Neuro/Behavioral WDL X;mood/behavior     Level of Consciousness alert     Mood/Behavior anxious;cooperative  minimal verbal response 2  autism        Randa Coma Scale    Best Eye Response 4-->(E4) spontaneous     Best Motor Response 6-->(M6) obeys commands     Best Verbal Response 5-->(V5) oriented     [place] Coma Scale Score 15

## 2025-02-22 NOTE — ED PROVIDER NOTES
Emergency Department Note      History of Present Illness     Chief Complaint  Nausea & Vomiting (Started vomiting around 2000 today, took reglan with no relief. Hx gastroparesis. BIB mom who is legal guardian.)    HPI  Ravi Dunne is a 34 year old male with history of gastroparesis, cyclic vomiting syndrome, and autism who presents to the ED with his mother for evaluation of nausea and vomiting. His mother reports patient began vomiting after dinner around 2000. He felt fine at 1900 but was complaining about the left side of his head. His mom thinks it's due to the earwax he was told he had in his left ear four days ago by his primary doctor. The vomiting was similar to his gastroparesis. Last episode of emesis was before they left to go to ED. She tried to give him Reglan but he vomited it up. Denies diarrhea.     Independent Historian  Mother as detailed above.    Review of External Notes  I reviewed the GI note from 11/21/24 for gastroparesis   Past Medical History   Medical History and Problem List   Chronic motor or vocal tic disorder  External hemorrhoids  GERD  GI hemorrhage  Anxiety  Autism  Cyclic vomiting syndrome  Mood disorder  Hyperlipidemia  Gastroparesis    Medications   Lexapro  Levothyroxine  Ativan  Reglan  Prilosec  Trileptal  Seroquel  Senna-docusate   Physical Exam   Patient Vitals for the past 24 hrs:   BP Pulse SpO2   02/22/25 0406 -- -- 99 %   02/22/25 0405 130/73 91 --     Physical Exam  Head: No signs of trauma.   CV: Normal rate and regular rhythm.    Resp: Effort normal and breath sounds normal. No respiratory distress.   GI: Soft. There is diffuse tenderness.  No rebound or guarding.  Normal bowel sounds.  No CVA tenderness.  MSK: Normal range of motion. no edema. No Calf tenderness.  Neuro: The patient is alert and oriented. Speech normal.  Skin: Skin is warm and dry. No rash noted.   Psych: Calm and cooperative      Diagnostics   Lab Results   Labs Ordered and Resulted from Time  of ED Arrival to Time of ED Departure   COMPREHENSIVE METABOLIC PANEL - Abnormal       Result Value    Sodium 135      Potassium 4.9      Carbon Dioxide (CO2) 26      Anion Gap 12      Urea Nitrogen 16.3      Creatinine 1.03      GFR Estimate >90      Calcium 9.6      Chloride 97 (*)     Glucose 149 (*)     Alkaline Phosphatase 93      AST 23      ALT 42      Protein Total 7.6      Albumin 4.7      Bilirubin Total 0.7     LIPASE - Abnormal    Lipase 119 (*)    CBC WITH PLATELETS AND DIFFERENTIAL - Abnormal    WBC Count 15.9 (*)     RBC Count 5.18      Hemoglobin 15.4      Hematocrit 42.6      MCV 82      MCH 29.7      MCHC 36.2      RDW 12.0      Platelet Count 155      % Neutrophils 95      % Lymphocytes 2      % Monocytes 3      % Eosinophils 0      % Basophils 0      % Immature Granulocytes 0      NRBCs per 100 WBC 0      Absolute Neutrophils 15.0 (*)     Absolute Lymphocytes 0.3 (*)     Absolute Monocytes 0.4      Absolute Eosinophils 0.0      Absolute Basophils 0.0      Absolute Immature Granulocytes 0.1      Absolute NRBCs 0.0       Imaging  CT Abdomen Pelvis w Contrast   Final Result   IMPRESSION:    1.  Minimal stranding about the pancreatic head and neck, possible mild acute uncomplicated interstitial pancreatitis.   2.  Intraluminal fluid present within the cecum and ascending colon, which may be seen with nonspecific diarrheal illness.   3.  Normal appendix.   4.  Small sliding-type hiatal hernia.   5.  Mildly thickened urinary bladder likely on the basis of chronic outlet obstruction. May also consider correlation with urinalysis.        Report per radiology      ED Course    Medications Administered  Medications   ondansetron (ZOFRAN) injection 4 mg (4 mg Intravenous $Given 2/22/25 0122)   sodium chloride 0.9% BOLUS 1,000 mL (0 mLs Intravenous Stopped 2/22/25 0342)   metoclopramide (REGLAN) injection 10 mg (10 mg Intravenous $Given 2/22/25 0156)   LORazepam (ATIVAN) injection 1 mg (1 mg Intravenous  $Given 2/22/25 0155)   iopamidol (ISOVUE-370) solution 93 mL (93 mLs Intravenous $Given 2/22/25 0245)   sodium chloride 0.9 % bag 500mL for CT scan flush use (67 mLs Intravenous $Given 2/22/25 0245)     Procedures  Procedures     Discussion of Management  None    Additional Documentation  None    ED Course  ED Course as of 02/23/25 0105   Sat Feb 22, 2025   0137 I obtained history and examined the patient as noted above.    0346 I rechecked the patient and explained findings.    0358 I prepared the patient to be discharged home.      Medical Decision Making / Diagnosis   CMS Diagnoses: None    MIPS     None    [facility]  Ravi Dunne is a 34 year old male presents with nausea and vomiting.  He has a history of gastroparesis causing repeated vomiting.  Mother tried giving him his Reglan but when the symptoms persisted she brought him to the emergency department.  Patient does have autism which somewhat limits the history and exam from the patient.  He did indicate having some abdominal tenderness.  Blood work was obtained that did show an elevated white blood cell count.  He also had a mild to moderate elevation of his lipase.  CT scan was obtained that showed findings consistent with a gastroenteritis, which would fit with his presentation as there is been significant cases of norovirus recently.  It did mention some inflammation around the pancreas.  On repeat evaluation the patient was feeling better and was tolerating p.o. and was not having pain.  Clinically I have a low suspicion for concerning pancreatitis.  Given the improvement of his symptoms, I felt he is appropriate for discharge home with mother and recommended continue with the Reglan as this would cover both a viral gastroenteritis along with gastroparesis.  I recommend follow-up in clinic, and given return precautions.    Disposition  The patient was discharged.     ICD-10 Codes:    ICD-10-CM    1. Nausea and vomiting, unspecified vomiting type  R11.2        2. Gastroenteritis  K52.9          Discharge Medications  Discharge Medication List as of 2/22/2025  4:08 AM        Scribe Disclosure:  I, Maria Esther Baker, am serving as a scribe at 1:47 AM on 2/22/2025 to document services personally performed by Matthew San MD based on my observations and the provider's statements to me.      Matthew San MD  02/23/25 0108

## 2025-03-03 ENCOUNTER — OFFICE VISIT (OUTPATIENT)
Dept: INTERNAL MEDICINE | Facility: CLINIC | Age: 35
End: 2025-03-03
Payer: MEDICAID

## 2025-03-03 VITALS
RESPIRATION RATE: 16 BRPM | DIASTOLIC BLOOD PRESSURE: 80 MMHG | HEIGHT: 78 IN | BODY MASS INDEX: 21.47 KG/M2 | SYSTOLIC BLOOD PRESSURE: 120 MMHG | WEIGHT: 185.6 LBS | OXYGEN SATURATION: 99 % | TEMPERATURE: 98.1 F | HEART RATE: 70 BPM

## 2025-03-03 DIAGNOSIS — E06.3 AUTOIMMUNE HYPOTHYROIDISM: ICD-10-CM

## 2025-03-03 DIAGNOSIS — R11.15 CYCLIC VOMITING SYNDROME: ICD-10-CM

## 2025-03-03 DIAGNOSIS — H61.22 IMPACTED CERUMEN OF LEFT EAR: Primary | ICD-10-CM

## 2025-03-03 DIAGNOSIS — D17.0 LIPOMA OF SCALP: ICD-10-CM

## 2025-03-03 PROCEDURE — 3074F SYST BP LT 130 MM HG: CPT | Performed by: INTERNAL MEDICINE

## 2025-03-03 PROCEDURE — 3079F DIAST BP 80-89 MM HG: CPT | Performed by: INTERNAL MEDICINE

## 2025-03-03 PROCEDURE — G2211 COMPLEX E/M VISIT ADD ON: HCPCS | Performed by: INTERNAL MEDICINE

## 2025-03-03 PROCEDURE — 99213 OFFICE O/P EST LOW 20 MIN: CPT | Performed by: INTERNAL MEDICINE

## 2025-03-03 NOTE — PATIENT INSTRUCTIONS
Referral to ENT clinic for ear wax removal.  They can accomplish this easily with a small suction device.        [facility]    [address]   [place]   [facility] [address]   Phone: [phone]   Fax: [phone]             Continue all medications at the same doses.  Contact your usual pharmacy if you need refills.      Follow up with the Gastroenterology Clinic as planned.       Referral to Dermatology Clinic to discuss removal of the scalp lipoma.

## 2025-03-03 NOTE — PROGRESS NOTES
"  Assessment & Plan     (H61.22) Impacted cerumen of left ear  (primary encounter diagnosis)  Comment: Referral to ENT for removal of the cerumen in the left ear.  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  Continue current therapy.   Plan:     (R11.15) Cyclic vomiting syndrome  Comment: Continue working with gastroenterology   plan:     (D17.0) Lipoma of scalp  Comment: Large lipoma the vertex of his scalp.  Referral to dermatology for removal as it is interfering with brushing and combing his hair  Plan: Adult Dermatology  Referral                           Emma Rodrigues is a 34 year old, presenting for the following health issues:  Ear Problem    HPI      Look at both ears plugged with wax at last visit was told to use debrox not sure how successful it was per mom.     Another visit to the emergency room for her cyclic vomiting.  Sent home with after IV fluids.  They have previously scheduled follow-up with gastroenterology clinic this week.    **I reviewed the information recorded in the patient's EPIC chart (including but not limited to medical history, surgical history, family history, problem list, medication list, and allergy list) and updated the information as indicated based on the patients reported information.         Review of Systems  Constitutional, HEENT, cardiovascular, pulmonary, gi and gu systems are negative, except as otherwise noted.      Objective    /80 (BP Location: Left arm, Patient Position: Sitting, Cuff Size: Adult Regular)   Pulse 70   Temp 98.1  F (36.7  C) (Temporal)   Resp 16   Ht 2.057 m (6' 9\")   Wt 84.2 kg (185 lb 9.6 oz)   SpO2 99%   BMI 19.89 kg/m    Body mass index is 19.89 kg/m .  Physical Exam   GENERAL alert and no distress  EYES:  Normal sclera,conjunctiva, EOMI  EAR right canal and tympanic membrane very normal.:    Left canal " completely occluded with dark black wax.  I was able to remove some of the wax with a loop so I could just see the very top of the tympanic membrane.  Attempted water irrigation, but the patient did not tolerate the procedure, therefore this was abandoned.  HENT: facies symmetric  MS: extremities- no gross deformities of the visible extremities noted,   PSYCH: Patient has chronic developmental delay with autism.  SKIN:  No obvious significant skin lesions on visible portions of face   Large lipoma on the top of the patient's head, freely mobile in the subcutaneous tissue  NEURO:  Normal facial movements.  Appears to move normally           The longitudinal plan of care for the diagnosis(es)/condition(s) as documented were addressed during this visit. Due to the added complexity in care, I will continue to support Ravi in the subsequent management and with ongoing continuity of care.    Signed Electronically by: Bhaskar Gandara MD

## 2025-03-04 ENCOUNTER — TRANSFERRED RECORDS (OUTPATIENT)
Dept: HEALTH INFORMATION MANAGEMENT | Facility: CLINIC | Age: 35
End: 2025-03-04
Payer: MEDICAID

## 2025-03-04 LAB
ALT SERPL-CCNC: 53 IU/L (ref 0–44)
AST SERPL-CCNC: 30 IU/L (ref 0–40)
CREATININE (EXTERNAL): 0.75 MG/DL (ref 0.76–1.27)
GFR ESTIMATED (EXTERNAL): 120 ML/MIN/1.73
GLUCOSE (EXTERNAL): 93 MG/DL (ref 70–99)
HBA1C MFR BLD: 5.4 % (ref 4.8–5.6)
POTASSIUM (EXTERNAL): 4.8 MMOL/L (ref 3.5–5.2)
TRIGLYCERIDES (EXTERNAL): 134 MG/DL (ref 0–149)

## 2025-03-25 ENCOUNTER — TRANSFERRED RECORDS (OUTPATIENT)
Dept: HEALTH INFORMATION MANAGEMENT | Facility: CLINIC | Age: 35
End: 2025-03-25
Payer: MEDICAID

## 2025-03-27 ENCOUNTER — HOSPITAL ENCOUNTER (OUTPATIENT)
Dept: ULTRASOUND IMAGING | Facility: CLINIC | Age: 35
Discharge: HOME | End: 2025-03-27
Attending: INTERNAL MEDICINE
Payer: MEDICAID

## 2025-03-27 DIAGNOSIS — K31.84 GASTROPARESIS: ICD-10-CM

## 2025-03-27 DIAGNOSIS — K85.90 ACUTE PANCREATITIS, UNSPECIFIED COMPLICATION STATUS, UNSPECIFIED PANCREATITIS TYPE: ICD-10-CM

## 2025-03-27 DIAGNOSIS — K59.00 CONSTIPATION, UNSPECIFIED CONSTIPATION TYPE: ICD-10-CM

## 2025-03-27 DIAGNOSIS — K21.9 GASTROESOPHAGEAL REFLUX DISEASE WITHOUT ESOPHAGITIS: ICD-10-CM

## 2025-03-27 PROCEDURE — 76700 US EXAM ABDOM COMPLETE: CPT
